# Patient Record
Sex: MALE | Race: WHITE | NOT HISPANIC OR LATINO
[De-identification: names, ages, dates, MRNs, and addresses within clinical notes are randomized per-mention and may not be internally consistent; named-entity substitution may affect disease eponyms.]

---

## 2017-01-06 ENCOUNTER — APPOINTMENT (OUTPATIENT)
Dept: GASTROENTEROLOGY | Facility: CLINIC | Age: 52
End: 2017-01-06

## 2017-01-20 ENCOUNTER — APPOINTMENT (OUTPATIENT)
Dept: GASTROENTEROLOGY | Facility: CLINIC | Age: 52
End: 2017-01-20

## 2017-01-30 ENCOUNTER — APPOINTMENT (OUTPATIENT)
Dept: INTERNAL MEDICINE | Facility: CLINIC | Age: 52
End: 2017-01-30

## 2017-02-01 ENCOUNTER — OTHER (OUTPATIENT)
Age: 52
End: 2017-02-01

## 2017-02-01 ENCOUNTER — APPOINTMENT (OUTPATIENT)
Dept: INTERNAL MEDICINE | Facility: CLINIC | Age: 52
End: 2017-02-01

## 2017-02-01 VITALS
WEIGHT: 213 LBS | TEMPERATURE: 97.9 F | DIASTOLIC BLOOD PRESSURE: 87 MMHG | HEART RATE: 83 BPM | BODY MASS INDEX: 31.55 KG/M2 | SYSTOLIC BLOOD PRESSURE: 132 MMHG | HEIGHT: 69 IN | RESPIRATION RATE: 17 BRPM

## 2017-02-01 DIAGNOSIS — F17.200 NICOTINE DEPENDENCE, UNSPECIFIED, UNCOMPLICATED: ICD-10-CM

## 2017-02-16 ENCOUNTER — APPOINTMENT (OUTPATIENT)
Dept: PODIATRY | Facility: CLINIC | Age: 52
End: 2017-02-16

## 2017-02-22 ENCOUNTER — APPOINTMENT (OUTPATIENT)
Dept: HEMATOLOGY ONCOLOGY | Facility: CLINIC | Age: 52
End: 2017-02-22

## 2017-02-22 ENCOUNTER — OUTPATIENT (OUTPATIENT)
Dept: OUTPATIENT SERVICES | Facility: HOSPITAL | Age: 52
LOS: 1 days | Discharge: HOME | End: 2017-02-22

## 2017-02-22 VITALS
TEMPERATURE: 97.6 F | WEIGHT: 216 LBS | DIASTOLIC BLOOD PRESSURE: 85 MMHG | SYSTOLIC BLOOD PRESSURE: 137 MMHG | HEIGHT: 69 IN | HEART RATE: 90 BPM | BODY MASS INDEX: 31.99 KG/M2

## 2017-02-24 LAB
AFP-TM SERPL-MCNC: 2.9 NG/ML
ALBUMIN SERPL-MCNC: 3.6 G/DL
ALBUMIN/GLOB SERPL: 0.97
ALP SERPL-CCNC: 105 IU/L
ALT SERPL-CCNC: 35 IU/L
ANION GAP SERPL CALC-SCNC: 12 MEQ/L
AST SERPL-CCNC: 44 IU/L
BASOPHILS # BLD: 0.03 TH/MM3
BASOPHILS NFR BLD: 0.6 %
BILIRUB SERPL-MCNC: 1.6 MG/DL
BUN SERPL-MCNC: 9 MG/DL
BUN/CREAT SERPL: 19.6 %
CALCIUM SERPL-MCNC: 9.2 MG/DL
CHLORIDE SERPL-SCNC: 105 MEQ/L
CO2 SERPL-SCNC: 22 MEQ/L
CREAT SERPL-MCNC: 0.46 MG/DL
EOSINOPHIL # BLD: 0.19 TH/MM3
EOSINOPHIL NFR BLD: 3.6 %
ERYTHROCYTE [DISTWIDTH] IN BLOOD BY AUTOMATED COUNT: 14.4 %
GFR SERPL CREATININE-BSD FRML MDRD: 194
GLUCOSE SERPL-MCNC: 99 MG/DL
GRANULOCYTES # BLD: 3.18 TH/MM3
GRANULOCYTES NFR BLD: 59.3 %
HCT VFR BLD AUTO: 40.4 %
HGB BLD-MCNC: 14.4 G/DL
IMM GRANULOCYTES # BLD: 0 TH/MM3
IMM GRANULOCYTES NFR BLD: 0 %
LYMPHOCYTES # BLD: 1.38 TH/MM3
LYMPHOCYTES NFR BLD: 25.8 %
MCH RBC QN AUTO: 32 PG
MCHC RBC AUTO-ENTMCNC: 35.6 G/DL
MCV RBC AUTO: 89.8 FL
MONOCYTES # BLD: 0.57 TH/MM3
MONOCYTES NFR BLD: 10.7 %
PLATELET # BLD: 80 TH/MM3
PMV BLD AUTO: 10.2 FL
POTASSIUM SERPL-SCNC: 3.8 MMOL/L
PROT SERPL-MCNC: 7.3 G/DL
RBC # BLD AUTO: 4.5 MIL/MM3
SODIUM SERPL-SCNC: 139 MEQ/L
WBC # BLD: 5.35 TH/MM3

## 2017-04-26 ENCOUNTER — APPOINTMENT (OUTPATIENT)
Dept: HEMATOLOGY ONCOLOGY | Facility: CLINIC | Age: 52
End: 2017-04-26

## 2017-05-05 ENCOUNTER — APPOINTMENT (OUTPATIENT)
Dept: PODIATRY | Facility: CLINIC | Age: 52
End: 2017-05-05

## 2017-06-27 DIAGNOSIS — D69.6 THROMBOCYTOPENIA, UNSPECIFIED: ICD-10-CM

## 2017-06-27 DIAGNOSIS — B18.2 CHRONIC VIRAL HEPATITIS C: ICD-10-CM

## 2017-06-27 DIAGNOSIS — C22.0 LIVER CELL CARCINOMA: ICD-10-CM

## 2017-08-02 ENCOUNTER — APPOINTMENT (OUTPATIENT)
Dept: INTERNAL MEDICINE | Facility: CLINIC | Age: 52
End: 2017-08-02

## 2017-08-13 ENCOUNTER — EMERGENCY (EMERGENCY)
Facility: HOSPITAL | Age: 52
LOS: 0 days | Discharge: HOME | End: 2017-08-13
Admitting: INTERNAL MEDICINE

## 2017-08-13 DIAGNOSIS — Z79.82 LONG TERM (CURRENT) USE OF ASPIRIN: ICD-10-CM

## 2017-08-13 DIAGNOSIS — E11.9 TYPE 2 DIABETES MELLITUS WITHOUT COMPLICATIONS: ICD-10-CM

## 2017-08-13 DIAGNOSIS — Z79.899 OTHER LONG TERM (CURRENT) DRUG THERAPY: ICD-10-CM
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
## 2017-08-13 DIAGNOSIS — S39.012A STRAIN OF MUSCLE, FASCIA AND TENDON OF LOWER BACK, INITIAL ENCOUNTER: ICD-10-CM

## 2017-08-13 DIAGNOSIS — Z79.84 LONG TERM (CURRENT) USE OF ORAL HYPOGLYCEMIC DRUGS: ICD-10-CM
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
## 2017-08-13 DIAGNOSIS — Z87.891 PERSONAL HISTORY OF NICOTINE DEPENDENCE: ICD-10-CM

## 2017-08-13 DIAGNOSIS — M54.5 LOW BACK PAIN: ICD-10-CM

## 2017-08-18 ENCOUNTER — APPOINTMENT (OUTPATIENT)
Dept: INTERNAL MEDICINE | Facility: CLINIC | Age: 52
End: 2017-08-18

## 2017-08-18 ENCOUNTER — OUTPATIENT (OUTPATIENT)
Dept: OUTPATIENT SERVICES | Facility: HOSPITAL | Age: 52
LOS: 1 days | Discharge: HOME | End: 2017-08-18

## 2017-08-18 VITALS
WEIGHT: 226 LBS | RESPIRATION RATE: 17 BRPM | BODY MASS INDEX: 33.47 KG/M2 | DIASTOLIC BLOOD PRESSURE: 83 MMHG | HEIGHT: 69 IN | HEART RATE: 84 BPM | SYSTOLIC BLOOD PRESSURE: 132 MMHG

## 2017-08-18 RX ORDER — TEA TREE OIL 100 %
OIL (ML) TOPICAL
Qty: 1 | Refills: 10 | Status: DISCONTINUED | COMMUNITY
Start: 2017-02-16 | End: 2017-08-18

## 2017-08-18 RX ORDER — DOCUSATE SODIUM 100 MG/1
100 CAPSULE ORAL
Qty: 60 | Refills: 5 | Status: DISCONTINUED | COMMUNITY
Start: 2017-02-01 | End: 2017-08-18

## 2017-08-18 RX ORDER — NYSTATIN 100000 [USP'U]/G
100000 CREAM TOPICAL
Qty: 3 | Refills: 10 | Status: DISCONTINUED | COMMUNITY
Start: 2017-02-16 | End: 2017-08-18

## 2017-08-18 RX ORDER — ALBUTEROL SULFATE 90 UG/1
108 (90 BASE) AEROSOL, METERED RESPIRATORY (INHALATION) 3 TIMES DAILY
Qty: 1 | Refills: 3 | Status: DISCONTINUED | COMMUNITY
Start: 2017-02-01 | End: 2017-08-18

## 2017-08-25 DIAGNOSIS — M54.9 DORSALGIA, UNSPECIFIED: ICD-10-CM

## 2017-08-25 DIAGNOSIS — E11.9 TYPE 2 DIABETES MELLITUS WITHOUT COMPLICATIONS: ICD-10-CM

## 2017-08-25 LAB
ESTIMATED AVERGAGE GLUCOSE (NORTH): 126 MG/DL
HBA1C MFR BLD: 6 %

## 2017-09-15 ENCOUNTER — APPOINTMENT (OUTPATIENT)
Dept: INTERNAL MEDICINE | Facility: CLINIC | Age: 52
End: 2017-09-15

## 2017-09-15 ENCOUNTER — OUTPATIENT (OUTPATIENT)
Dept: OUTPATIENT SERVICES | Facility: HOSPITAL | Age: 52
LOS: 1 days | Discharge: HOME | End: 2017-09-15

## 2017-09-15 VITALS
WEIGHT: 228 LBS | HEART RATE: 83 BPM | HEIGHT: 69 IN | DIASTOLIC BLOOD PRESSURE: 82 MMHG | SYSTOLIC BLOOD PRESSURE: 126 MMHG | BODY MASS INDEX: 33.77 KG/M2

## 2017-09-15 DIAGNOSIS — E11.9 TYPE 2 DIABETES MELLITUS WITHOUT COMPLICATIONS: ICD-10-CM

## 2017-09-15 DIAGNOSIS — Z23 ENCOUNTER FOR IMMUNIZATION: ICD-10-CM

## 2017-09-15 DIAGNOSIS — C22.0 LIVER CELL CARCINOMA: ICD-10-CM

## 2017-12-20 ENCOUNTER — OTHER (OUTPATIENT)
Age: 52
End: 2017-12-20

## 2018-02-22 ENCOUNTER — OUTPATIENT (OUTPATIENT)
Dept: OUTPATIENT SERVICES | Facility: HOSPITAL | Age: 53
LOS: 1 days | Discharge: HOME | End: 2018-02-22

## 2018-02-22 DIAGNOSIS — H52.4 PRESBYOPIA: ICD-10-CM

## 2018-02-22 DIAGNOSIS — H52.13 MYOPIA, BILATERAL: ICD-10-CM

## 2018-03-16 ENCOUNTER — APPOINTMENT (OUTPATIENT)
Dept: INTERNAL MEDICINE | Facility: CLINIC | Age: 53
End: 2018-03-16

## 2018-06-11 ENCOUNTER — EMERGENCY (EMERGENCY)
Facility: HOSPITAL | Age: 53
LOS: 0 days | Discharge: HOME | End: 2018-06-11
Admitting: PHYSICIAN ASSISTANT

## 2018-06-11 VITALS
SYSTOLIC BLOOD PRESSURE: 135 MMHG | OXYGEN SATURATION: 96 % | DIASTOLIC BLOOD PRESSURE: 93 MMHG | HEART RATE: 100 BPM | TEMPERATURE: 98 F | RESPIRATION RATE: 18 BRPM

## 2018-06-11 VITALS — HEART RATE: 83 BPM | SYSTOLIC BLOOD PRESSURE: 156 MMHG | DIASTOLIC BLOOD PRESSURE: 77 MMHG

## 2018-06-11 DIAGNOSIS — M25.511 PAIN IN RIGHT SHOULDER: ICD-10-CM

## 2018-06-11 NOTE — ED PROVIDER NOTE - OBJECTIVE STATEMENT
52 y.o male with hx of being deaf presents to the ED for evaluation of right shoulder pain x 3 months.  Pt was reaching back for a cup when he felt a sharp pain in his right shoulder.  Since then he has been experiencing shoulder pain with ROM and alleviated with NSAIDs and rest.  No trauma or fall to shoulder.  No prior injury to shoulder.  Right hand dominant.  Adds no other complaints at this time.  Denies edema of extremity, cold extremity, chest pain, dyspnea, edema of lower extremities, calf pain.

## 2018-06-11 NOTE — ED PROVIDER NOTE - NS ED ROS FT
CONST: No fever, chills or bodyaches  EYES: No pain, redness, drainage or visual changes.  ENT: No ear pain or discharge, nasal discharge or congestion. No sore throat  CARD: No chest pain, palpitations  RESP: No SOB, cough,  GI: No abdominal pain, N/V/D  MS: + right shoulder pain. No back pain   SKIN: No rashes  NEURO: No headache, dizziness, paresthesias

## 2018-06-11 NOTE — ED ADULT NURSE NOTE - CHIEF COMPLAINT QUOTE
pt c/o bilat shoulder pain x 3 months s/p doing heavy lifting at home. sign language audio  used, access # [id number]

## 2018-06-11 NOTE — ED PROVIDER NOTE - PHYSICAL EXAMINATION
CONST: Well appearing in NAD  EYES: Sclera and conjunctiva clear.  NECK: Non-tender, no midline C/T/L midline tenderness, no C/T/L paraspinal tenderness, no stepoff, crepitus or deformity  CARD: Normal S1 S2; Normal rate and rhythm  RESP: Equal BS B/L, No wheezes, rhonchi or rales. No distress  MS: pain with ROM of right shoulder, no point tenderness, no erythema overlying area, no evidence of trauma, no clavicular tenderness, no TTP of arm, radial pulses 2+ bilaterally  SKIN: Warm, dry, no acute rashes. Good turgor  NEURO: Strength 5/5 with no sensory deficits, NVT intact

## 2018-06-11 NOTE — ED ADULT NURSE NOTE - OBJECTIVE STATEMENT
Patient complains of shoulder pain bilateral, states he was doing heavy lifting in March, has been taking ibuprofen at home with little relief.

## 2018-06-11 NOTE — ED ADULT TRIAGE NOTE - CHIEF COMPLAINT QUOTE
pt c/o bilat shoulder pain x 3 months s/p doing heavy lifting at home. sign language audio  used, access # [id number]

## 2018-06-11 NOTE — ED ADULT TRIAGE NOTE - CADM TRG TX PRIOR TO ARRIVAL
Ok for surgery with normal CMP & CBC. FYI fibrotic changes in lungs on CXR (patient asymptomatic however).   -Yi Wilkins MD  ice/medications

## 2018-06-11 NOTE — ED PROVIDER NOTE - PROGRESS NOTE DETAILS
paged at [phone] I supervised PA fellow care  [id number] used.  Discussed with pt that sxs are consistent with rotator cuff injury.  Will take NSAIDs and discussed R.I.C.E therapy.  Will follow up with orthopedics.  All questions were answered and return precautions discussed.  pt understands and agrees with tx plan.

## 2019-11-25 ENCOUNTER — INPATIENT (INPATIENT)
Facility: HOSPITAL | Age: 54
LOS: 2 days | Discharge: HOME | End: 2019-11-28
Attending: STUDENT IN AN ORGANIZED HEALTH CARE EDUCATION/TRAINING PROGRAM | Admitting: STUDENT IN AN ORGANIZED HEALTH CARE EDUCATION/TRAINING PROGRAM
Payer: MEDICAID

## 2019-11-25 VITALS
OXYGEN SATURATION: 99 % | DIASTOLIC BLOOD PRESSURE: 75 MMHG | RESPIRATION RATE: 18 BRPM | WEIGHT: 212.97 LBS | SYSTOLIC BLOOD PRESSURE: 139 MMHG | TEMPERATURE: 101 F | HEART RATE: 105 BPM

## 2019-11-25 LAB
ALBUMIN SERPL ELPH-MCNC: 3.2 G/DL — LOW (ref 3.5–5.2)
ALP SERPL-CCNC: 137 U/L — HIGH (ref 30–115)
ALT FLD-CCNC: 45 U/L — HIGH (ref 0–41)
ANION GAP SERPL CALC-SCNC: 14 MMOL/L — SIGNIFICANT CHANGE UP (ref 7–14)
APPEARANCE UR: CLEAR — SIGNIFICANT CHANGE UP
APTT BLD: 38 SEC — SIGNIFICANT CHANGE UP (ref 27–39.2)
AST SERPL-CCNC: 51 U/L — HIGH (ref 0–41)
BACTERIA # UR AUTO: NEGATIVE — SIGNIFICANT CHANGE UP
BASOPHILS # BLD AUTO: 0.03 K/UL — SIGNIFICANT CHANGE UP (ref 0–0.2)
BASOPHILS NFR BLD AUTO: 0.5 % — SIGNIFICANT CHANGE UP (ref 0–1)
BILIRUB SERPL-MCNC: 1.7 MG/DL — HIGH (ref 0.2–1.2)
BILIRUB UR-MCNC: ABNORMAL
BUN SERPL-MCNC: 26 MG/DL — HIGH (ref 10–20)
CALCIUM SERPL-MCNC: 8.5 MG/DL — SIGNIFICANT CHANGE UP (ref 8.5–10.1)
CHLORIDE SERPL-SCNC: 94 MMOL/L — LOW (ref 98–110)
CO2 SERPL-SCNC: 21 MMOL/L — SIGNIFICANT CHANGE UP (ref 17–32)
COLOR SPEC: ABNORMAL
CREAT SERPL-MCNC: 0.8 MG/DL — SIGNIFICANT CHANGE UP (ref 0.7–1.5)
DIFF PNL FLD: ABNORMAL
EOSINOPHIL # BLD AUTO: 0 K/UL — SIGNIFICANT CHANGE UP (ref 0–0.7)
EOSINOPHIL NFR BLD AUTO: 0 % — SIGNIFICANT CHANGE UP (ref 0–8)
EPI CELLS # UR: 1 /HPF — SIGNIFICANT CHANGE UP (ref 0–5)
FLU A RESULT: NEGATIVE — SIGNIFICANT CHANGE UP
FLU A RESULT: NEGATIVE — SIGNIFICANT CHANGE UP
FLUAV AG NPH QL: NEGATIVE — SIGNIFICANT CHANGE UP
FLUBV AG NPH QL: NEGATIVE — SIGNIFICANT CHANGE UP
GLUCOSE BLDC GLUCOMTR-MCNC: 347 MG/DL — HIGH (ref 70–99)
GLUCOSE SERPL-MCNC: 235 MG/DL — HIGH (ref 70–99)
GLUCOSE UR QL: SIGNIFICANT CHANGE UP
HCT VFR BLD CALC: 42.9 % — SIGNIFICANT CHANGE UP (ref 42–52)
HGB BLD-MCNC: 15.6 G/DL — SIGNIFICANT CHANGE UP (ref 14–18)
HYALINE CASTS # UR AUTO: 1 /LPF — SIGNIFICANT CHANGE UP (ref 0–7)
IMM GRANULOCYTES NFR BLD AUTO: 0.3 % — SIGNIFICANT CHANGE UP (ref 0.1–0.3)
INR BLD: 1.4 RATIO — HIGH (ref 0.65–1.3)
KETONES UR-MCNC: ABNORMAL
LACTATE SERPL-SCNC: 1.8 MMOL/L — SIGNIFICANT CHANGE UP (ref 0.5–2.2)
LEUKOCYTE ESTERASE UR-ACNC: NEGATIVE — SIGNIFICANT CHANGE UP
LYMPHOCYTES # BLD AUTO: 1.83 K/UL — SIGNIFICANT CHANGE UP (ref 1.2–3.4)
LYMPHOCYTES # BLD AUTO: 31.6 % — SIGNIFICANT CHANGE UP (ref 20.5–51.1)
MCHC RBC-ENTMCNC: 32.8 PG — HIGH (ref 27–31)
MCHC RBC-ENTMCNC: 36.4 G/DL — SIGNIFICANT CHANGE UP (ref 32–37)
MCV RBC AUTO: 90.3 FL — SIGNIFICANT CHANGE UP (ref 80–94)
MONOCYTES # BLD AUTO: 0.59 K/UL — SIGNIFICANT CHANGE UP (ref 0.1–0.6)
MONOCYTES NFR BLD AUTO: 10.2 % — HIGH (ref 1.7–9.3)
NEUTROPHILS # BLD AUTO: 3.33 K/UL — SIGNIFICANT CHANGE UP (ref 1.4–6.5)
NEUTROPHILS NFR BLD AUTO: 57.4 % — SIGNIFICANT CHANGE UP (ref 42.2–75.2)
NITRITE UR-MCNC: NEGATIVE — SIGNIFICANT CHANGE UP
NRBC # BLD: 0 /100 WBCS — SIGNIFICANT CHANGE UP (ref 0–0)
PH UR: 6.5 — SIGNIFICANT CHANGE UP (ref 5–8)
PLATELET # BLD AUTO: 60 K/UL — LOW (ref 130–400)
POTASSIUM SERPL-MCNC: 3.8 MMOL/L — SIGNIFICANT CHANGE UP (ref 3.5–5)
POTASSIUM SERPL-SCNC: 3.8 MMOL/L — SIGNIFICANT CHANGE UP (ref 3.5–5)
PROT SERPL-MCNC: 7.3 G/DL — SIGNIFICANT CHANGE UP (ref 6–8)
PROT UR-MCNC: ABNORMAL
PROTHROM AB SERPL-ACNC: 16.1 SEC — HIGH (ref 9.95–12.87)
RBC # BLD: 4.75 M/UL — SIGNIFICANT CHANGE UP (ref 4.7–6.1)
RBC # FLD: 13.2 % — SIGNIFICANT CHANGE UP (ref 11.5–14.5)
RBC CASTS # UR COMP ASSIST: 14 /HPF — HIGH (ref 0–4)
RSV RESULT: NEGATIVE — SIGNIFICANT CHANGE UP
RSV RNA RESP QL NAA+PROBE: NEGATIVE — SIGNIFICANT CHANGE UP
SODIUM SERPL-SCNC: 129 MMOL/L — LOW (ref 135–146)
SP GR SPEC: 1.04 — HIGH (ref 1.01–1.02)
UROBILINOGEN FLD QL: ABNORMAL
WBC # BLD: 5.8 K/UL — SIGNIFICANT CHANGE UP (ref 4.8–10.8)
WBC # FLD AUTO: 5.8 K/UL — SIGNIFICANT CHANGE UP (ref 4.8–10.8)
WBC UR QL: 3 /HPF — SIGNIFICANT CHANGE UP (ref 0–5)

## 2019-11-25 PROCEDURE — 99285 EMERGENCY DEPT VISIT HI MDM: CPT

## 2019-11-25 PROCEDURE — 71046 X-RAY EXAM CHEST 2 VIEWS: CPT | Mod: 26

## 2019-11-25 PROCEDURE — 93010 ELECTROCARDIOGRAM REPORT: CPT

## 2019-11-25 RX ORDER — GLUCAGON INJECTION, SOLUTION 0.5 MG/.1ML
1 INJECTION, SOLUTION SUBCUTANEOUS ONCE
Refills: 0 | Status: DISCONTINUED | OUTPATIENT
Start: 2019-11-25 | End: 2019-11-28

## 2019-11-25 RX ORDER — INSULIN LISPRO 100/ML
VIAL (ML) SUBCUTANEOUS
Refills: 0 | Status: DISCONTINUED | OUTPATIENT
Start: 2019-11-25 | End: 2019-11-28

## 2019-11-25 RX ORDER — VANCOMYCIN HCL 1 G
2000 VIAL (EA) INTRAVENOUS ONCE
Refills: 0 | Status: COMPLETED | OUTPATIENT
Start: 2019-11-25 | End: 2019-11-25

## 2019-11-25 RX ORDER — ACETAMINOPHEN 500 MG
975 TABLET ORAL ONCE
Refills: 0 | Status: COMPLETED | OUTPATIENT
Start: 2019-11-25 | End: 2019-11-25

## 2019-11-25 RX ORDER — DEXTROSE 50 % IN WATER 50 %
25 SYRINGE (ML) INTRAVENOUS ONCE
Refills: 0 | Status: DISCONTINUED | OUTPATIENT
Start: 2019-11-25 | End: 2019-11-28

## 2019-11-25 RX ORDER — CEFTRIAXONE 500 MG/1
1000 INJECTION, POWDER, FOR SOLUTION INTRAMUSCULAR; INTRAVENOUS EVERY 24 HOURS
Refills: 0 | Status: DISCONTINUED | OUTPATIENT
Start: 2019-11-25 | End: 2019-11-27

## 2019-11-25 RX ORDER — CHLORHEXIDINE GLUCONATE 213 G/1000ML
1 SOLUTION TOPICAL
Refills: 0 | Status: DISCONTINUED | OUTPATIENT
Start: 2019-11-25 | End: 2019-11-28

## 2019-11-25 RX ORDER — KETOROLAC TROMETHAMINE 30 MG/ML
30 SYRINGE (ML) INJECTION EVERY 8 HOURS
Refills: 0 | Status: DISCONTINUED | OUTPATIENT
Start: 2019-11-25 | End: 2019-11-28

## 2019-11-25 RX ORDER — DEXTROSE 50 % IN WATER 50 %
12.5 SYRINGE (ML) INTRAVENOUS ONCE
Refills: 0 | Status: DISCONTINUED | OUTPATIENT
Start: 2019-11-25 | End: 2019-11-28

## 2019-11-25 RX ORDER — AZITHROMYCIN 500 MG/1
500 TABLET, FILM COATED ORAL DAILY
Refills: 0 | Status: DISCONTINUED | OUTPATIENT
Start: 2019-11-25 | End: 2019-11-27

## 2019-11-25 RX ORDER — SODIUM CHLORIDE 9 MG/ML
2900 INJECTION, SOLUTION INTRAVENOUS ONCE
Refills: 0 | Status: COMPLETED | OUTPATIENT
Start: 2019-11-25 | End: 2019-11-25

## 2019-11-25 RX ORDER — DEXTROSE 50 % IN WATER 50 %
15 SYRINGE (ML) INTRAVENOUS ONCE
Refills: 0 | Status: DISCONTINUED | OUTPATIENT
Start: 2019-11-25 | End: 2019-11-28

## 2019-11-25 RX ORDER — PANTOPRAZOLE SODIUM 20 MG/1
40 TABLET, DELAYED RELEASE ORAL
Refills: 0 | Status: DISCONTINUED | OUTPATIENT
Start: 2019-11-25 | End: 2019-11-28

## 2019-11-25 RX ORDER — INSULIN LISPRO 100/ML
4 VIAL (ML) SUBCUTANEOUS
Refills: 0 | Status: DISCONTINUED | OUTPATIENT
Start: 2019-11-25 | End: 2019-11-26

## 2019-11-25 RX ORDER — ACETAMINOPHEN 500 MG
650 TABLET ORAL ONCE
Refills: 0 | Status: COMPLETED | OUTPATIENT
Start: 2019-11-25 | End: 2019-11-25

## 2019-11-25 RX ORDER — INSULIN GLARGINE 100 [IU]/ML
12 INJECTION, SOLUTION SUBCUTANEOUS AT BEDTIME
Refills: 0 | Status: DISCONTINUED | OUTPATIENT
Start: 2019-11-25 | End: 2019-11-26

## 2019-11-25 RX ORDER — ACETAMINOPHEN 500 MG
650 TABLET ORAL EVERY 6 HOURS
Refills: 0 | Status: DISCONTINUED | OUTPATIENT
Start: 2019-11-25 | End: 2019-11-28

## 2019-11-25 RX ORDER — SODIUM CHLORIDE 9 MG/ML
1000 INJECTION, SOLUTION INTRAVENOUS
Refills: 0 | Status: DISCONTINUED | OUTPATIENT
Start: 2019-11-25 | End: 2019-11-28

## 2019-11-25 RX ORDER — CEFEPIME 1 G/1
2000 INJECTION, POWDER, FOR SOLUTION INTRAMUSCULAR; INTRAVENOUS ONCE
Refills: 0 | Status: COMPLETED | OUTPATIENT
Start: 2019-11-25 | End: 2019-11-25

## 2019-11-25 RX ADMIN — Medication 250 MILLIGRAM(S): at 19:44

## 2019-11-25 RX ADMIN — INSULIN GLARGINE 12 UNIT(S): 100 INJECTION, SOLUTION SUBCUTANEOUS at 21:04

## 2019-11-25 RX ADMIN — AZITHROMYCIN 500 MILLIGRAM(S): 500 TABLET, FILM COATED ORAL at 22:34

## 2019-11-25 RX ADMIN — CEFEPIME 100 MILLIGRAM(S): 1 INJECTION, POWDER, FOR SOLUTION INTRAMUSCULAR; INTRAVENOUS at 18:11

## 2019-11-25 RX ADMIN — Medication 650 MILLIGRAM(S): at 22:36

## 2019-11-25 RX ADMIN — SODIUM CHLORIDE 1450 MILLILITER(S): 9 INJECTION, SOLUTION INTRAVENOUS at 18:00

## 2019-11-25 RX ADMIN — Medication 975 MILLIGRAM(S): at 18:11

## 2019-11-25 NOTE — H&P ADULT - NSHPLABSRESULTS_GEN_ALL_CORE
Labs:                            15.6   5.80  )-----------( 60       ( 2019 16:35 )             42.9           129<L>  |  94<L>  |  26<H>  ----------------------------<  235<H>  3.8   |  21  |  0.8    Ca    8.5      2019 16:35    TPro  7.3  /  Alb  3.2<L>  /  TBili  1.7<H>  /  DBili  x   /  AST  51<H>  /  ALT  45<H>  /  AlkPhos  137<H>                Urinalysis Basic - ( 2019 15:37 )    Color: Alisson / Appearance: Clear / S.036 / pH: x  Gluc: x / Ketone: Small  / Bili: Small / Urobili: >12 mg/dL   Blood: x / Protein: >600 mg/dL / Nitrite: Negative   Leuk Esterase: Negative / RBC: 14 /HPF / WBC 3 /HPF   Sq Epi: x / Non Sq Epi: 1 /HPF / Bacteria: Negative        PT/INR - ( 2019 16:35 )   PT: 16.10 sec;   INR: 1.40 ratio         PTT - ( 2019 16:35 )  PTT:38.0 sec    Lactate Trend   @ 16:35 Lactate:1.8         < from: Xray Chest 2 Views PA/Lat (19 @ 16:06) >      Impression:      No radiographic evidence of acute cardiopulmonary disease.    < end of copied text >

## 2019-11-25 NOTE — H&P ADULT - HISTORY OF PRESENT ILLNESS
Mr. Delacruz is a 54 y.o male patient with hx of being deaf, Chronic HCV virus, complicated by HCC (underwent TARE with Yttrium-90 on 10/06/2016. 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 He was supposed to evaluated by [facility] for liver transplant),  DM (off medications), and HTN, presents to the ED for evaluation of fever and malaise.    PAtient noted that for the last 4 hours, he started to feel warm with sweats, in addition to  pounding headache, and general malaise. He also noted that he has a strange taste in his mouth, and he feels thirsty and drinking a lot of water. He denies any nasal discharge, or facial pain. He noted mild sore throat. He also noted a new non-productive cough. No SOB, No chest pain. No abdominal pain, no diarrhea, no nausea, no vomiting, no urinary complaints.     In ED, patient was febrile up to 100.5 in addition to tachycardia. He was given 2.9 L of LR. Workup was not revelaing with negative chest xray and UA. Bedside US did not show ascitis. Blood cultures obtained. He was given vancomycin + cefepime, and admitted to medical service.

## 2019-11-25 NOTE — ED PROVIDER NOTE - OBJECTIVE STATEMENT
Son's phone (speaking): [phone] 54M with pmh of liver CA, deafness, HTN and DM presents with fever for the past 2 days and malaise x3d. PT denies cough, congestion, dysuria, abd pain or distension, LE pain or swelling, CP, SOB. PT is noncompliant with medications, reportedly has not taken them for the past year.  Onc: Dewey  Son's phone (speaking): [phone]

## 2019-11-25 NOTE — ED PROVIDER NOTE - ATTENDING CONTRIBUTION TO CARE
Sign  Priscilla Ansari at bedside translating, states there is a language barrier because pt is Prydein[place] sign primarily, is learning american sign     54M PMH liver ca not on treatment (pt denies this? vs sign  having difficulty translating), ?dm per yessi reyes (pt denies), p/w fever x 2 days. states started after being on a cold bus right. chills, sweats assoc. mild dry cough and gradual onset frontal throbbing intermittent ha and facial/sinus pressure assoc. no runny nose. no sore throat, ear ache. no neck stiffness, AMS. no numbness, weakness, tingling. no blurry vision, slurred speech, trouble walking. no rash. no abd pain, nvdc. no dysuria, freq, hematuria. no recent travel, sick contacts.     on exam, AFVSS, well mila nad, ncat, eomi, perrla, mmm, nontender sinuses, neck supple no stiffness, lctab, rrr nl s1s2 no mrg, abd soft ntnd, aaox3, CN 2-12 intact, No nystagmus.  5/5 motor x 4 ext, SILT x 4 extremities, No facial droop or slurred speech. No pronator drift.  Normal rapid alternating movement and finger nose finger bilaterally. No midline C/T/L tenderness to palpation or step off. Normal gait, No ataxia.  , no le edema or calf ttp,     a/p; fever, ?viral, hx cancer/dm? will do labs, ekg, CXR, UA, cultures, flu swab, start empiric abx, re-eval. POCUS- if ascites will tap r/o sbp but pt does not appear distended.

## 2019-11-25 NOTE — H&P ADULT - ATTENDING COMMENTS
Patient seen and examined independently. Agree with resident note/ history / physical exam and plan of care with following exceptions/additions/updates. Case discussed with house-staff, nursing and patient  abd pain has improved. no diarrhea.   Vital Signs Last 24 Hrs  T(C): 37.9 (26 Nov 2019 13:33), Max: 38.1 (25 Nov 2019 14:25)  T(F): 100.3 (26 Nov 2019 13:33), Max: 100.5 (25 Nov 2019 14:25)  HR: 88 (26 Nov 2019 13:33) (87 - 105)  BP: 117/55 (26 Nov 2019 13:33) (117/55 - 139/75)  RR: 18 (26 Nov 2019 13:33) (18 - 19)  SpO2: 98% (26 Nov 2019 13:33) (98% - 99%)  Physical exam:   constitutional NAD, has hearing difficulty,  AAOX3, Respiratory  lungs CTA, CVS heart RRR, GI: abdomen Soft NT, ND, BS+, skin: intact  neuro exam non focal.                         13.0   4.62  )-----------( 53       ( 26 Nov 2019 07:18 )             36.7   11-26    129<L>  |  97<L>  |  21<H>  ----------------------------<  333<H>  3.6   |  20  |  0.6<L>    Ca    8.0<L>      26 Nov 2019 07:18    TPro  6.0  /  Alb  2.5<L>  /  TBili  1.2  /  DBili  0.6<H>  /  AST  43<H>  /  ALT  38  /  AlkPhos  138<H>  11-26    a/p  #Poss sepsis, check throat culture, bc, uc, ct abd/pelvis non contrast.   #HCC, outpt fu   #DM uncontrolled,  insulin per protocol   #hyponatremia/pseudohyponatremia, cont meds, adjust glucose.     #Progress Note Handoff  Pending (specify):  Cultures, ct   Family discussion: colleen pt , full code.   Disposition: Home_

## 2019-11-25 NOTE — ED ADULT NURSE NOTE - CHIEF COMPLAINT QUOTE
c/o tactile fever and malaise x 3 days. hx of liver cancer, poor f/u. not on medications. did not take tylenol or motrin. Ibeth Noble sign language interceptor.

## 2019-11-25 NOTE — H&P ADULT - NSICDXPASTMEDICALHX_GEN_ALL_CORE_FT
PAST MEDICAL HISTORY:  DM (diabetes mellitus)     Hepatocellular carcinoma     History of hepatitis C     Liver cancer

## 2019-11-25 NOTE — ED PROVIDER NOTE - PROGRESS NOTE DETAILS
SC: Bedside sono negative for ascites. Unlikely SBP. Unclear source of sepsis given negative ua, cxr, and unrevealing labs. Will admit for blood culture follow up and further evaluation.  Son's phone (speaking): [phone]

## 2019-11-25 NOTE — H&P ADULT - NSHPSOCIALHISTORY_GEN_ALL_CORE
Smoker, stopped smokin 3 months ago  Occasianoal alcohol use  Lives with wife who is also deaf and speaks Panamanian

## 2019-11-25 NOTE — H&P ADULT - NSHPPHYSICALEXAM_GEN_ALL_CORE
Vital Signs Last 24 Hrs  T(C): 37.4 (25 Nov 2019 20:02), Max: 38.1 (25 Nov 2019 14:25)  T(F): 99.3 (25 Nov 2019 20:02), Max: 100.5 (25 Nov 2019 14:25)  HR: 89 (25 Nov 2019 20:02) (89 - 105)  BP: 124/71 (25 Nov 2019 20:02) (124/71 - 139/75)  RR: 19 (25 Nov 2019 20:02) (18 - 19)  SpO2: 98% (25 Nov 2019 20:02) (98% - 99%)      PHYSICAL EXAM:  GENERAL: NAD, no signs of respiratory distress, on room air  HEAD:  Atraumatic, Normocephalic  EYES: EOMI, conjunctiva and sclera clear  NECK: Supple  ORAL: whitish exudates on palate  CHEST/LUNG: Clear to auscultation bilaterally; No wheeze; No crackles; No accessory muscles used  HEART: Regular rate and rhythm; No murmurs;   ABDOMEN: Soft, Nontender, Nondistended; No guarding  EXTREMITIES:  2+ Peripheral Pulses, No cyanosis or edema  PSYCH: AAOx3  NEUROLOGY: non-focal  SKIN: No rashes or lesions

## 2019-11-25 NOTE — ED PROVIDER NOTE - NS ED ROS FT
Constitutional: (+) fever (-) vomiting  Eyes/ENT: (-) eye discharge, (-) runny nose  Cardiovascular: (-) chest pain, (-) syncope  Respiratory: (-) cough, (-) shortness of breath  Gastrointestinal: (-) vomiting, (-) diarrhea, (-) abdominal pain  : (-) dysuria   Musculoskeletal: (-) neck pain, (-) back pain, (-) joint pain  Integumentary: (-) rash, (-) edema  Neurological: (-) headache, (-)loc  Allergic/Immunologic: (-) pruritus  Endocrine: No history of thyroid disease or diabetes.

## 2019-11-25 NOTE — H&P ADULT - ASSESSMENT
Mr. Delacruz is a 54 y.o male patient with hx of being deaf, Chronic HCV virus complicated by HCC (underwent TARE with Yttrium-90 on 10/06/2016. 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 He was supposed to evaluated by [facility] for liver transplant),  DM (off medications), and HTN, presents to the ED for evaluation of fever and malaise.    # Non-neutropenic fever + sepsis  - Fever + tachycardia. No leukocytosis. Normal lactate  - Unclear etiology, no pneumonia on chest xray. No urinary symptoms, negative UA. No diarrhea or abdominal pain.    - Blood cultures obtained  - FLu A/B, and RSV are negative.  - Will check urine legionella Ag.   - Given headaches and sore throat + whitish exudates on exam, possible strep pharyngitis. Will start Ceftriaxone. And given hyponatremia, and cough, will start atypical coverage with azithromycin.    # Euvolemic hyponatremia  - patient noted drinking a lot of water given strange taste in mouth.  - Will check serum/urine osmolality and urine sodium.  - Asymptomatic. Water restriction    # DM  - Last seen in [facility] in 2017, was on Metformin and pioglitazone. Currently off medications.   - will check HbA1c, and start Lantus + lispro    # HTN  - Last noted in 2017, was on ramipril ,currently off medication  - Holding off any anti-HTN given sepsis    # Chronic HCV infection and HCC   - underwent TARE with Yttrium-90 on 10/06/2016. 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  - Last note from DR. WHITLEY in February 2017, that patient will be referred for liver transplant.   - Elevated alk phos and bilirubin. Will check RUQ US.     # Thombocytopenia  - Chronic, likely due to liver cirrhosis.   - holding off DVT prophylaxis. Mr. Delacruz is a 54 y.o male patient with hx of being deaf, Chronic HCV virus complicated by HCC (underwent TARE with Yttrium-90 on 10/06/2016. 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 He was supposed to evaluated by [facility] for liver transplant),  DM (off medications), and HTN, presents to the ED for evaluation of fever and malaise.    # Non-neutropenic fever + sepsis  - Fever + tachycardia. No leukocytosis. Normal lactate  - Unclear etiology, no pneumonia on chest xray. No urinary symptoms, negative UA. No diarrhea or abdominal pain.    - Blood cultures obtained  - FLu A/B, and RSV are negative.  - Will check urine legionella Ag.   - Given headaches and sore throat + whitish exudates on exam, possible strep pharyngitis. Will start Ceftriaxone. And given hyponatremia, and cough, will start atypical coverage with azithromycin.    # Euvolemic hyponatremia  - patient noted drinking a lot of water given strange taste in mouth.  - Will check serum/urine osmolality and urine sodium.  - Asymptomatic. Water restriction    # DM  - Last seen in [facility] in 2017, was on Metformin and pioglitazone. Currently off medications.   - will check HbA1c, and start Lantus + lispro per protocol     # HTN  - Last noted in 2017, was on ramipril ,currently off medication  - Holding off any anti-HTN given sepsis    # Chronic HCV infection and HCC   - underwent TARE with Yttrium-90 on 10/06/2016. 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  - Last note from DR. WHITLEY in February 2017, that patient will be referred for liver transplant.   - Elevated alk phos and bilirubin. Will check RUQ US.     # Thombocytopenia  - Chronic, likely due to liver cirrhosis.   - holding off DVT prophylaxis.

## 2019-11-26 LAB
ALBUMIN SERPL ELPH-MCNC: 2.5 G/DL — LOW (ref 3.5–5.2)
ALP SERPL-CCNC: 138 U/L — HIGH (ref 30–115)
ALT FLD-CCNC: 38 U/L — SIGNIFICANT CHANGE UP (ref 0–41)
ANION GAP SERPL CALC-SCNC: 12 MMOL/L — SIGNIFICANT CHANGE UP (ref 7–14)
APTT BLD: 36 SEC — SIGNIFICANT CHANGE UP (ref 27–39.2)
AST SERPL-CCNC: 43 U/L — HIGH (ref 0–41)
BASOPHILS # BLD AUTO: 0.03 K/UL — SIGNIFICANT CHANGE UP (ref 0–0.2)
BASOPHILS NFR BLD AUTO: 0.6 % — SIGNIFICANT CHANGE UP (ref 0–1)
BILIRUB DIRECT SERPL-MCNC: 0.6 MG/DL — HIGH (ref 0–0.2)
BILIRUB INDIRECT FLD-MCNC: 0.6 MG/DL — SIGNIFICANT CHANGE UP (ref 0.2–1.2)
BILIRUB SERPL-MCNC: 1.2 MG/DL — SIGNIFICANT CHANGE UP (ref 0.2–1.2)
BLD GP AB SCN SERPL QL: SIGNIFICANT CHANGE UP
BUN SERPL-MCNC: 21 MG/DL — HIGH (ref 10–20)
CALCIUM SERPL-MCNC: 8 MG/DL — LOW (ref 8.5–10.1)
CHLORIDE SERPL-SCNC: 97 MMOL/L — LOW (ref 98–110)
CO2 SERPL-SCNC: 20 MMOL/L — SIGNIFICANT CHANGE UP (ref 17–32)
CREAT SERPL-MCNC: 0.6 MG/DL — LOW (ref 0.7–1.5)
EOSINOPHIL # BLD AUTO: 0 K/UL — SIGNIFICANT CHANGE UP (ref 0–0.7)
EOSINOPHIL NFR BLD AUTO: 0 % — SIGNIFICANT CHANGE UP (ref 0–8)
ERYTHROCYTE [SEDIMENTATION RATE] IN BLOOD: 43 MM/HR — HIGH (ref 0–10)
ESTIMATED AVERAGE GLUCOSE: 249 MG/DL — HIGH (ref 68–114)
GLUCOSE BLDC GLUCOMTR-MCNC: 272 MG/DL — HIGH (ref 70–99)
GLUCOSE BLDC GLUCOMTR-MCNC: 295 MG/DL — HIGH (ref 70–99)
GLUCOSE BLDC GLUCOMTR-MCNC: 296 MG/DL — HIGH (ref 70–99)
GLUCOSE BLDC GLUCOMTR-MCNC: 366 MG/DL — HIGH (ref 70–99)
GLUCOSE SERPL-MCNC: 333 MG/DL — HIGH (ref 70–99)
HBA1C BLD-MCNC: 10.3 % — HIGH (ref 4–5.6)
HCT VFR BLD CALC: 34.8 % — LOW (ref 42–52)
HCT VFR BLD CALC: 36.7 % — LOW (ref 42–52)
HGB BLD-MCNC: 12.4 G/DL — LOW (ref 14–18)
HGB BLD-MCNC: 13 G/DL — LOW (ref 14–18)
IMM GRANULOCYTES NFR BLD AUTO: 0.2 % — SIGNIFICANT CHANGE UP (ref 0.1–0.3)
INR BLD: 1.48 RATIO — HIGH (ref 0.65–1.3)
LYMPHOCYTES # BLD AUTO: 1.38 K/UL — SIGNIFICANT CHANGE UP (ref 1.2–3.4)
LYMPHOCYTES # BLD AUTO: 29.9 % — SIGNIFICANT CHANGE UP (ref 20.5–51.1)
MCHC RBC-ENTMCNC: 31.9 PG — HIGH (ref 27–31)
MCHC RBC-ENTMCNC: 32.3 PG — HIGH (ref 27–31)
MCHC RBC-ENTMCNC: 35.4 G/DL — SIGNIFICANT CHANGE UP (ref 32–37)
MCHC RBC-ENTMCNC: 35.6 G/DL — SIGNIFICANT CHANGE UP (ref 32–37)
MCV RBC AUTO: 90 FL — SIGNIFICANT CHANGE UP (ref 80–94)
MCV RBC AUTO: 90.6 FL — SIGNIFICANT CHANGE UP (ref 80–94)
MONOCYTES # BLD AUTO: 0.48 K/UL — SIGNIFICANT CHANGE UP (ref 0.1–0.6)
MONOCYTES NFR BLD AUTO: 10.4 % — HIGH (ref 1.7–9.3)
NEUTROPHILS # BLD AUTO: 2.72 K/UL — SIGNIFICANT CHANGE UP (ref 1.4–6.5)
NEUTROPHILS NFR BLD AUTO: 58.9 % — SIGNIFICANT CHANGE UP (ref 42.2–75.2)
NRBC # BLD: 0 /100 WBCS — SIGNIFICANT CHANGE UP (ref 0–0)
NRBC # BLD: 0 /100 WBCS — SIGNIFICANT CHANGE UP (ref 0–0)
OSMOLALITY SERPL: 285 MOSMOL/KG — SIGNIFICANT CHANGE UP (ref 275–300)
OSMOLALITY UR: 899 MOS/KG — SIGNIFICANT CHANGE UP (ref 50–1400)
PLATELET # BLD AUTO: 47 K/UL — LOW (ref 130–400)
PLATELET # BLD AUTO: 53 K/UL — LOW (ref 130–400)
POTASSIUM SERPL-MCNC: 3.6 MMOL/L — SIGNIFICANT CHANGE UP (ref 3.5–5)
POTASSIUM SERPL-SCNC: 3.6 MMOL/L — SIGNIFICANT CHANGE UP (ref 3.5–5)
PROT SERPL-MCNC: 6 G/DL — SIGNIFICANT CHANGE UP (ref 6–8)
PROTHROM AB SERPL-ACNC: 16.9 SEC — HIGH (ref 9.95–12.87)
RBC # BLD: 3.84 M/UL — LOW (ref 4.7–6.1)
RBC # BLD: 4.08 M/UL — LOW (ref 4.7–6.1)
RBC # FLD: 13 % — SIGNIFICANT CHANGE UP (ref 11.5–14.5)
RBC # FLD: 13.2 % — SIGNIFICANT CHANGE UP (ref 11.5–14.5)
SODIUM SERPL-SCNC: 129 MMOL/L — LOW (ref 135–146)
SODIUM UR-SCNC: 39 MMOL/L — SIGNIFICANT CHANGE UP
TSH SERPL-MCNC: 1.52 UIU/ML — SIGNIFICANT CHANGE UP (ref 0.27–4.2)
WBC # BLD: 3.53 K/UL — LOW (ref 4.8–10.8)
WBC # BLD: 4.62 K/UL — LOW (ref 4.8–10.8)
WBC # FLD AUTO: 3.53 K/UL — LOW (ref 4.8–10.8)
WBC # FLD AUTO: 4.62 K/UL — LOW (ref 4.8–10.8)

## 2019-11-26 PROCEDURE — 74176 CT ABD & PELVIS W/O CONTRAST: CPT | Mod: 26

## 2019-11-26 PROCEDURE — 76705 ECHO EXAM OF ABDOMEN: CPT | Mod: 26

## 2019-11-26 RX ORDER — INSULIN GLARGINE 100 [IU]/ML
24 INJECTION, SOLUTION SUBCUTANEOUS AT BEDTIME
Refills: 0 | Status: DISCONTINUED | OUTPATIENT
Start: 2019-11-26 | End: 2019-11-28

## 2019-11-26 RX ORDER — INSULIN LISPRO 100/ML
8 VIAL (ML) SUBCUTANEOUS
Refills: 0 | Status: DISCONTINUED | OUTPATIENT
Start: 2019-11-26 | End: 2019-11-28

## 2019-11-26 RX ORDER — SODIUM CHLORIDE 9 MG/ML
1000 INJECTION INTRAMUSCULAR; INTRAVENOUS; SUBCUTANEOUS
Refills: 0 | Status: DISCONTINUED | OUTPATIENT
Start: 2019-11-26 | End: 2019-11-27

## 2019-11-26 RX ADMIN — Medication 5: at 12:51

## 2019-11-26 RX ADMIN — SODIUM CHLORIDE 80 MILLILITER(S): 9 INJECTION INTRAMUSCULAR; INTRAVENOUS; SUBCUTANEOUS at 14:20

## 2019-11-26 RX ADMIN — Medication 4 UNIT(S): at 09:13

## 2019-11-26 RX ADMIN — Medication 8 UNIT(S): at 18:12

## 2019-11-26 RX ADMIN — Medication 4 UNIT(S): at 12:51

## 2019-11-26 RX ADMIN — CEFTRIAXONE 100 MILLIGRAM(S): 500 INJECTION, POWDER, FOR SOLUTION INTRAMUSCULAR; INTRAVENOUS at 00:29

## 2019-11-26 RX ADMIN — INSULIN GLARGINE 24 UNIT(S): 100 INJECTION, SOLUTION SUBCUTANEOUS at 22:10

## 2019-11-26 RX ADMIN — AZITHROMYCIN 500 MILLIGRAM(S): 500 TABLET, FILM COATED ORAL at 12:16

## 2019-11-26 RX ADMIN — PANTOPRAZOLE SODIUM 40 MILLIGRAM(S): 20 TABLET, DELAYED RELEASE ORAL at 09:19

## 2019-11-26 RX ADMIN — Medication 650 MILLIGRAM(S): at 13:36

## 2019-11-26 RX ADMIN — Medication 3: at 09:14

## 2019-11-26 RX ADMIN — Medication 3: at 18:12

## 2019-11-26 RX ADMIN — SODIUM CHLORIDE 80 MILLILITER(S): 9 INJECTION INTRAMUSCULAR; INTRAVENOUS; SUBCUTANEOUS at 22:13

## 2019-11-26 NOTE — PROGRESS NOTE ADULT - ASSESSMENT
Mr. Delacruz is a 54 y.o male patient with hx of being deaf, Chronic HCV virus complicated by HCC (underwent TARE with Yttrium-90 on 10/06/2016. 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 He was supposed to evaluated by [facility] for liver transplant),  DM (off medications), and HTN, presents to the ED for evaluation of fever and malaise.    # Non-neutropenic fever + sepsis  - Fever + tachycardia. No leukocytosis. Normal lactate  - Unclear etiology, no pneumonia on chest xray. No urinary symptoms, negative UA. No diarrhea or abdominal pain.    - Blood cultures obtained  - FLu A/B, and RSV are negative.  - Will check urine legionella Ag.   - Given headaches and sore throat + whitish exudates on exam, possible strep pharyngitis. Will start Ceftriaxone. And given hyponatremia, and cough, will start atypical coverage with azithromycin.    # Euvolemic hyponatremia  - patient noted drinking a lot of water given strange taste in mouth.  - Will check serum/urine osmolality and urine sodium.  - Asymptomatic. Water restriction    # DM  - Last seen in [facility] in 2017, was on Metformin and pioglitazone. Currently off medications.   - will check HbA1c, and start Lantus + lispro    # HTN  - Last noted in 2017, was on ramipril ,currently off medication  - Holding off any anti-HTN given sepsis    # Chronic HCV infection and HCC   - underwent TARE with Yttrium-90 on 10/06/2016. 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  - Last note from DR. WHITLEY in February 2017, that patient will be referred for liver transplant.   - Elevated alk phos and bilirubin. Will check RUQ US.     # Thombocytopenia  - Chronic, likely due to liver cirrhosis.   - holding off DVT prophylaxis. Mr. Delacruz is a 54 y.o male patient with hx of being deaf, Chronic HCV virus complicated by HCC (underwent TARE with Yttrium-90 on 10/06/2016. 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 He was supposed to evaluated by [facility] for liver transplant),  DM (off medications), and HTN, presents to the ED for evaluation of fever and malaise.    # Sepsis - Unknown etiology  -Tachycardia on admission with fever of 101F  - Unclear etiology, no pneumonia on chest xray.   - No urinary symptoms, Urinalysis negative for infection  but hematuria present  -CT scan of the abdomen non con ordered  -Rule out kidney stones  - Blood cultures obtained  - RSV negative.  - Patient also having hyponatremia, on and off coughing,  follow up urine legionella antigen  - On ceftriaxone and azithromycin  -ID consult    #Hyponatremia  -129 Stable  -Can consider IV fluids if sodiu    # DM  - Last seen in [facility] in 2017, was on Metformin and pioglitazone. Currently off medications.   -HbA1C >10  -Uncontrolled, Insulin lispro and lantus dose increased  -Fingerstick ACHS    # HTN  -Stable  -Off meds    # Chronic HCV infection and HCC   - underwent TARE with Yttrium-90 on 10/06/2016. 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  -  DR. WHITLEY refereed him for liver transplant to [place]  - Elevated alk phos and bilirubin.   -RUQ sono unremarkable  -Hb dropped from 15.6 to 13.0  -Will do CT scan of the abdomen noncon to rule out any bleed  AND Kidney stones    # Thrombocytopenia  - Chronic due to liver cirrhosis.   - Stable at >50    Diet -DASH  DVT - Sequential  FULL CODE Mr. Delacruz is a 54 y.o male patient with hx of being deaf, Chronic HCV virus complicated by HCC (underwent TARE with Yttrium-90 on 10/06/2016. 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 He was supposed to evaluated by [facility] for liver transplant),  DM (off medications), and HTN, presents to the ED for evaluation of fever and malaise.    # Sepsis - Unknown etiology  -Tachycardia on admission with fever of 101F  - Unclear etiology, no pneumonia on chest xray.   - No urinary symptoms, Urinalysis negative for infection  but hematuria present  -CT scan of the abdomen non con ordered  -Rule out kidney stones  - Blood cultures obtained  - RSV negative.  - Patient also having hyponatremia, on and off coughing,  follow up urine legionella antigen  - On ceftriaxone and azithromycin  -ID consult    #Hyponatremia  -129 Stable  -Started IV NS as patient is septic  -Monitor sodium levels    # DM  - Last seen in [facility] in 2017, was on Metformin and pioglitazone. Currently off medications.   -HbA1C >10  -Uncontrolled, Insulin lispro and lantus dose increased  -Fingerstick ACHS    # HTN  -Stable  -Off meds    # Chronic HCV infection and HCC   - underwent TARE with Yttrium-90 on 10/06/2016. 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  -  DR. WHITLEY refereed him for liver transplant to [place]  - Elevated alk phos and bilirubin.   -RUQ sono unremarkable  -Hb dropped from 15.6 to 13.0  -Will do CT scan of the abdomen noncon to rule out any bleed  AND Kidney stones    # Thrombocytopenia  - Chronic due to liver cirrhosis.   - Stable at >50    Diet -DASH  DVT - Sequential  FULL CODE

## 2019-11-26 NOTE — PROGRESS NOTE ADULT - SUBJECTIVE AND OBJECTIVE BOX
JUDI DA SILVA 54y Male  MRN#: [id number]   CODE STATUS:________      SUBJECTIVE  Patient is a 54y old Male who presents with a chief complaint of Fever (2019 20:52)  Currently admitted to medicine with the primary diagnosis of Sepsis  Hospital course has been complicated by _______.   Today is hospital day 1d, and this morning he is _________ and reports ________ overnight events.     Present Today:           Schmidt Catheter ()No/ ()Yes? Indication:          Central Line ()No/ ()Yes? Indication:          IV Fluids ()No/ ()Yes? Type:  Rate:  Indication:      OBJECTIVE  PAST MEDICAL & SURGICAL HISTORY  Hepatocellular carcinoma  History of hepatitis C  Liver cancer  DM (diabetes mellitus)  No significant past surgical history    ALLERGIES:  No Known Allergies    MEDICATIONS:  STANDING MEDICATIONS  azithromycin   Tablet 500 milliGRAM(s) Oral daily  cefTRIAXone   IVPB 1000 milliGRAM(s) IV Intermittent every 24 hours  chlorhexidine 4% Liquid 1 Application(s) Topical <User Schedule>  dextrose 5%. 1000 milliLiter(s) IV Continuous <Continuous>  dextrose 50% Injectable 12.5 Gram(s) IV Push once  dextrose 50% Injectable 25 Gram(s) IV Push once  dextrose 50% Injectable 25 Gram(s) IV Push once  insulin glargine Injectable (LANTUS) 12 Unit(s) SubCutaneous at bedtime  insulin lispro (HumaLOG) corrective regimen sliding scale   SubCutaneous three times a day before meals  insulin lispro Injectable (HumaLOG) 4 Unit(s) SubCutaneous three times a day before meals  pantoprazole    Tablet 40 milliGRAM(s) Oral before breakfast    PRN MEDICATIONS  acetaminophen   Tablet .. 650 milliGRAM(s) Oral every 6 hours PRN  dextrose 40% Gel 15 Gram(s) Oral once PRN  glucagon  Injectable 1 milliGRAM(s) IntraMuscular once PRN  guaiFENesin   Syrup  (Sugar-Free) 200 milliGRAM(s) Oral every 6 hours PRN  ketorolac   Injectable 30 milliGRAM(s) IV Push every 8 hours PRN      VITAL SIGNS: Last 24 Hours  T(C): 36.3 (2019 00:28), Max: 38.1 (2019 14:25)  T(F): 97.4 (2019 00:28), Max: 100.5 (2019 14:25)  HR: 87 (2019 00:28) (87 - 105)  BP: 130/64 (2019 00:28) (124/71 - 139/75)  BP(mean): --  RR: 19 (2019 00:28) (18 - 19)  SpO2: 98% (2019 00:28) (98% - 99%)    LABS:                        13.0   4.62  )-----------( 53       ( 2019 07:18 )             36.7         129<L>  |  97<L>  |  21<H>  ----------------------------<  333<H>  3.6   |  20  |  0.6<L>    Ca    8.0<L>      2019 07:18    TPro  6.0  /  Alb  2.5<L>  /  TBili  1.2  /  DBili  0.6<H>  /  AST  43<H>  /  ALT  38  /  AlkPhos  138<H>      PT/INR - ( 2019 16:35 )   PT: 16.10 sec;   INR: 1.40 ratio         PTT - ( 2019 16:35 )  PTT:38.0 sec  Urinalysis Basic - ( 2019 15:37 )    Color: Alisson / Appearance: Clear / S.036 / pH: x  Gluc: x / Ketone: Small  / Bili: Small / Urobili: >12 mg/dL   Blood: x / Protein: >600 mg/dL / Nitrite: Negative   Leuk Esterase: Negative / RBC: 14 /HPF / WBC 3 /HPF   Sq Epi: x / Non Sq Epi: 1 /HPF / Bacteria: Negative        Sedimentation Rate, Erythrocyte: 43 mm/Hr <H> (19 @ 07:18)  Lactate, Blood: 1.8 mmol/L (19 @ 16:35)      PHYSICAL EXAM:    GENERAL: NAD, well-developed, AAOx3, Severe hearing problem  HEENT:  Atraumatic, Normocephalic. EOMI, PERRLA, conjunctiva and sclera clear, No JVD  PULMONARY: Clear to auscultation bilaterally; No wheeze  CARDIOVASCULAR: Regular rate and rhythm; No murmurs, rubs, or gallops  GASTROINTESTINAL: Soft, Nontender, Nondistended; Bowel sounds present  MUSCULOSKELETAL:  2+ Peripheral Pulses, No clubbing, cyanosis, or edema  NEUROLOGY: non-focal  SKIN: No rashes or lesions JUDI DA SILVA 54y Male  MRN#: [id number]     SUBJECTIVE  Patient is a 54y old Male who presents with a chief complaint of Fever (2019 20:52)  Currently admitted to medicine with the primary diagnosis of Sepsis    OBJECTIVE  PAST MEDICAL & SURGICAL HISTORY  Hepatocellular carcinoma  History of hepatitis C  Liver cancer  DM (diabetes mellitus)  No significant past surgical history    ALLERGIES:  No Known Allergies    MEDICATIONS:  STANDING MEDICATIONS  azithromycin   Tablet 500 milliGRAM(s) Oral daily  cefTRIAXone   IVPB 1000 milliGRAM(s) IV Intermittent every 24 hours  chlorhexidine 4% Liquid 1 Application(s) Topical <User Schedule>  dextrose 5%. 1000 milliLiter(s) IV Continuous <Continuous>  dextrose 50% Injectable 12.5 Gram(s) IV Push once  dextrose 50% Injectable 25 Gram(s) IV Push once  dextrose 50% Injectable 25 Gram(s) IV Push once  insulin glargine Injectable (LANTUS) 12 Unit(s) SubCutaneous at bedtime  insulin lispro (HumaLOG) corrective regimen sliding scale   SubCutaneous three times a day before meals  insulin lispro Injectable (HumaLOG) 4 Unit(s) SubCutaneous three times a day before meals  pantoprazole    Tablet 40 milliGRAM(s) Oral before breakfast    PRN MEDICATIONS  acetaminophen   Tablet .. 650 milliGRAM(s) Oral every 6 hours PRN  dextrose 40% Gel 15 Gram(s) Oral once PRN  glucagon  Injectable 1 milliGRAM(s) IntraMuscular once PRN  guaiFENesin   Syrup  (Sugar-Free) 200 milliGRAM(s) Oral every 6 hours PRN  ketorolac   Injectable 30 milliGRAM(s) IV Push every 8 hours PRN      VITAL SIGNS: Last 24 Hours  T(C): 36.3 (2019 00:28), Max: 38.1 (2019 14:25)  T(F): 97.4 (2019 00:28), Max: 100.5 (2019 14:25)  HR: 87 (2019 00:28) (87 - 105)  BP: 130/64 (2019 00:28) (124/71 - 139/75)  BP(mean): --  RR: 19 (2019 00:28) (18 - 19)  SpO2: 98% (2019 00:28) (98% - 99%)    LABS:                        13.0   4.62  )-----------( 53       ( 2019 07:18 )             36.7         129<L>  |  97<L>  |  21<H>  ----------------------------<  333<H>  3.6   |  20  |  0.6<L>    Ca    8.0<L>      2019 07:18    TPro  6.0  /  Alb  2.5<L>  /  TBili  1.2  /  DBili  0.6<H>  /  AST  43<H>  /  ALT  38  /  AlkPhos  138<H>      PT/INR - ( 2019 16:35 )   PT: 16.10 sec;   INR: 1.40 ratio         PTT - ( 2019 16:35 )  PTT:38.0 sec  Urinalysis Basic - ( 2019 15:37 )    Color: Alisson / Appearance: Clear / S.036 / pH: x  Gluc: x / Ketone: Small  / Bili: Small / Urobili: >12 mg/dL   Blood: x / Protein: >600 mg/dL / Nitrite: Negative   Leuk Esterase: Negative / RBC: 14 /HPF / WBC 3 /HPF   Sq Epi: x / Non Sq Epi: 1 /HPF / Bacteria: Negative        Sedimentation Rate, Erythrocyte: 43 mm/Hr <H> (19 @ 07:18)  Lactate, Blood: 1.8 mmol/L (19 @ 16:35)      PHYSICAL EXAM:    GENERAL: NAD, well-developed, AAOx3, Severe hearing problem  HEENT:  Atraumatic, Normocephalic. EOMI, PERRLA, conjunctiva and sclera clear, No JVD  PULMONARY: Clear to auscultation bilaterally; No wheeze  CARDIOVASCULAR: Regular rate and rhythm; No murmurs, rubs, or gallops  GASTROINTESTINAL: Soft, tender in epigastric region, Nondistended; Bowel sounds present  MUSCULOSKELETAL:  2+ Peripheral Pulses, No clubbing, cyanosis, or edema  NEUROLOGY: non-focal  SKIN: No rashes or lesions JUDI DA SILVA 54y Male  MRN#: [id number]     SUBJECTIVE  Patient is a 54y old Male who presents with a chief complaint of Fever (2019 20:52)  Currently admitted to medicine with the primary diagnosis of Sepsis.    OBJECTIVE  PAST MEDICAL & SURGICAL HISTORY  Hepatocellular carcinoma  History of hepatitis C  Liver cancer  DM (diabetes mellitus)  No significant past surgical history    ALLERGIES:  No Known Allergies    MEDICATIONS:  STANDING MEDICATIONS  azithromycin   Tablet 500 milliGRAM(s) Oral daily  cefTRIAXone   IVPB 1000 milliGRAM(s) IV Intermittent every 24 hours  chlorhexidine 4% Liquid 1 Application(s) Topical <User Schedule>  dextrose 5%. 1000 milliLiter(s) IV Continuous <Continuous>  dextrose 50% Injectable 12.5 Gram(s) IV Push once  dextrose 50% Injectable 25 Gram(s) IV Push once  dextrose 50% Injectable 25 Gram(s) IV Push once  insulin glargine Injectable (LANTUS) 12 Unit(s) SubCutaneous at bedtime  insulin lispro (HumaLOG) corrective regimen sliding scale   SubCutaneous three times a day before meals  insulin lispro Injectable (HumaLOG) 4 Unit(s) SubCutaneous three times a day before meals  pantoprazole    Tablet 40 milliGRAM(s) Oral before breakfast    PRN MEDICATIONS  acetaminophen   Tablet .. 650 milliGRAM(s) Oral every 6 hours PRN  dextrose 40% Gel 15 Gram(s) Oral once PRN  glucagon  Injectable 1 milliGRAM(s) IntraMuscular once PRN  guaiFENesin   Syrup  (Sugar-Free) 200 milliGRAM(s) Oral every 6 hours PRN  ketorolac   Injectable 30 milliGRAM(s) IV Push every 8 hours PRN      VITAL SIGNS: Last 24 Hours  T(C): 36.3 (2019 00:28), Max: 38.1 (2019 14:25)  T(F): 97.4 (2019 00:28), Max: 100.5 (2019 14:25)  HR: 87 (2019 00:28) (87 - 105)  BP: 130/64 (2019 00:28) (124/71 - 139/75)  BP(mean): --  RR: 19 (2019 00:28) (18 - 19)  SpO2: 98% (2019 00:28) (98% - 99%)    LABS:                        13.0   4.62  )-----------( 53       ( 2019 07:18 )             36.7         129<L>  |  97<L>  |  21<H>  ----------------------------<  333<H>  3.6   |  20  |  0.6<L>    Ca    8.0<L>      2019 07:18    TPro  6.0  /  Alb  2.5<L>  /  TBili  1.2  /  DBili  0.6<H>  /  AST  43<H>  /  ALT  38  /  AlkPhos  138<H>      PT/INR - ( 2019 16:35 )   PT: 16.10 sec;   INR: 1.40 ratio         PTT - ( 2019 16:35 )  PTT:38.0 sec  Urinalysis Basic - ( 2019 15:37 )    Color: Alisson / Appearance: Clear / S.036 / pH: x  Gluc: x / Ketone: Small  / Bili: Small / Urobili: >12 mg/dL   Blood: x / Protein: >600 mg/dL / Nitrite: Negative   Leuk Esterase: Negative / RBC: 14 /HPF / WBC 3 /HPF   Sq Epi: x / Non Sq Epi: 1 /HPF / Bacteria: Negative        Sedimentation Rate, Erythrocyte: 43 mm/Hr <H> (19 @ 07:18)  Lactate, Blood: 1.8 mmol/L (19 @ 16:35)      PHYSICAL EXAM:    GENERAL: NAD, well-developed, AAOx3, Severe hearing problem  HEENT:  Atraumatic, Normocephalic. EOMI, PERRLA, conjunctiva and sclera clear, No JVD  PULMONARY: Clear to auscultation bilaterally; No wheeze  CARDIOVASCULAR: Regular rate and rhythm; No murmurs, rubs, or gallops  GASTROINTESTINAL: Soft, tender in epigastric region, Nondistended; Bowel sounds present  MUSCULOSKELETAL:  2+ Peripheral Pulses, No clubbing, cyanosis, or edema  NEUROLOGY: non-focal  SKIN: No rashes or lesions

## 2019-11-26 NOTE — PATIENT PROFILE ADULT - LANGUAGE ASSISTANCE NEEDED
Yes-Patient/Caregiver accepts free interpretation services...
Dior Richards)  Internal Medicine  [address] [place] [address]  Phone: [phone]  Fax: [phone]  Follow Up Time:

## 2019-11-26 NOTE — PATIENT PROFILE ADULT - CENTRAL VENOUS CATHETER/PICC LINE
Mom is calling back and will be on break for another 20 minutes. Please try to call before then. [[phone]] If not, please leave a detailed message in next VM per mom. Thanks.  sn no

## 2019-11-27 LAB
ALBUMIN SERPL ELPH-MCNC: 2.4 G/DL — LOW (ref 3.5–5.2)
ALP SERPL-CCNC: 113 U/L — SIGNIFICANT CHANGE UP (ref 30–115)
ALT FLD-CCNC: 35 U/L — SIGNIFICANT CHANGE UP (ref 0–41)
ANION GAP SERPL CALC-SCNC: 11 MMOL/L — SIGNIFICANT CHANGE UP (ref 7–14)
AST SERPL-CCNC: 46 U/L — HIGH (ref 0–41)
BILIRUB SERPL-MCNC: 0.8 MG/DL — SIGNIFICANT CHANGE UP (ref 0.2–1.2)
BUN SERPL-MCNC: 15 MG/DL — SIGNIFICANT CHANGE UP (ref 10–20)
CALCIUM SERPL-MCNC: 7.8 MG/DL — LOW (ref 8.5–10.1)
CHLORIDE SERPL-SCNC: 102 MMOL/L — SIGNIFICANT CHANGE UP (ref 98–110)
CO2 SERPL-SCNC: 19 MMOL/L — SIGNIFICANT CHANGE UP (ref 17–32)
CREAT SERPL-MCNC: 0.5 MG/DL — LOW (ref 0.7–1.5)
CULTURE RESULTS: NO GROWTH — SIGNIFICANT CHANGE UP
GLUCOSE BLDC GLUCOMTR-MCNC: 174 MG/DL — HIGH (ref 70–99)
GLUCOSE BLDC GLUCOMTR-MCNC: 192 MG/DL — HIGH (ref 70–99)
GLUCOSE BLDC GLUCOMTR-MCNC: 256 MG/DL — HIGH (ref 70–99)
GLUCOSE BLDC GLUCOMTR-MCNC: 269 MG/DL — HIGH (ref 70–99)
GLUCOSE SERPL-MCNC: 216 MG/DL — HIGH (ref 70–99)
HCT VFR BLD CALC: 35.5 % — LOW (ref 42–52)
HGB BLD-MCNC: 12.4 G/DL — LOW (ref 14–18)
LEGIONELLA AG UR QL: NEGATIVE — SIGNIFICANT CHANGE UP
MCHC RBC-ENTMCNC: 31.9 PG — HIGH (ref 27–31)
MCHC RBC-ENTMCNC: 34.9 G/DL — SIGNIFICANT CHANGE UP (ref 32–37)
MCV RBC AUTO: 91.3 FL — SIGNIFICANT CHANGE UP (ref 80–94)
NRBC # BLD: 0 /100 WBCS — SIGNIFICANT CHANGE UP (ref 0–0)
PLATELET # BLD AUTO: 57 K/UL — LOW (ref 130–400)
POTASSIUM SERPL-MCNC: 3.5 MMOL/L — SIGNIFICANT CHANGE UP (ref 3.5–5)
POTASSIUM SERPL-SCNC: 3.5 MMOL/L — SIGNIFICANT CHANGE UP (ref 3.5–5)
PROT SERPL-MCNC: 5.4 G/DL — LOW (ref 6–8)
RBC # BLD: 3.89 M/UL — LOW (ref 4.7–6.1)
RBC # FLD: 13.2 % — SIGNIFICANT CHANGE UP (ref 11.5–14.5)
SODIUM SERPL-SCNC: 132 MMOL/L — LOW (ref 135–146)
SPECIMEN SOURCE: SIGNIFICANT CHANGE UP
WBC # BLD: 3.69 K/UL — LOW (ref 4.8–10.8)
WBC # FLD AUTO: 3.69 K/UL — LOW (ref 4.8–10.8)

## 2019-11-27 PROCEDURE — 99232 SBSQ HOSP IP/OBS MODERATE 35: CPT

## 2019-11-27 RX ADMIN — SODIUM CHLORIDE 80 MILLILITER(S): 9 INJECTION INTRAMUSCULAR; INTRAVENOUS; SUBCUTANEOUS at 04:12

## 2019-11-27 RX ADMIN — PANTOPRAZOLE SODIUM 40 MILLIGRAM(S): 20 TABLET, DELAYED RELEASE ORAL at 05:59

## 2019-11-27 RX ADMIN — Medication 8 UNIT(S): at 11:57

## 2019-11-27 RX ADMIN — Medication 1: at 08:36

## 2019-11-27 RX ADMIN — Medication 8 UNIT(S): at 08:37

## 2019-11-27 RX ADMIN — INSULIN GLARGINE 24 UNIT(S): 100 INJECTION, SOLUTION SUBCUTANEOUS at 21:49

## 2019-11-27 RX ADMIN — Medication 1: at 17:31

## 2019-11-27 RX ADMIN — CEFTRIAXONE 100 MILLIGRAM(S): 500 INJECTION, POWDER, FOR SOLUTION INTRAMUSCULAR; INTRAVENOUS at 01:25

## 2019-11-27 RX ADMIN — Medication 8 UNIT(S): at 17:30

## 2019-11-27 RX ADMIN — AZITHROMYCIN 500 MILLIGRAM(S): 500 TABLET, FILM COATED ORAL at 11:17

## 2019-11-27 NOTE — PROGRESS NOTE ADULT - SUBJECTIVE AND OBJECTIVE BOX
JUDI DELACRUZ 54y Male  MRN#: [id number]   CODE STATUS:________      SUBJECTIVE  Patient is a 54y old Male who presents with a chief complaint of Fever (2019 08:35)  Currently admitted to medicine with the primary diagnosis of Sepsis    Patient is deaf. tried to communicate using gestures    OBJECTIVE  PAST MEDICAL & SURGICAL HISTORY  Hepatocellular carcinoma  History of hepatitis C  Liver cancer  DM (diabetes mellitus)  No significant past surgical history    ALLERGIES:  No Known Allergies    MEDICATIONS:  STANDING MEDICATIONS  azithromycin   Tablet 500 milliGRAM(s) Oral daily  cefTRIAXone   IVPB 1000 milliGRAM(s) IV Intermittent every 24 hours  chlorhexidine 4% Liquid 1 Application(s) Topical <User Schedule>  dextrose 5%. 1000 milliLiter(s) IV Continuous <Continuous>  dextrose 50% Injectable 12.5 Gram(s) IV Push once  dextrose 50% Injectable 25 Gram(s) IV Push once  dextrose 50% Injectable 25 Gram(s) IV Push once  insulin glargine Injectable (LANTUS) 24 Unit(s) SubCutaneous at bedtime  insulin lispro (HumaLOG) corrective regimen sliding scale   SubCutaneous three times a day before meals  insulin lispro Injectable (HumaLOG) 8 Unit(s) SubCutaneous three times a day before meals  pantoprazole    Tablet 40 milliGRAM(s) Oral before breakfast    PRN MEDICATIONS  acetaminophen   Tablet .. 650 milliGRAM(s) Oral every 6 hours PRN  dextrose 40% Gel 15 Gram(s) Oral once PRN  glucagon  Injectable 1 milliGRAM(s) IntraMuscular once PRN  guaiFENesin   Syrup  (Sugar-Free) 200 milliGRAM(s) Oral every 6 hours PRN  ketorolac   Injectable 30 milliGRAM(s) IV Push every 8 hours PRN      VITAL SIGNS: Last 24 Hours  T(C): 36.7 (2019 07:30), Max: 37.9 (2019 13:33)  T(F): 98.1 (2019 07:30), Max: 100.3 (2019 13:33)  HR: 79 (2019 07:30) (78 - 88)  BP: 120/60 (2019 07:30) (103/53 - 120/60)  BP(mean): --  RR: 18 (2019 07:30) (18 - 19)  SpO2: 99% (2019 16:47) (98% - 99%)    LABS:                        12.4   3.69  )-----------( 57       ( 2019 05:00 )             35.5     11    132<L>  |  102  |  15  ----------------------------<  216<H>  3.5   |  19  |  0.5<L>    Ca    7.8<L>      2019 05:00    TPro  5.4<L>  /  Alb  2.4<L>  /  TBili  0.8  /  DBili  x   /  AST  46<H>  /  ALT  35  /  AlkPhos  113      PT/INR - ( 2019 16:50 )   PT: 16.90 sec;   INR: 1.48 ratio         PTT - ( 2019 16:50 )  PTT:36.0 sec  Urinalysis Basic - ( 2019 15:37 )    Color: Alisson / Appearance: Clear / S.036 / pH: x  Gluc: x / Ketone: Small  / Bili: Small / Urobili: >12 mg/dL   Blood: x / Protein: >600 mg/dL / Nitrite: Negative   Leuk Esterase: Negative / RBC: 14 /HPF / WBC 3 /HPF   Sq Epi: x / Non Sq Epi: 1 /HPF / Bacteria: Negative            Culture - Blood (collected 2019 16:35)  Source: .Blood Blood-Peripheral  Preliminary Report (2019 23:01):    No growth to date.    Culture - Blood (collected 2019 16:35)  Source: .Blood Blood-Peripheral  Preliminary Report (2019 23:01):    No growth to date.    Culture - Urine (collected 2019 15:37)  Source: .Urine Clean Catch (Midstream)  Final Report (2019 01:50):    No growth          RADIOLOGY:        PHYSICAL EXAM:    GENERAL: NAD, well-developed, AAOx3, Severe hearing problem  HEENT:  Atraumatic, Normocephalic. EOMI, PERRLA, conjunctiva and sclera clear, No JVD  PULMONARY: Clear to auscultation bilaterally; No wheeze  CARDIOVASCULAR: Regular rate and rhythm; No murmurs, rubs, or gallops  GASTROINTESTINAL: Soft, tender in epigastric region, Nondistended; Bowel sounds present  MUSCULOSKELETAL:  2+ Peripheral Pulses, No clubbing, cyanosis, or edema  NEUROLOGY: non-focal  SKIN: No rashes or lesions              Assessment and Plan:   · Assessment		  Mr. Delacruz is a 54 y.o male patient with hx of being deaf, Chronic HCV virus complicated by HCC (underwent TARE with Yttrium-90 on 10/06/2016. 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 He was supposed to evaluated by [facility] for liver transplant),  DM (off medications), and HTN, presents to the ED for evaluation of fever and malaise.    # Sepsis - Unknown etiology  -Tachycardia on admission with fever of 101F  - Unclear etiology, no pneumonia on chest xray.   - No urinary symptoms, Urinalysis negative for infection  but hematuria present  -CT scan of the abdomen non con ordered  -Rule out kidney stones  - Blood cultures obtained  - RSV negative.  - Patient also having hyponatremia, on and off coughing,  follow up urine legionella antigen  - On ceftriaxone and azithromycin  -ID consult    #Hyponatremia  -129 Stable  -Started IV NS as patient is septic  -Monitor sodium levels    # DM  - Last seen in [facility] in , was on Metformin and pioglitazone. Currently off medications.   -HbA1C >10  -Uncontrolled, Insulin lispro and lantus dose increased  -Fingerstick ACHS    # HTN  -Stable  -Off meds    # Chronic HCV infection and HCC   - underwent TARE with Yttrium-90 on 10/06/2016. 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  -  DR. WHITLEY refereed him for liver transplant to [facility]  - Elevated alk phos and bilirubin.   -RUQ sono unremarkable  -Hb dropped from 15.6 to 13.0  -Will do CT scan of the abdomen noncon to rule out any bleed  AND Kidney stones    # Thrombocytopenia  - Chronic due to liver cirrhosis.   - Stable at >50    Diet -DASH  DVT - Sequential  FULL CODE JUDI DELACRUZ 54y Male  MRN#: [id number]         SUBJECTIVE  Patient is a 54y old Male who presents with a chief complaint of Fever (2019 08:35)  Currently admitted to medicine with the primary diagnosis of Sepsis    Patient is deaf. History was taken in the presence of Doris the . He reports improvement in his symptoms. no cough. had some loose stools but no diarrhea. reports some occipital headache    OBJECTIVE  PAST MEDICAL & SURGICAL HISTORY  Hepatocellular carcinoma  History of hepatitis C  Liver cancer  DM (diabetes mellitus)  No significant past surgical history    ALLERGIES:  No Known Allergies    MEDICATIONS:  STANDING MEDICATIONS  azithromycin   Tablet 500 milliGRAM(s) Oral daily  cefTRIAXone   IVPB 1000 milliGRAM(s) IV Intermittent every 24 hours  chlorhexidine 4% Liquid 1 Application(s) Topical <User Schedule>  dextrose 5%. 1000 milliLiter(s) IV Continuous <Continuous>  dextrose 50% Injectable 12.5 Gram(s) IV Push once  dextrose 50% Injectable 25 Gram(s) IV Push once  dextrose 50% Injectable 25 Gram(s) IV Push once  insulin glargine Injectable (LANTUS) 24 Unit(s) SubCutaneous at bedtime  insulin lispro (HumaLOG) corrective regimen sliding scale   SubCutaneous three times a day before meals  insulin lispro Injectable (HumaLOG) 8 Unit(s) SubCutaneous three times a day before meals  pantoprazole    Tablet 40 milliGRAM(s) Oral before breakfast    PRN MEDICATIONS  acetaminophen   Tablet .. 650 milliGRAM(s) Oral every 6 hours PRN  dextrose 40% Gel 15 Gram(s) Oral once PRN  glucagon  Injectable 1 milliGRAM(s) IntraMuscular once PRN  guaiFENesin   Syrup  (Sugar-Free) 200 milliGRAM(s) Oral every 6 hours PRN  ketorolac   Injectable 30 milliGRAM(s) IV Push every 8 hours PRN      VITAL SIGNS: Last 24 Hours  T(C): 36.7 (2019 07:30), Max: 37.9 (2019 13:33)  T(F): 98.1 (2019 07:30), Max: 100.3 (2019 13:33)  HR: 79 (2019 07:30) (78 - 88)  BP: 120/60 (2019 07:30) (103/53 - 120/60)  BP(mean): --  RR: 18 (2019 07:30) (18 - 19)  SpO2: 99% (2019 16:47) (98% - 99%)    LABS:                        12.4   3.69  )-----------( 57       ( 2019 05:00 )             35.5     11    132<L>  |  102  |  15  ----------------------------<  216<H>  3.5   |  19  |  0.5<L>    Ca    7.8<L>      2019 05:00    TPro  5.4<L>  /  Alb  2.4<L>  /  TBili  0.8  /  DBili  x   /  AST  46<H>  /  ALT  35  /  AlkPhos  113      PT/INR - ( 2019 16:50 )   PT: 16.90 sec;   INR: 1.48 ratio         PTT - ( 2019 16:50 )  PTT:36.0 sec  Urinalysis Basic - ( 2019 15:37 )    Color: Alisson / Appearance: Clear / S.036 / pH: x  Gluc: x / Ketone: Small  / Bili: Small / Urobili: >12 mg/dL   Blood: x / Protein: >600 mg/dL / Nitrite: Negative   Leuk Esterase: Negative / RBC: 14 /HPF / WBC 3 /HPF   Sq Epi: x / Non Sq Epi: 1 /HPF / Bacteria: Negative            Culture - Blood (collected 2019 16:35)  Source: .Blood Blood-Peripheral  Preliminary Report (2019 23:01):    No growth to date.    Culture - Blood (collected 2019 16:35)  Source: .Blood Blood-Peripheral  Preliminary Report (2019 23:01):    No growth to date.    Culture - Urine (collected 2019 15:37)  Source: .Urine Clean Catch (Midstream)  Final Report (2019 01:50):    No growth          RADIOLOGY:        PHYSICAL EXAM:    GENERAL: NAD, well-developed, AAOx3, Severe hearing problem  HEENT:  Atraumatic, Normocephalic. EOMI, PERRLA, conjunctiva and sclera clear, No JVD  PULMONARY: Clear to auscultation bilaterally; No wheeze  CARDIOVASCULAR: Regular rate and rhythm; No murmurs, rubs, or gallops  GASTROINTESTINAL: Soft, tender in epigastric region, Nondistended; Bowel sounds present  MUSCULOSKELETAL:  2+ Peripheral Pulses, No clubbing, cyanosis, or edema  NEUROLOGY: non-focal  SKIN: No rashes or lesions              Assessment and Plan:   Mr. Delacruz is a 54 y.o male patient with hx of being deaf, Chronic HCV virus complicated by HCC (underwent TARE with Yttrium-90 on 10/06/2016. 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 He was supposed to evaluated by [facility] for liver transplant),  DM (off medications), and HTN, presents to the ED for evaluation of fever and malaise.    # Sepsis - Unknown etiology  -Tachycardia on admission with fever of 101F. afebrile now  - Unclear etiology, no pneumonia on chest xray.   - No urinary symptoms, Urinalysis negative for infection  -CT scan of the abdomen --> negative  - Blood cultures --> negative so far  - RSV negative.  - follow up urine legionella  - On ceftriaxone and azithromycin D3 abx  -ID consult  - leukopenia    #Hyponatremia  -129 --> 132  - no need for fluids  -Monitor sodium levels    # DM  - Last seen in [facility] in , was on Metformin and pioglitazone. Currently off medications.   -HbA1C >10  -Uncontrolled, Insulin lispro and lantus dose increased  -Fingerstick ACHS    # HTN  -Stable  -Off meds    # Chronic HCV infection and HCC   - underwent TARE with Yttrium-90 on 10/06/2016. 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  -  DR. WHITLEY refereed him for liver transplant to [place]  - Elevated alk phos and bilirubin.   -RUQ sono unremarkable  -Hb dropped from 15.6 to 13.0  -Will do CT scan of the abdomen noncon to rule out any bleed --> negative      # Thrombocytopenia  - Chronic due to liver cirrhosis.   - Stable at >50    Diet -DASH  DVT - Sequential  FULL CODE JUDI DELACRUZ 54y Male  MRN#: [id number]     SUBJECTIVE  Patient is a 54y old Male who presents with a chief complaint of Fever (2019 08:35)  Currently admitted to medicine with the primary diagnosis of Sepsis    Patient is deaf. History was taken in the presence of Doris the . He reports improvement in his symptoms. no cough. had some loose stools but no diarrhea. reports some occipital headache. ROS otherwise negative    OBJECTIVE  PAST MEDICAL & SURGICAL HISTORY  Hepatocellular carcinoma  History of hepatitis C  Liver cancer  DM (diabetes mellitus)  No significant past surgical history    ALLERGIES:  No Known Allergies    MEDICATIONS:  STANDING MEDICATIONS  azithromycin   Tablet 500 milliGRAM(s) Oral daily  cefTRIAXone   IVPB 1000 milliGRAM(s) IV Intermittent every 24 hours  chlorhexidine 4% Liquid 1 Application(s) Topical <User Schedule>  dextrose 5%. 1000 milliLiter(s) IV Continuous <Continuous>  dextrose 50% Injectable 12.5 Gram(s) IV Push once  dextrose 50% Injectable 25 Gram(s) IV Push once  dextrose 50% Injectable 25 Gram(s) IV Push once  insulin glargine Injectable (LANTUS) 24 Unit(s) SubCutaneous at bedtime  insulin lispro (HumaLOG) corrective regimen sliding scale   SubCutaneous three times a day before meals  insulin lispro Injectable (HumaLOG) 8 Unit(s) SubCutaneous three times a day before meals  pantoprazole    Tablet 40 milliGRAM(s) Oral before breakfast    PRN MEDICATIONS  acetaminophen   Tablet .. 650 milliGRAM(s) Oral every 6 hours PRN  dextrose 40% Gel 15 Gram(s) Oral once PRN  glucagon  Injectable 1 milliGRAM(s) IntraMuscular once PRN  guaiFENesin   Syrup  (Sugar-Free) 200 milliGRAM(s) Oral every 6 hours PRN  ketorolac   Injectable 30 milliGRAM(s) IV Push every 8 hours PRN      VITAL SIGNS: Last 24 Hours  T(C): 36.7 (2019 07:30), Max: 37.9 (2019 13:33)  T(F): 98.1 (2019 07:30), Max: 100.3 (2019 13:33)  HR: 79 (2019 07:30) (78 - 88)  BP: 120/60 (2019 07:30) (103/53 - 120/60)  BP(mean): --  RR: 18 (2019 07:30) (18 - 19)  SpO2: 99% (2019 16:47) (98% - 99%)    LABS:                        12.4   3.69  )-----------( 57       ( 2019 05:00 )             35.5     11    132<L>  |  102  |  15  ----------------------------<  216<H>  3.5   |  19  |  0.5<L>    Ca    7.8<L>      2019 05:00    TPro  5.4<L>  /  Alb  2.4<L>  /  TBili  0.8  /  DBili  x   /  AST  46<H>  /  ALT  35  /  AlkPhos  113      PT/INR - ( 2019 16:50 )   PT: 16.90 sec;   INR: 1.48 ratio         PTT - ( 2019 16:50 )  PTT:36.0 sec  Urinalysis Basic - ( 2019 15:37 )    Color: Alisson / Appearance: Clear / S.036 / pH: x  Gluc: x / Ketone: Small  / Bili: Small / Urobili: >12 mg/dL   Blood: x / Protein: >600 mg/dL / Nitrite: Negative   Leuk Esterase: Negative / RBC: 14 /HPF / WBC 3 /HPF   Sq Epi: x / Non Sq Epi: 1 /HPF / Bacteria: Negative            Culture - Blood (collected 2019 16:35)  Source: .Blood Blood-Peripheral  Preliminary Report (2019 23:01):    No growth to date.    Culture - Blood (collected 2019 16:35)  Source: .Blood Blood-Peripheral  Preliminary Report (2019 23:01):    No growth to date.    Culture - Urine (collected 2019 15:37)  Source: .Urine Clean Catch (Midstream)  Final Report (2019 01:50):    No growth          RADIOLOGY:        PHYSICAL EXAM:    GENERAL: NAD, well-developed, AAOx3, Severe hearing problem  HEENT:  Atraumatic, Normocephalic. EOMI, PERRLA, conjunctiva and sclera clear, No JVD  PULMONARY: Clear to auscultation bilaterally; No wheeze  CARDIOVASCULAR: Regular rate and rhythm; No murmurs, rubs, or gallops  GASTROINTESTINAL: Soft, tender in epigastric region, Nondistended; Bowel sounds present  MUSCULOSKELETAL:  2+ Peripheral Pulses, No clubbing, cyanosis, or edema  NEUROLOGY: non-focal  SKIN: No rashes or lesions        Assessment and Plan:   Mr. Delacruz is a 54 y.o male patient with hx of being deaf, Chronic HCV virus complicated by HCC (underwent TARE with Yttrium-90 on 10/06/2016. 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 He was supposed to evaluated by [facility] for liver transplant),  DM (off medications), and HTN, presents to the ED for evaluation of fever and malaise.    # Sepsis - Unknown etiology  -Tachycardia on admission with fever of 101F. afebrile now  - Unclear etiology, no pneumonia on chest xray.   - No urinary symptoms, Urinalysis negative for infection  -CT scan of the abdomen --> negative  - Blood cultures --> negative so far  - RSV negative.  - follow up urine legionella  - On ceftriaxone and azithromycin D3 abx  -ID consult  - leukopenia    #Hyponatremia  -129 --> 132  - no need for fluids  -Monitor sodium levels    # DM  - Last seen in [facility] in , was on Metformin and pioglitazone. Currently off medications.   -HbA1C >10  -Uncontrolled, Insulin lispro and lantus dose increased  -Fingerstick ACHS    # HTN  -Stable  -Off meds    # Chronic HCV infection and HCC   - underwent TARE with Yttrium-90 on 10/06/2016. 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  -  DR. WHITLEY refereed him for liver transplant to [place]  - Elevated alk phos and bilirubin.   -RUQ sono unremarkable  -Hb dropped from 15.6 to 13.0  -Will do CT scan of the abdomen noncon to rule out any bleed --> negative      # Thrombocytopenia  - Chronic due to liver cirrhosis.   - Stable at >50    Diet -DASH  DVT - Sequential  FULL CODE

## 2019-11-27 NOTE — CONSULT NOTE ADULT - SUBJECTIVE AND OBJECTIVE BOX
JUDI DELACRUZ  54y, Male  Allergy: No Known Allergies      All historical available data reviewed.    HPI:  Mr. Delacruz is a 54 y.o male patient with hx of being deaf, Chronic HCV virus, complicated by HCC (underwent TARE with Yttrium-90 on 10/06/2016. 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 He was supposed to evaluated by [facility] for liver transplant),  DM (off medications), and HTN, presents to the ED for evaluation of fever and malaise.    PAtient noted that for the last 4 hours, he started to feel warm with sweats, in addition to  pounding headache, and general malaise. He also noted that he has a strange taste in his mouth, and he feels thirsty and drinking a lot of water. He denies any nasal discharge, or facial pain. He noted mild sore throat. He also noted a new non-productive cough. No SOB, No chest pain. No abdominal pain, no diarrhea, no nausea, no vomiting, no urinary complaints.     In ED, patient was febrile up to 100.5 in addition to tachycardia. He was given 2.9 L of LR. Workup was not revelaing with negative chest xray and UA. Bedside US did not show ascitis. Blood cultures obtained. He was given vancomycin + cefepime, and admitted to medical service. (2019 20:52)    FAMILY HISTORY:    PAST MEDICAL & SURGICAL HISTORY:  Hepatocellular carcinoma  History of hepatitis C  Liver cancer  DM (diabetes mellitus)  No significant past surgical history        VITALS:  T(F): 98.1, Max: 100.3 (19 @ 13:33)  HR: 79  BP: 120/60  RR: 18Vital Signs Last 24 Hrs  T(C): 36.7 (2019 07:30), Max: 37.9 (2019 13:33)  T(F): 98.1 (2019 07:30), Max: 100.3 (2019 13:33)  HR: 79 (2019 07:30) (78 - 88)  BP: 120/60 (2019 07:30) (103/53 - 120/60)  BP(mean): --  RR: 18 (2019 07:30) (18 - 19)  SpO2: 99% (2019 16:47) (98% - 99%)    TESTS & MEASUREMENTS:                        12.4   3.69  )-----------( 57       ( 2019 05:00 )             35.5         132<L>  |  102  |  15  ----------------------------<  216<H>  3.5   |  19  |  0.5<L>    Ca    7.8<L>      2019 05:00    TPro  5.4<L>  /  Alb  2.4<L>  /  TBili  0.8  /  DBili  x   /  AST  46<H>  /  ALT  35  /  AlkPhos  113      LIVER FUNCTIONS - ( 2019 05:00 )  Alb: 2.4 g/dL / Pro: 5.4 g/dL / ALK PHOS: 113 U/L / ALT: 35 U/L / AST: 46 U/L / GGT: x             Culture - Blood (collected 19 @ 16:35)  Source: .Blood Blood-Peripheral  Preliminary Report (19 @ 23:01):    No growth to date.    Culture - Blood (collected 19 @ 16:35)  Source: .Blood Blood-Peripheral  Preliminary Report (19 @ 23:01):    No growth to date.    Culture - Urine (collected 19 @ 15:37)  Source: .Urine Clean Catch (Midstream)  Final Report (19 @ 01:50):    No growth      Urinalysis Basic - ( 2019 15:37 )    Color: Alisson / Appearance: Clear / S.036 / pH: x  Gluc: x / Ketone: Small  / Bili: Small / Urobili: >12 mg/dL   Blood: x / Protein: >600 mg/dL / Nitrite: Negative   Leuk Esterase: Negative / RBC: 14 /HPF / WBC 3 /HPF   Sq Epi: x / Non Sq Epi: 1 /HPF / Bacteria: Negative          RADIOLOGY & ADDITIONAL TESTS:  Personal review of radiological diagnostics performed  Echo and EKG results noted when applicable.     ANTIBIOTICS:  azithromycin   Tablet 500 milliGRAM(s) Oral daily  cefTRIAXone   IVPB 1000 milliGRAM(s) IV Intermittent every 24 hours

## 2019-11-27 NOTE — PROGRESS NOTE ADULT - ATTENDING COMMENTS
Pt seen and examined independently at bedside. Communication done via writing. Pt states he feels well.  Sodium 134 when corrected with glucose. ID consult appreciated. Will d/c antibiotics and monitor overnight off antibiotics. Pt likely had viral syndrome. Sepsis resolved. If pt remains stable in am will d/c home.    #Progress Note Handoff:  Pending (specify):  overnight observation  Family discussion: discussed monitoring with pt at bedside  Disposition: Home__x_/SNF___/Other________/Unknown at this time________

## 2019-11-28 ENCOUNTER — TRANSCRIPTION ENCOUNTER (OUTPATIENT)
Age: 54
End: 2019-11-28

## 2019-11-28 VITALS
DIASTOLIC BLOOD PRESSURE: 63 MMHG | HEART RATE: 75 BPM | SYSTOLIC BLOOD PRESSURE: 124 MMHG | TEMPERATURE: 97 F | RESPIRATION RATE: 19 BRPM

## 2019-11-28 LAB
ALBUMIN SERPL ELPH-MCNC: 2.5 G/DL — LOW (ref 3.5–5.2)
ALP SERPL-CCNC: 113 U/L — SIGNIFICANT CHANGE UP (ref 30–115)
ALT FLD-CCNC: 51 U/L — HIGH (ref 0–41)
ANION GAP SERPL CALC-SCNC: 8 MMOL/L — SIGNIFICANT CHANGE UP (ref 7–14)
AST SERPL-CCNC: 68 U/L — HIGH (ref 0–41)
BASOPHILS # BLD AUTO: 0.03 K/UL — SIGNIFICANT CHANGE UP (ref 0–0.2)
BASOPHILS NFR BLD AUTO: 0.9 % — SIGNIFICANT CHANGE UP (ref 0–1)
BILIRUB SERPL-MCNC: 1 MG/DL — SIGNIFICANT CHANGE UP (ref 0.2–1.2)
BUN SERPL-MCNC: 12 MG/DL — SIGNIFICANT CHANGE UP (ref 10–20)
CALCIUM SERPL-MCNC: 8.2 MG/DL — LOW (ref 8.5–10.1)
CHLORIDE SERPL-SCNC: 109 MMOL/L — SIGNIFICANT CHANGE UP (ref 98–110)
CO2 SERPL-SCNC: 21 MMOL/L — SIGNIFICANT CHANGE UP (ref 17–32)
CREAT SERPL-MCNC: 0.5 MG/DL — LOW (ref 0.7–1.5)
EOSINOPHIL # BLD AUTO: 0 K/UL — SIGNIFICANT CHANGE UP (ref 0–0.7)
EOSINOPHIL NFR BLD AUTO: 0 % — SIGNIFICANT CHANGE UP (ref 0–8)
GLUCOSE BLDC GLUCOMTR-MCNC: 160 MG/DL — HIGH (ref 70–99)
GLUCOSE SERPL-MCNC: 188 MG/DL — HIGH (ref 70–99)
HCT VFR BLD CALC: 36.4 % — LOW (ref 42–52)
HGB BLD-MCNC: 12.9 G/DL — LOW (ref 14–18)
IMM GRANULOCYTES NFR BLD AUTO: 0.3 % — SIGNIFICANT CHANGE UP (ref 0.1–0.3)
LYMPHOCYTES # BLD AUTO: 1.46 K/UL — SIGNIFICANT CHANGE UP (ref 1.2–3.4)
LYMPHOCYTES # BLD AUTO: 41.7 % — SIGNIFICANT CHANGE UP (ref 20.5–51.1)
MAGNESIUM SERPL-MCNC: 1.4 MG/DL — LOW (ref 1.8–2.4)
MCHC RBC-ENTMCNC: 32.7 PG — HIGH (ref 27–31)
MCHC RBC-ENTMCNC: 35.4 G/DL — SIGNIFICANT CHANGE UP (ref 32–37)
MCV RBC AUTO: 92.2 FL — SIGNIFICANT CHANGE UP (ref 80–94)
MONOCYTES # BLD AUTO: 0.39 K/UL — SIGNIFICANT CHANGE UP (ref 0.1–0.6)
MONOCYTES NFR BLD AUTO: 11.1 % — HIGH (ref 1.7–9.3)
NEUTROPHILS # BLD AUTO: 1.61 K/UL — SIGNIFICANT CHANGE UP (ref 1.4–6.5)
NEUTROPHILS NFR BLD AUTO: 46 % — SIGNIFICANT CHANGE UP (ref 42.2–75.2)
NRBC # BLD: 0 /100 WBCS — SIGNIFICANT CHANGE UP (ref 0–0)
PLATELET # BLD AUTO: 63 K/UL — LOW (ref 130–400)
POTASSIUM SERPL-MCNC: 3.6 MMOL/L — SIGNIFICANT CHANGE UP (ref 3.5–5)
POTASSIUM SERPL-SCNC: 3.6 MMOL/L — SIGNIFICANT CHANGE UP (ref 3.5–5)
PROT SERPL-MCNC: 5.7 G/DL — LOW (ref 6–8)
RBC # BLD: 3.95 M/UL — LOW (ref 4.7–6.1)
RBC # FLD: 13.2 % — SIGNIFICANT CHANGE UP (ref 11.5–14.5)
SODIUM SERPL-SCNC: 138 MMOL/L — SIGNIFICANT CHANGE UP (ref 135–146)
WBC # BLD: 3.5 K/UL — LOW (ref 4.8–10.8)
WBC # FLD AUTO: 3.5 K/UL — LOW (ref 4.8–10.8)

## 2019-11-28 PROCEDURE — 99239 HOSP IP/OBS DSCHRG MGMT >30: CPT

## 2019-11-28 RX ORDER — PIOGLITAZONE HYDROCHLORIDE 15 MG/1
1 TABLET ORAL
Qty: 30 | Refills: 0
Start: 2019-11-28 | End: 2019-12-27

## 2019-11-28 RX ORDER — ISOPROPYL ALCOHOL, BENZOCAINE .7; .06 ML/ML; ML/ML
1 SWAB TOPICAL
Qty: 100 | Refills: 1
Start: 2019-11-28 | End: 2020-01-16

## 2019-11-28 RX ORDER — SITAGLIPTIN 50 MG/1
1 TABLET, FILM COATED ORAL
Qty: 30 | Refills: 0
Start: 2019-11-28 | End: 2019-12-27

## 2019-11-28 RX ORDER — MAGNESIUM SULFATE 500 MG/ML
2 VIAL (ML) INJECTION ONCE
Refills: 0 | Status: COMPLETED | OUTPATIENT
Start: 2019-11-28 | End: 2019-11-28

## 2019-11-28 RX ORDER — PIOGLITAZONE HYDROCHLORIDE 15 MG/1
30 TABLET ORAL ONCE
Refills: 0 | Status: COMPLETED | OUTPATIENT
Start: 2019-11-28 | End: 2019-11-28

## 2019-11-28 RX ADMIN — PANTOPRAZOLE SODIUM 40 MILLIGRAM(S): 20 TABLET, DELAYED RELEASE ORAL at 09:35

## 2019-11-28 RX ADMIN — PIOGLITAZONE HYDROCHLORIDE 30 MILLIGRAM(S): 15 TABLET ORAL at 11:40

## 2019-11-28 RX ADMIN — Medication 1: at 08:34

## 2019-11-28 RX ADMIN — Medication 8 UNIT(S): at 09:35

## 2019-11-28 RX ADMIN — Medication 50 GRAM(S): at 11:11

## 2019-11-28 RX ADMIN — CHLORHEXIDINE GLUCONATE 1 APPLICATION(S): 213 SOLUTION TOPICAL at 05:10

## 2019-11-28 NOTE — DISCHARGE NOTE PROVIDER - NSDCMRMEDTOKEN_GEN_ALL_CORE_FT
Naprosyn 500 mg oral tablet: 1 tab(s) orally 2 times a day alcohol swabs : Apply topically to affected area 4 times a day   glucometer (per patient&#x27;s insurance): Test blood sugars four times a day. Dispense #1 glucometer.  Januvia 100 mg oral tablet: 1 tab(s) orally once a day   lancets: 1 application subcutaneously 4 times a day   pioglitazone 30 mg oral tablet: 1 tab(s) orally once  test strips (per patient&#x27;s insurance): 1 application subcutaneously 4 times a day. ** Compatible with patient&#x27;s glucometer **

## 2019-11-28 NOTE — DISCHARGE NOTE PROVIDER - CARE PROVIDER_API CALL
Louann Huber)  Internal Medicine  [address] [place] [address]  Phone: [phone]  Fax: [phone]  Follow Up Time: 1 week

## 2019-11-28 NOTE — PROGRESS NOTE ADULT - ASSESSMENT
54 y.o male patient with hx of being deaf, Chronic HCV virus complicated by HCC (underwent TARE with Yttrium-90 on 10/06/2016. 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 He was supposed to evaluated by [facility] for liver transplant),  DM (off medications), and HTN, presents to the ED for evaluation of fever and malaise.    #sepsis likely viral syndrome  WBC Count: 3.50 K/uL (11.28.19 @ 05:14)  afebrile past 24hrs  workup negative to date  symptomatically improved   -prepare for discharge    #hyponatremia   Sodium, Serum: 138 mmol/L (11.28.19 @ 05:14)  -resolved    #T2DM  Hemoglobin A1C, Whole Blood: 10.3  not on medications at home  given liver cirrhosis not a candidate for metformin  ideally insulin however, given hearing impairment there lies a potential for medication non compliance  -endocrine eval for optimal medication regimen given the liver cirrhosis    # chronic HCV with HCC  -outpt follow up    #thrombocytopenia 2/2 cirrhosis   stable      Progress Note Handoff  Pending: endocrine for diabetes medication recs  Patient/Family discussion: questions and concerns addressed with pt  Disposition: home once seen by endo and after diabetic teaching to check FS

## 2019-11-28 NOTE — CONSULT NOTE ADULT - ATTENDING COMMENTS
try pioglitazone 30 mg. daily, and sitagliptin 100 mg. daily, please schedule follow up in diabetes clinic [id number] for next wednesday or thursday as patient may require injections as well, needs contour next EZ meter, test strips, and lancets. avoid all refined carbohydrates, in particular regular soda, fruit juices etc.

## 2019-11-28 NOTE — DISCHARGE NOTE NURSING/CASE MANAGEMENT/SOCIAL WORK - PATIENT PORTAL LINK FT
You can access the FollowMyHealth Patient Portal offered by [facility] by registering at the following website: [email or web address]. By joining [facility]’s FollowMyHealth portal, you will also be able to view your health information using other applications (apps) compatible with our system.

## 2019-11-28 NOTE — DISCHARGE NOTE PROVIDER - HOSPITAL COURSE
Mr. Delacruz is a 54 y.o male patient with hx of being deaf, Chronic HCV virus complicated by HCC (underwent TARE with Yttrium-90 on 10/06/2016. 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 He was supposed to evaluated by [facility] for liver transplant),  DM (off medications), and HTN, presents to the ED for evaluation of fever and malaise.        # Sepsis - Unknown etiology    -Tachycardia on admission with fever of 101F. afebrile now    - Unclear etiology, no pneumonia on chest xray.     - No urinary symptoms, Urinalysis negative for infection    -CT scan of the abdomen --> negative    - Blood cultures --> negative so far    - RSV negative.    - follow up urine legionella --> negative    - took 3 days of ceftriaxone and azithromycin     -ID consult --> most likely viral illness --> d/c abx    - leukopenia probably due to viral illness        #Hyponatremia    -129 --> 132 --> 138    - no need for fluids    -Monitor sodium levels        # DM    - off meds at home    - HBA1C --> 10.3        # HTN    off meds at home        # Chronic HCV infection and HCC     - underwent TARE with Yttrium-90 on 10/06/2016. 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    -  DR. WHITLEY refereed him for liver transplant to [place]    - Elevated alk phos and bilirubin.     -RUQ sono unremarkable    -Hb dropped from 15.6 to 13.0    -Will do CT scan of the abdomen noncon to rule out any bleed --> negative            # Thrombocytopenia    - Chronic due to liver cirrhosis.     - Stable at >50

## 2019-11-28 NOTE — DISCHARGE NOTE PROVIDER - NSDCCPCAREPLAN_GEN_ALL_CORE_FT
PRINCIPAL DISCHARGE DIAGNOSIS  Diagnosis: Viral illness  Assessment and Plan of Treatment: you had a viral illness. come back to ED if you had a high grade fever of any other worrisome symptoms.      SECONDARY DISCHARGE DIAGNOSES  Diagnosis: Liver cirrhosis  Assessment and Plan of Treatment: you have liver cirrhosis due to hep C. follow up with your primary doctor for possible liver transplant    Diagnosis: Diabetes  Assessment and Plan of Treatment: please take your new medications for diabetes and follow up with endocrinology Dr. Huber in clinic

## 2019-11-28 NOTE — DISCHARGE NOTE PROVIDER - NSDCFUADDAPPT_GEN_ALL_CORE_FT
please call [phone] and take an appointment next wednesday or thursday with Dr. Huber Diabetes doctor    follow up with your outside primary doctor

## 2019-11-28 NOTE — CONSULT NOTE ADULT - ASSESSMENT
obesity, HEP C and uncontrolled type 2 diabetes.
54 y.o male patient with hx of being deaf, Chronic HCV virus complicated by HCC (underwent TARE with Yttrium-90 on 10/06/2016. 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 He was supposed to evaluated by [facility] for liver transplant),  DM (off medications), and HTN, presents to the ED for evaluation of fever and malaise.    IMPRESSION:  # Cryptogenic sepsis on admission which has resolved since then  -CT chest/abdomen unrevealing  -US abdomen unremarkable  -CXR no consolidation  -clinically has no organ specific complaints  -PE unremarkable  -WBC 3.6  -BCX /UCx/RVP NG    RECOMMENDATIONS:  -no need for ABx

## 2019-11-28 NOTE — DISCHARGE NOTE NURSING/CASE MANAGEMENT/SOCIAL WORK - NSDCFUADDAPPT_GEN_ALL_CORE_FT
please call [phone] and take an appointment next wednesday or thursday with Dr. Huber Diabetes doctor    follow up with your outside primary doctor

## 2019-11-28 NOTE — PROGRESS NOTE ADULT - SUBJECTIVE AND OBJECTIVE BOX
AVINASH JUDI  54y, Male  Allergy: No Known Allergies  Hospital Day: 3d      Patient was seen and examined at bedside communication via writing. no active complaints at this time.       VITALS:  T(F): 97.4 (11-28-19 @ 07:34), Max: 98.4 (11-27-19 @ 23:50)  HR: 75 (11-28-19 @ 07:34)  BP: 124/63 (11-28-19 @ 07:34) (120/62 - 124/63)  RR: 19 (11-28-19 @ 07:34)  SpO2: --    PHYSICAL EXAM:  GENERAL: well developed well nourished in no acute distress  NECK: No evidence of swelling no palpable lymphadenopathy  CHEST/LUNG: clear to ausculation bilaterally no wheezes, rales, or rhonchi   HEART/VASC: RRR, No murmurs, rubs, or gallops appreciated, 2+ equal bilateral radial pulse  ABDOMEN: Soft, non tender non distended +bowel sounds in all quadrants, no palpable organomegaly   EXTREMITIES:  No clubbing and range of motion intact     TESTS & MEASUREMENTS:  Weight (Kg):                             12.9   3.50  )-----------( 63       ( 28 Nov 2019 05:14 )             36.4     PT/INR - ( 26 Nov 2019 16:50 )   PT: 16.90 sec;   INR: 1.48 ratio         PTT - ( 26 Nov 2019 16:50 )  PTT:36.0 sec  11-28    138  |  109  |  12  ----------------------------<  188<H>  3.6   |  21  |  0.5<L>    Ca    8.2<L>      28 Nov 2019 05:14  Mg     1.4     11-28    TPro  5.7<L>  /  Alb  2.5<L>  /  TBili  1.0  /  DBili  x   /  AST  68<H>  /  ALT  51<H>  /  AlkPhos  113  11-28    LIVER FUNCTIONS - ( 28 Nov 2019 05:14 )  Alb: 2.5 g/dL / Pro: 5.7 g/dL / ALK PHOS: 113 U/L / ALT: 51 U/L / AST: 68 U/L / GGT: x                 Culture - Blood (collected 11-25-19 @ 16:35)  Source: .Blood Blood-Peripheral  Preliminary Report (11-26-19 @ 23:01):    No growth to date.    Culture - Blood (collected 11-25-19 @ 16:35)  Source: .Blood Blood-Peripheral  Preliminary Report (11-26-19 @ 23:01):    No growth to date.    Culture - Urine (collected 11-25-19 @ 15:37)  Source: .Urine Clean Catch (Midstream)  Final Report (11-27-19 @ 01:50):    No growth      MEDICATIONS:  MEDICATIONS  (STANDING):  chlorhexidine 4% Liquid 1 Application(s) Topical <User Schedule>  dextrose 5%. 1000 milliLiter(s) (50 mL/Hr) IV Continuous <Continuous>  dextrose 50% Injectable 12.5 Gram(s) IV Push once  dextrose 50% Injectable 25 Gram(s) IV Push once  dextrose 50% Injectable 25 Gram(s) IV Push once  insulin glargine Injectable (LANTUS) 24 Unit(s) SubCutaneous at bedtime  insulin lispro (HumaLOG) corrective regimen sliding scale   SubCutaneous three times a day before meals  insulin lispro Injectable (HumaLOG) 8 Unit(s) SubCutaneous three times a day before meals  pantoprazole    Tablet 40 milliGRAM(s) Oral before breakfast    MEDICATIONS  (PRN):  acetaminophen   Tablet .. 650 milliGRAM(s) Oral every 6 hours PRN Temp greater or equal to 38C (100.4F), Mild Pain (1 - 3)  dextrose 40% Gel 15 Gram(s) Oral once PRN Blood Glucose LESS THAN 70 milliGRAM(s)/deciliter  glucagon  Injectable 1 milliGRAM(s) IntraMuscular once PRN Glucose LESS THAN 70 milligrams/deciliter  guaiFENesin   Syrup  (Sugar-Free) 200 milliGRAM(s) Oral every 6 hours PRN Cough  ketorolac   Injectable 30 milliGRAM(s) IV Push every 8 hours PRN Moderate Pain (4 - 6)

## 2019-11-28 NOTE — CONSULT NOTE ADULT - SUBJECTIVE AND OBJECTIVE BOX
Reason for Endocrinology Consult: Diabetes    HPI: 54y Male    Diabetes Type:  Duration of Diabetes:  Diabetes Complications:  History of DKA?  Eye doctor within past year?  Current Therapy:      Home FSG:  Fasting:  Lunch:  Dinner:  Bedtime:    Hypoglycemia?    Neuroglycopenia within past year?    Outpatient Endocrinologist?    PAST MEDICAL & SURGICAL HISTORY:  Hepatocellular carcinoma  History of hepatitis C  Liver cancer  DM (diabetes mellitus)  No significant past surgical history    FAMILY HISTORY:      SH:  Smoking  Etoh:  Recreational Drugs:    Home Medications:      Current (Non-Endocrine) Meds:  acetaminophen   Tablet .. 650 milliGRAM(s) Oral every 6 hours PRN  chlorhexidine 4% Liquid 1 Application(s) Topical <User Schedule>  dextrose 5%. 1000 milliLiter(s) IV Continuous <Continuous>  guaiFENesin   Syrup  (Sugar-Free) 200 milliGRAM(s) Oral every 6 hours PRN  ketorolac   Injectable 30 milliGRAM(s) IV Push every 8 hours PRN  magnesium sulfate  IVPB 2 Gram(s) IV Intermittent once  pantoprazole    Tablet 40 milliGRAM(s) Oral before breakfast      Current Endocrine Meds:   dextrose 40% Gel 15 Gram(s) Oral once PRN  dextrose 50% Injectable 12.5 Gram(s) IV Push once  dextrose 50% Injectable 25 Gram(s) IV Push once  dextrose 50% Injectable 25 Gram(s) IV Push once  glucagon  Injectable 1 milliGRAM(s) IntraMuscular once PRN  insulin glargine Injectable (LANTUS) 24 Unit(s) SubCutaneous at bedtime  insulin lispro (HumaLOG) corrective regimen sliding scale   SubCutaneous three times a day before meals  insulin lispro Injectable (HumaLOG) 8 Unit(s) SubCutaneous three times a day before meals      Allergies:  No Known Allergies      ROS:  Denies the following except as indicated.    General: weight loss/weight gain, decreased appetite, fatigue, fever  Eyes: blurry vision, double vision  ENT: neck swelling, dysphagia, voice changes   CV: palpitations, SOB, chest pain, cough  GI: nausea, vomiting, diarrhea, constipation, abdominal pain  : nocturia,  polyuria, dysuria  Endo: decreased libido, heat/cold intolerance, jitteriness  MSK: arthralgias, myalgias  Skin: rash, dryness, diaphoresis  Neuro: pedal numbness,pedal paresthesias, pedal pain      Weight (kg): 96.6 (11-25 @ 14:25)    Vital Signs Last 24 Hrs  T(C): 36.3 (28 Nov 2019 07:34), Max: 36.9 (27 Nov 2019 23:50)  T(F): 97.4 (28 Nov 2019 07:34), Max: 98.4 (27 Nov 2019 23:50)  HR: 75 (28 Nov 2019 07:34) (75 - 79)  BP: 124/63 (28 Nov 2019 07:34) (120/62 - 124/63)  BP(mean): --  RR: 19 (28 Nov 2019 07:34) (18 - 19)  SpO2: --  Constitutional: WN/WD in NAD.   Neck: no thyromegaly or palpable thyroid nodules   Respiratory: lungs CTAB.  Cardiovascular: regular rate and rhythm, normal S1 and S2, no audible murmurs  GI: soft, NT/ND, no masses/HSM appreciated.  Ext: no edema, no ulcers, pedal pulses palpable bilaterally  Neurology: no tremor, monofilament sensation intact in feet  Psychiatric: A&O x 3, normal affect/mood.        LABS:                        12.9   3.50  )-----------( 63       ( 28 Nov 2019 05:14 )             36.4     11-28    138  |  109  |  12  ----------------------------<  188<H>  3.6   |  21  |  0.5<L>    Ca    8.2<L>      28 Nov 2019 05:14  Mg     1.4     11-28    TPro  5.7<L>  /  Alb  2.5<L>  /  TBili  1.0  /  DBili  x   /  AST  68<H>  /  ALT  51<H>  /  AlkPhos  113  11-28    PT/INR - ( 26 Nov 2019 16:50 )   PT: 16.90 sec;   INR: 1.48 ratio         PTT - ( 26 Nov 2019 16:50 )  PTT:36.0 sec    Hemoglobin A1C, Whole Blood: 10.3 (11-26 @ 07:18)                         Thyroid Stimulating Hormone, Serum: 1.52 (11-26 @ 07:18)          RADIOLOGY & ADDITIONAL STUDIES:    A/P:54yMale    1.  DM  For now:  Glargine-    units at bedtime  Lispro-    units three times a day before meals   Will continue to monitor     At discharge, recommend:      Pt can follow up at discharge with:    Above discussed with resident.

## 2019-11-30 LAB
CULTURE RESULTS: SIGNIFICANT CHANGE UP
CULTURE RESULTS: SIGNIFICANT CHANGE UP
SPECIMEN SOURCE: SIGNIFICANT CHANGE UP
SPECIMEN SOURCE: SIGNIFICANT CHANGE UP

## 2019-11-30 RX ORDER — METFORMIN HYDROCHLORIDE 850 MG/1
1 TABLET ORAL
Qty: 10 | Refills: 0
Start: 2019-11-30

## 2019-11-30 NOTE — CHART NOTE - NSCHARTNOTEFT_GEN_A_CORE
I was contacted by patient's son [phone]  Maria Elena needs prior auth and patient's has no PMD  hospitalist on call dr Richardson contacted  metformin sent to the pharmacy (last cr=0.5)  the patient's son was instructed to call dr Burger's office for an appointment on monday

## 2019-12-01 ENCOUNTER — OUTPATIENT (OUTPATIENT)
Dept: OUTPATIENT SERVICES | Facility: HOSPITAL | Age: 54
LOS: 1 days | End: 2019-12-01
Payer: MEDICAID

## 2019-12-01 PROCEDURE — G9001: CPT

## 2019-12-04 DIAGNOSIS — E11.9 TYPE 2 DIABETES MELLITUS WITHOUT COMPLICATIONS: ICD-10-CM

## 2019-12-04 DIAGNOSIS — B34.9 VIRAL INFECTION, UNSPECIFIED: ICD-10-CM

## 2019-12-04 DIAGNOSIS — H91.90 UNSPECIFIED HEARING LOSS, UNSPECIFIED EAR: ICD-10-CM

## 2019-12-04 DIAGNOSIS — E87.1 HYPO-OSMOLALITY AND HYPONATREMIA: ICD-10-CM

## 2019-12-04 DIAGNOSIS — A41.9 SEPSIS, UNSPECIFIED ORGANISM: ICD-10-CM

## 2019-12-04 DIAGNOSIS — D69.6 THROMBOCYTOPENIA, UNSPECIFIED: ICD-10-CM

## 2019-12-04 DIAGNOSIS — K74.69 OTHER CIRRHOSIS OF LIVER: ICD-10-CM

## 2019-12-04 DIAGNOSIS — I10 ESSENTIAL (PRIMARY) HYPERTENSION: ICD-10-CM

## 2019-12-04 DIAGNOSIS — B18.2 CHRONIC VIRAL HEPATITIS C: ICD-10-CM

## 2019-12-04 DIAGNOSIS — C22.0 LIVER CELL CARCINOMA: ICD-10-CM

## 2019-12-04 PROBLEM — Z86.19 PERSONAL HISTORY OF OTHER INFECTIOUS AND PARASITIC DISEASES: Chronic | Status: ACTIVE | Noted: 2019-11-25

## 2019-12-04 PROBLEM — C22.9 MALIGNANT NEOPLASM OF LIVER, NOT SPECIFIED AS PRIMARY OR SECONDARY: Chronic | Status: ACTIVE | Noted: 2019-11-25

## 2019-12-13 DIAGNOSIS — Z71.89 OTHER SPECIFIED COUNSELING: ICD-10-CM

## 2020-02-11 ENCOUNTER — APPOINTMENT (OUTPATIENT)
Dept: INTERNAL MEDICINE | Facility: CLINIC | Age: 55
End: 2020-02-11
Payer: MEDICAID

## 2020-02-11 ENCOUNTER — OUTPATIENT (OUTPATIENT)
Dept: OUTPATIENT SERVICES | Facility: HOSPITAL | Age: 55
LOS: 1 days | Discharge: HOME | End: 2020-02-11

## 2020-02-11 VITALS
SYSTOLIC BLOOD PRESSURE: 131 MMHG | DIASTOLIC BLOOD PRESSURE: 79 MMHG | BODY MASS INDEX: 32.14 KG/M2 | WEIGHT: 217 LBS | HEART RATE: 91 BPM | HEIGHT: 69 IN

## 2020-02-11 PROCEDURE — 99213 OFFICE O/P EST LOW 20 MIN: CPT | Mod: GC

## 2020-02-12 ENCOUNTER — APPOINTMENT (OUTPATIENT)
Dept: ENDOCRINOLOGY | Facility: CLINIC | Age: 55
End: 2020-02-12

## 2020-02-12 NOTE — ASSESSMENT
[FreeTextEntry1] : 53 y/o M with HCC s/p embolization here for follow-up.\par \par # DMII\par - last HBA1C 6.0 - 3 years ago \par - will go for repeat CBC, CMP, Hba1C tomorrow \par - waiting for repeat blood work to renew medication \par \par # Hepatocellular carcinoma: Embolization done\par -  liver transplant list? \par - will refer to heme onc \par - Needs to follow up with [place]\par \par 3 - HCM\par  - Active smoker: Recommended and advised to stop smoking.\par  - not interested in quiting \par -active drinker, not interested in quiting

## 2020-02-12 NOTE — ADDENDUM
[FreeTextEntry1] : Very poor prognosis. Daughter reports that pt has been diagnosed for 3 years with HCC, still actively smoking and drinking. Has NOT followed up with any doctors including [facility]. \par Encouraged to see oncologist \par

## 2020-02-25 DIAGNOSIS — Z23 ENCOUNTER FOR IMMUNIZATION: ICD-10-CM

## 2020-02-25 DIAGNOSIS — F17.200 NICOTINE DEPENDENCE, UNSPECIFIED, UNCOMPLICATED: ICD-10-CM

## 2020-02-25 DIAGNOSIS — C22.0 LIVER CELL CARCINOMA: ICD-10-CM

## 2020-02-25 DIAGNOSIS — B18.2 CHRONIC VIRAL HEPATITIS C: ICD-10-CM

## 2020-02-25 DIAGNOSIS — Z00.00 ENCOUNTER FOR GENERAL ADULT MEDICAL EXAMINATION WITHOUT ABNORMAL FINDINGS: ICD-10-CM

## 2020-02-25 DIAGNOSIS — E11.9 TYPE 2 DIABETES MELLITUS WITHOUT COMPLICATIONS: ICD-10-CM

## 2020-06-19 ENCOUNTER — OUTPATIENT (OUTPATIENT)
Dept: OUTPATIENT SERVICES | Facility: HOSPITAL | Age: 55
LOS: 1 days | Discharge: HOME | End: 2020-06-19

## 2020-06-19 ENCOUNTER — OUTPATIENT (OUTPATIENT)
Dept: OUTPATIENT SERVICES | Facility: HOSPITAL | Age: 55
LOS: 1 days | Discharge: HOME | End: 2020-06-19
Payer: MEDICAID

## 2020-06-19 ENCOUNTER — APPOINTMENT (OUTPATIENT)
Dept: INTERNAL MEDICINE | Facility: CLINIC | Age: 55
End: 2020-06-19
Payer: MEDICAID

## 2020-06-19 ENCOUNTER — RESULT REVIEW (OUTPATIENT)
Age: 55
End: 2020-06-19

## 2020-06-19 VITALS
BODY MASS INDEX: 34.36 KG/M2 | DIASTOLIC BLOOD PRESSURE: 88 MMHG | TEMPERATURE: 97.9 F | WEIGHT: 232 LBS | HEIGHT: 69 IN | SYSTOLIC BLOOD PRESSURE: 134 MMHG | HEART RATE: 103 BPM

## 2020-06-19 DIAGNOSIS — M25.512 PAIN IN LEFT SHOULDER: ICD-10-CM

## 2020-06-19 PROCEDURE — 99213 OFFICE O/P EST LOW 20 MIN: CPT | Mod: GC

## 2020-06-19 PROCEDURE — 73030 X-RAY EXAM OF SHOULDER: CPT | Mod: 26,LT

## 2020-06-24 LAB
ALBUMIN SERPL ELPH-MCNC: 4 G/DL
ALP BLD-CCNC: 141 U/L
ALT SERPL-CCNC: 80 U/L
ANION GAP SERPL CALC-SCNC: 15 MMOL/L
AST SERPL-CCNC: 95 U/L
BASOPHILS # BLD AUTO: 0.04 K/UL
BASOPHILS NFR BLD AUTO: 0.9 %
BILIRUB SERPL-MCNC: 1.1 MG/DL
BUN SERPL-MCNC: 14 MG/DL
CALCIUM SERPL-MCNC: 9.6 MG/DL
CHLORIDE SERPL-SCNC: 103 MMOL/L
CHOLEST SERPL-MCNC: 193 MG/DL
CHOLEST/HDLC SERPL: 3 RATIO
CO2 SERPL-SCNC: 22 MMOL/L
CREAT ?TM UR-MCNC: 173 MG/DL
CREAT SERPL-MCNC: 0.7 MG/DL
EOSINOPHIL # BLD AUTO: 0.01 K/UL
EOSINOPHIL NFR BLD AUTO: 0.2 %
ESTIMATED AVERAGE GLUCOSE: 108 MG/DL
GLUCOSE SERPL-MCNC: 155 MG/DL
HBA1C MFR BLD HPLC: 5.4 %
HCT VFR BLD CALC: 45.7 %
HDLC SERPL-MCNC: 64 MG/DL
HGB BLD-MCNC: 15.4 G/DL
IMM GRANULOCYTES NFR BLD AUTO: 0.2 %
LDLC SERPL CALC-MCNC: 122 MG/DL
LYMPHOCYTES # BLD AUTO: 1.34 K/UL
LYMPHOCYTES NFR BLD AUTO: 28.8 %
MAN DIFF?: NORMAL
MCHC RBC-ENTMCNC: 32.1 PG
MCHC RBC-ENTMCNC: 33.7 G/DL
MCV RBC AUTO: 95.2 FL
MICROALBUMIN 24H UR DL<=1MG/L-MCNC: 21.7 MG/DL
MICROALBUMIN 24H UR DL<=1MG/L-MRATE: NORMAL MG/24HR
MICROALBUMIN ?TM UR-MRATE: NORMAL UG/MIN
MONOCYTES # BLD AUTO: 0.39 K/UL
MONOCYTES NFR BLD AUTO: 8.4 %
NEUTROPHILS # BLD AUTO: 2.87 K/UL
NEUTROPHILS NFR BLD AUTO: 61.5 %
PLATELET # BLD AUTO: 71 K/UL
POTASSIUM SERPL-SCNC: 4 MMOL/L
PROT SERPL-MCNC: 7.8 G/DL
RBC # BLD: 4.8 M/UL
RBC # FLD: 14.7 %
SODIUM SERPL-SCNC: 140 MMOL/L
TRIGL SERPL-MCNC: 86 MG/DL
WBC # FLD AUTO: 4.66 K/UL

## 2020-06-29 ENCOUNTER — APPOINTMENT (OUTPATIENT)
Dept: HEMATOLOGY ONCOLOGY | Facility: CLINIC | Age: 55
End: 2020-06-29
Payer: MEDICAID

## 2020-06-29 ENCOUNTER — LABORATORY RESULT (OUTPATIENT)
Age: 55
End: 2020-06-29

## 2020-06-29 VITALS
HEIGHT: 69 IN | BODY MASS INDEX: 35.4 KG/M2 | TEMPERATURE: 98 F | DIASTOLIC BLOOD PRESSURE: 72 MMHG | RESPIRATION RATE: 16 BRPM | SYSTOLIC BLOOD PRESSURE: 133 MMHG | HEART RATE: 69 BPM | WEIGHT: 239 LBS

## 2020-06-29 LAB
HCT VFR BLD CALC: 43.2 %
HGB BLD-MCNC: 15.1 G/DL
MCHC RBC-ENTMCNC: 32.9 PG
MCHC RBC-ENTMCNC: 35 G/DL
MCV RBC AUTO: 94.1 FL
PLATELET # BLD AUTO: 70 K/UL
PMV BLD: 10.3 FL
RBC # BLD: 4.59 M/UL
RBC # FLD: 14.2 %
WBC # FLD AUTO: 3.84 K/UL

## 2020-06-29 PROCEDURE — 99214 OFFICE O/P EST MOD 30 MIN: CPT

## 2020-06-29 NOTE — HISTORY OF PRESENT ILLNESS
[Disease: _____________________] : Disease: [unfilled] [de-identified] : Contact Information ([facility]):\par Rere Myers, Senior  - [phone]\par    Jah Ramirez - [phone]\par Kentrell Hernandez, Transplant Coordinator Assistant - [phone]\par Saad Ma, Transplant Coordinator - [phone]\par Josephine Abel,  - [phone] [de-identified] : 51/F with hepatocellular carcinoma secondary to hepatitis C-induced liver cirrhosis.  He underwent TARE with Yttrium-90 on 10/06/2016.  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  He is currently being evaluated by [facility] for liver transplant.  He is pending an endoscopy and colonoscopy as part of his pretransplant evaluation.\par \par He was seen in the ED last month for RUQ pain.  He was prescribed oxycodone and is requesting a refill.  [de-identified] : 2/22/17\par He underwent a colonoscopy 01/2017 one hyperplastic polyp in rectum and tubular adenoma in sigmoid. fair prep in right colon but views are good.\par will repeat colonoscopy in 3-5 years EGD- small varices. No stigmata. repeat 1-2 years. His mom has a massive stroke and son is now in residential. He missed his appointment in [facility] on 2/7/17. They cant reach him.  He feels well otherwise. I spoke with them and arranged follow up with [facility] on March 2nd at 11:30 am. I gave him address and MAP of the area so he does not get lost. \par \par 6/29/20\par He is here for follow up. He was lost to follow up.  He was not showing up to [facility] apparently since he did not want surgery done and decided to come off the transplant list. His most recent blood work showed.  thrombocytopenia but lumps were seen, and CMP showed mild AST/ALT elevations from 6/2020.\par He is here with Yadira who is our sing  \par

## 2020-06-29 NOTE — ASSESSMENT
[FreeTextEntry1] : 54/M with hepatocellular carcinoma secondary to hepatitis C-associated cirrhosis s/p TARE with Y90  complicated by dissection of vessels. He decided not to proceed with a liver  transplant. Was lost to follow up now for over  3 years\par \par His thrombocytopenia maybe due to clumps vs hep C cirrhosis\par -stable will monitor \par -will check CBC in heparin tube today to make r/o pseudothrombocytopenia \par \par Plan:\par -will check AFP and CT C/A/P  and will proceed from three\par -RTC in 4 weeks \par -I informed him he he needs to be on ASA  81 mg due to complication form TARE.\par -check CBC in heparin tube today to make r/o pseudothrombocytopenia \par \par CEll:   \par Ext [phone]  or [phone] to reach Matias via video phone \par \par \par \par \par

## 2020-06-30 DIAGNOSIS — F17.200 NICOTINE DEPENDENCE, UNSPECIFIED, UNCOMPLICATED: ICD-10-CM

## 2020-06-30 DIAGNOSIS — C22.0 LIVER CELL CARCINOMA: ICD-10-CM

## 2020-06-30 DIAGNOSIS — E11.9 TYPE 2 DIABETES MELLITUS WITHOUT COMPLICATIONS: ICD-10-CM

## 2020-06-30 DIAGNOSIS — M25.512 PAIN IN LEFT SHOULDER: ICD-10-CM

## 2020-06-30 LAB — AFP-TM SERPL-MCNC: 1.8 NG/ML

## 2020-07-06 ENCOUNTER — APPOINTMENT (OUTPATIENT)
Dept: PODIATRY | Facility: CLINIC | Age: 55
End: 2020-07-06
Payer: MEDICAID

## 2020-07-06 ENCOUNTER — OUTPATIENT (OUTPATIENT)
Dept: OUTPATIENT SERVICES | Facility: HOSPITAL | Age: 55
LOS: 1 days | Discharge: HOME | End: 2020-07-06

## 2020-07-06 PROCEDURE — 99203 OFFICE O/P NEW LOW 30 MIN: CPT

## 2020-07-06 NOTE — HISTORY OF PRESENT ILLNESS
[FreeTextEntry1] : follow up\par \par  services used Shanice GOMEZ  [de-identified] : 53 yo M with pmh of HCC s/p embolization, DMII, presents for follow up. He reports left shoulder pain which he uses CBD oil/balm for which helps. He also reports that he did not want to get liver surgery done and took himself off the transplant list. The patient notes L shoulder pain especially when hi brings his arm over his head. Otherwise the patient denies other complaints.

## 2020-07-06 NOTE — ASSESSMENT
[FreeTextEntry1] : 53 yo M with pmh of HCC s/p embolization, DMII, presents for follow up. He reports left shoulder pain which he uses CBD oil/balm for which helps. He also reports that he did not want to get liver surgery done and took himself off the transplant list. The patient notes L shoulder pain especially when hi brings his arm over his head. Otherwise the patient denies other complaints. \par \par   # DM II\par  - last A1C 2017 : 6 \par - fasting CBC, CMP, lipid panel, HbA1C today\par  - urine microalbumin today\par  - c/w alogliptin, metformin, pioglitazone \par - f/u ophthalmology, podiatry \par  - needs to be on ace or arb inhibitor , will check Cr and add on if kidney function wnl \par  - can add on a statin now as the patient is no longer pursuing liver transplant \par \par # L shoulder pain\par - r/o rotator cuff tear\par - uses cbd balm which helps\par - will get L shoulder XRAY \par \par # HCC s/p embolization \par - took himself off transplant list ; does not want surgery \par - needs referral to heme/onc \par \par     # HCM - active smoker, recommended and advised to stop\par  - active drinker 2-3 drinks per day\par  - advised to stop drinking \par  - last colonoscopy 2017 with Dr. Velez \par  - AAA screen not indicated yet\par  - CT Chest for lung cancer screening not indicated yet\par   \par RTC in 6 months

## 2020-07-06 NOTE — PHYSICAL EXAM
[Normal] : normal rate, regular rhythm, normal S1 and S2 and no murmur heard [Non Tender] : non-tender [Normal Bowel Sounds] : normal bowel sounds [de-identified] : distended abdomen with enlarged liver ; no rebound no guarding

## 2020-07-07 NOTE — REVIEW OF SYSTEMS
[As Noted in HPI] : as noted in HPI [Toenail Deformity] : toenail deformity [Toenail Discoloration] : toenail discoloration [Negative] : Constitutional [Toenail Thickening] : toenail thickening

## 2020-07-07 NOTE — PROCEDURE
[FreeTextEntry1] : Diabetic Foot Care and Risk Assessment;;\par Tinea Pedis\par \par Patient was seen and evaluated; \par RX'd Ciclopirox; \par Patient educated about diabetic footcare; Proper Maintenance; \par Referral to Neurosurgery for Shooting Lower Backpain; \par Referral to Vascular:  patient gives questionable h/o of claudicating symptoms, likely neurologic in nature\par Patient advised to return back to clinic in 1 year; \par \par

## 2020-07-07 NOTE — PHYSICAL EXAM
[Impaired Insight] : insight and judgment were intact [Oriented To Time, Place, And Person] : oriented to person, place, and time [Vibration Dec.] : normal vibratory sensation at the level of the toes [Diminished Throughout Right Foot] : normal tactile sensation with monofilament testing throughout right foot [Diminished Throughout Left Foot] : normal tactile sensation with monofilament testing throughout left foot [FreeTextEntry1] : thick, dystrophic, discolored nails with subungual debris x 9

## 2020-07-07 NOTE — END OF VISIT
[FreeTextEntry3] : -Loss of protective sensation:   no\par -Presence of foot deformity:   no \par -presence of pre-ulcerative lesion: no\par   -hemorrhage into callus: no\par -atrophy of heel or metatarsal fat pads:  no\par \par -Diabetic neurovascular foot assessment  performed. \par -Discussed with patient diabetic foot hygiene.Patient instructed to regularly check the bottom of the feet\par -Patient given a diabetic foot education sheet\par -Return 12 month\par

## 2020-07-07 NOTE — REASON FOR VISIT
[ Service] : provided by  Service [Initial Evaluation] : an initial evaluation [FreeTextEntry2] : Yadira Gates [TWNoteComboBox1] : American Sign Language

## 2020-07-08 DIAGNOSIS — E11.9 TYPE 2 DIABETES MELLITUS WITHOUT COMPLICATIONS: ICD-10-CM

## 2020-07-08 DIAGNOSIS — B35.1 TINEA UNGUIUM: ICD-10-CM

## 2020-07-08 DIAGNOSIS — M54.9 DORSALGIA, UNSPECIFIED: ICD-10-CM

## 2020-07-10 ENCOUNTER — OUTPATIENT (OUTPATIENT)
Dept: OUTPATIENT SERVICES | Facility: HOSPITAL | Age: 55
LOS: 1 days | Discharge: HOME | End: 2020-07-10
Payer: MEDICAID

## 2020-07-10 PROCEDURE — T1013: CPT

## 2020-07-10 PROCEDURE — 92014 COMPRE OPH EXAM EST PT 1/>: CPT

## 2020-07-14 ENCOUNTER — APPOINTMENT (OUTPATIENT)
Dept: INTERNAL MEDICINE | Facility: CLINIC | Age: 55
End: 2020-07-14

## 2020-07-16 ENCOUNTER — APPOINTMENT (OUTPATIENT)
Dept: HEMATOLOGY ONCOLOGY | Facility: CLINIC | Age: 55
End: 2020-07-16

## 2020-07-16 LAB
ALBUMIN SERPL ELPH-MCNC: 3.8 G/DL
ALP BLD-CCNC: 120 U/L
ALT SERPL-CCNC: 92 U/L
ANION GAP SERPL CALC-SCNC: 14 MMOL/L
AST SERPL-CCNC: 95 U/L
BILIRUB SERPL-MCNC: 0.8 MG/DL
BUN SERPL-MCNC: 11 MG/DL
CALCIUM SERPL-MCNC: 9.1 MG/DL
CHLORIDE SERPL-SCNC: 108 MMOL/L
CO2 SERPL-SCNC: 20 MMOL/L
CREAT SERPL-MCNC: 0.7 MG/DL
GLUCOSE SERPL-MCNC: 116 MG/DL
POTASSIUM SERPL-SCNC: 4 MMOL/L
PROT SERPL-MCNC: 7.3 G/DL
SODIUM SERPL-SCNC: 142 MMOL/L

## 2020-07-20 ENCOUNTER — APPOINTMENT (OUTPATIENT)
Dept: HEMATOLOGY ONCOLOGY | Facility: CLINIC | Age: 55
End: 2020-07-20

## 2020-07-22 ENCOUNTER — APPOINTMENT (OUTPATIENT)
Dept: HEMATOLOGY ONCOLOGY | Facility: CLINIC | Age: 55
End: 2020-07-22

## 2020-07-27 ENCOUNTER — APPOINTMENT (OUTPATIENT)
Dept: VASCULAR SURGERY | Facility: CLINIC | Age: 55
End: 2020-07-27
Payer: MEDICAID

## 2020-07-27 VITALS — WEIGHT: 215 LBS | BODY MASS INDEX: 31.75 KG/M2

## 2020-07-27 VITALS — SYSTOLIC BLOOD PRESSURE: 130 MMHG | DIASTOLIC BLOOD PRESSURE: 70 MMHG

## 2020-07-27 PROCEDURE — 99203 OFFICE O/P NEW LOW 30 MIN: CPT

## 2020-07-27 NOTE — HISTORY OF PRESENT ILLNESS
[FreeTextEntry1] : The patient presents complaining of radiating pain From his buttock region down to his ankle. The patient has a history of liver cancer.  The patient presents today for evaluation of his arterial hemodynamics.

## 2020-07-27 NOTE — ASSESSMENT
[FreeTextEntry1] : the patient is a 55-year-old male with A. history of liver cancer and diabetes who presents complaining of leg pain which radiates from his buttock down to his ankles.The patient states it's like a "shocks.On physical exam the patient has normal arterial hemodynamics and palpable pulses bilaterally. I recommend an evaluation of his lower back.  No  vascular surgery intervention at this time follow up on a p.r.n. basis

## 2020-07-28 ENCOUNTER — OUTPATIENT (OUTPATIENT)
Dept: OUTPATIENT SERVICES | Facility: HOSPITAL | Age: 55
LOS: 1 days | Discharge: HOME | End: 2020-07-28
Payer: MEDICAID

## 2020-07-28 ENCOUNTER — RESULT REVIEW (OUTPATIENT)
Age: 55
End: 2020-07-28

## 2020-07-28 DIAGNOSIS — C22.0 LIVER CELL CARCINOMA: ICD-10-CM

## 2020-07-28 PROCEDURE — 74178 CT ABD&PLV WO CNTR FLWD CNTR: CPT | Mod: 26

## 2020-07-28 PROCEDURE — 71260 CT THORAX DX C+: CPT | Mod: 26

## 2020-07-29 ENCOUNTER — APPOINTMENT (OUTPATIENT)
Dept: NEUROSURGERY | Facility: CLINIC | Age: 55
End: 2020-07-29
Payer: MEDICAID

## 2020-07-29 VITALS — HEIGHT: 69 IN | BODY MASS INDEX: 31.84 KG/M2 | WEIGHT: 215 LBS

## 2020-07-29 DIAGNOSIS — R06.2 WHEEZING: ICD-10-CM

## 2020-07-29 DIAGNOSIS — B35.1 TINEA UNGUIUM: ICD-10-CM

## 2020-07-29 DIAGNOSIS — Z87.891 PERSONAL HISTORY OF NICOTINE DEPENDENCE: ICD-10-CM

## 2020-07-29 DIAGNOSIS — Z86.39 PERSONAL HISTORY OF OTHER ENDOCRINE, NUTRITIONAL AND METABOLIC DISEASE: ICD-10-CM

## 2020-07-29 DIAGNOSIS — Z87.19 PERSONAL HISTORY OF OTHER DISEASES OF THE DIGESTIVE SYSTEM: ICD-10-CM

## 2020-07-29 PROCEDURE — 99204 OFFICE O/P NEW MOD 45 MIN: CPT

## 2020-08-03 ENCOUNTER — OUTPATIENT (OUTPATIENT)
Dept: OUTPATIENT SERVICES | Facility: HOSPITAL | Age: 55
LOS: 1 days | Discharge: HOME | End: 2020-08-03

## 2020-08-03 ENCOUNTER — APPOINTMENT (OUTPATIENT)
Dept: HEMATOLOGY ONCOLOGY | Facility: CLINIC | Age: 55
End: 2020-08-03
Payer: MEDICAID

## 2020-08-03 VITALS
HEART RATE: 102 BPM | HEIGHT: 69 IN | WEIGHT: 240 LBS | BODY MASS INDEX: 35.55 KG/M2 | TEMPERATURE: 97.3 F | RESPIRATION RATE: 16 BRPM | DIASTOLIC BLOOD PRESSURE: 85 MMHG | SYSTOLIC BLOOD PRESSURE: 142 MMHG

## 2020-08-03 DIAGNOSIS — C22.0 LIVER CELL CARCINOMA: ICD-10-CM

## 2020-08-03 PROCEDURE — 99213 OFFICE O/P EST LOW 20 MIN: CPT

## 2020-08-03 NOTE — PHYSICAL EXAM
[General Appearance - Alert] : alert [Impaired Insight] : insight and judgment were intact [Oriented To Time, Place, And Person] : oriented to person, place, and time [General Appearance - In No Acute Distress] : in no acute distress [No Tenderness to Palpation] : no spine tenderness on palpation [Affect] : the affect was normal [Restricted] : was restricted [Pain] : was painful [Normal] : normal [Intact] : all reflexes within normal limits bilaterally [Straight-Leg Raise Test - Left] : straight leg raise of the left leg was negative [Straight-Leg Raise Test - Right] : straight leg raise  of the right leg was negative

## 2020-08-03 NOTE — ASSESSMENT
[FreeTextEntry1] : Mr. Delacruz presents with lower back pain and neurogenic claudication into the legs. I would like an MRI of the lumbar spine to asses s for underlying stenosis. I will see him back in 2 weeks to review the MRI. He is agreeable with our plan of care.

## 2020-08-03 NOTE — PLAN
[FreeTextEntry1] : given chronic progressive nature of his back and leg pain we will obtain an MRI of the lumbar spine without contrast in order to further develop a plan of care. I will see him back once imaging is obtained.

## 2020-08-03 NOTE — HISTORY OF PRESENT ILLNESS
[de-identified] : Mr. Delacruz has a 1 year history of lower back pain. Since March the pain has increased. With ambulation he has an increase in pain in the legs, along with a shooting pain in his heels. No bowel / bladder dysfunction. He has not had PT or injections. No imaging to review today.

## 2020-08-06 NOTE — CONSULT LETTER
[Please see my note below.] : Please see my note below. [Dear  ___] : Dear  [unfilled], [Courtesy Letter:] : I had the pleasure of seeing your patient, [unfilled], in my office today. [Consult Closing:] : Thank you very much for allowing me to participate in the care of this patient.  If you have any questions, please do not hesitate to contact me. [FreeTextEntry3] : Mitchell [Sincerely,] : Sincerely,

## 2020-08-06 NOTE — END OF VISIT
[] : Fellow [FreeTextEntry3] : He is doing well. He had an excellent response to y90. AFP is normal and  CT did not show progression. Needs to follow up with GI. Plateltes are stable \par RTC in 4 months will scan him maybe 2-3 times a year if AFP is stable

## 2020-08-06 NOTE — PHYSICAL EXAM
[Fully active, able to carry on all pre-disease performance without restriction] : Status 0 - Fully active, able to carry on all pre-disease performance without restriction [Normal] : normal appearance, no rash, nodules, vesicles, ulcers, erythema [de-identified] : Hearing impaired. Uses . [de-identified] : obese [de-identified] : Limited ROM of L shoulder secondary to pain.

## 2020-08-06 NOTE — ASSESSMENT
[FreeTextEntry1] : 55 yom with hepatocellular carcinoma secondary to hepatitis C-associated cirrhosis s/p TARE with Y90, which was complicated by dissection of vessels. He decided not to proceed with a liver transplant. Was lost to follow up for over 3 years.\par \par - AFP normal. CT chest/abdomen/pelvis demonstrated stable splenomegaly and cirrhotic liver with stable mass without internal enhancement, c/w prior Y90 treatment.\par - Continue aspirin 81 mg due to complications from TARE.\par - Patient advised to make appointment with primary care physician for left shoulder pain, which is likely musculoskeletal in origin.\par - Follow up in 4 months. Will order repeat bloodwork and CT scans then.\par \par #Thrombocytopenia is due to cirrhosis from hep C\par -he should follow up with GI for versicle surveillance  and hep C treatment at this point since he is doing well with his HCC if hep C was not eradicated in the past\par \par \par Cell: [phone]  \par Ext [phone]  or [phone] to reach Matias via video phone\par \par \par

## 2020-08-06 NOTE — HISTORY OF PRESENT ILLNESS
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  He underwent TARE with Yttrium-90 on 10/06/2016.  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  He is currently being evaluated by [facility] for liver transplant.  He is pending an endoscopy and colonoscopy as part of his pretransplant evaluation.\par \par He was seen in the ED last month for RUQ pain.  He was prescribed oxycodone and is requesting a refill.  [de-identified] : Contact Information ([facility]):\par Rere Myers, Senior  - [phone]\par    Jah Ramirez - [phone]\par Kentrell Hernandez, Transplant Coordinator Assistant - [phone]\par Saad Ma, Transplant Coordinator - [phone]\par Josephine Abel,  - [phone] [de-identified] : 2/22/17\par He underwent a colonoscopy 01/2017 one hyperplastic polyp in rectum and tubular adenoma in sigmoid. fair prep in right colon but views are good.\par will repeat colonoscopy in 3-5 years EGD- small varices. No stigmata. repeat 1-2 years. His mom has a massive stroke and son is now in [facility]. He missed his appointment in [facility] on 2/7/17. They cant reach him.  He feels well otherwise. I spoke with them and arranged follow up with Estefanía Corbett on March 2nd at 11:30 am. I gave him address and MAP of the area so he does not get lost. \par \par 6/29/20\par He is here for follow up. He was lost to follow up.  He was not showing up to [facility] apparently since he did not want surgery done and decided to come off the transplant list. His most recent blood work showed.  thrombocytopenia but lumps were seen, and CMP showed mild AST/ALT elevations from 6/2020.\par He is here with Yadira who is our sing  \par \par 8/3/2020\par Patient presents for follow up. He is accompanied by  Shanice. He complains of L shoulder pain, limiting his ability to move his left shoulder and making it difficult to sleep at night. He was previously seen by primary care physician regarding this and ordered for x-rays, which demonstrated degenerative changes. Testing from last visit revealed AFP of 1.8, CT chest without evidence of metastatic disease, and CT abdomen/pelvis only demonstrating unchanged splenomegaly and liver mass without internal enhancement (c/w prior Y90 treatment).\par

## 2020-08-10 DIAGNOSIS — Z85.05 PERSONAL HISTORY OF MALIGNANT NEOPLASM OF LIVER: ICD-10-CM

## 2020-08-12 ENCOUNTER — OUTPATIENT (OUTPATIENT)
Dept: OUTPATIENT SERVICES | Facility: HOSPITAL | Age: 55
LOS: 1 days | Discharge: HOME | End: 2020-08-12
Payer: MEDICAID

## 2020-08-12 DIAGNOSIS — M54.16 RADICULOPATHY, LUMBAR REGION: ICD-10-CM

## 2020-08-12 DIAGNOSIS — M54.5 LOW BACK PAIN: ICD-10-CM

## 2020-08-12 PROCEDURE — 72148 MRI LUMBAR SPINE W/O DYE: CPT | Mod: 26

## 2020-08-17 ENCOUNTER — APPOINTMENT (OUTPATIENT)
Dept: NEUROSURGERY | Facility: CLINIC | Age: 55
End: 2020-08-17
Payer: MEDICAID

## 2020-08-17 VITALS — WEIGHT: 240 LBS | HEIGHT: 69 IN | BODY MASS INDEX: 35.55 KG/M2

## 2020-08-17 DIAGNOSIS — M54.5 LOW BACK PAIN: ICD-10-CM

## 2020-08-17 PROCEDURE — 99213 OFFICE O/P EST LOW 20 MIN: CPT

## 2020-08-17 NOTE — HISTORY OF PRESENT ILLNESS
[FreeTextEntry1] : Mr. Delacruz, who is right-handed, presents today accompanied with a , reports of minimal back pain. No leg pain. He is here complaining of isolated left shoulder pain radiating to the deltoid region since April 2020. He reports the pain is like a pulling sensation. Denies neck pain, trauma, or falls. He has not participated in physical therapy nor has he been evaluated by orthopedic. Pain is aggravated when he put his arm over his head or put his arm behind his back. \par \par MRI of the lumbar spine w/o contrast done on 8/12/2020 showed L4-5 mild-moderate bilateral foraminal stenosis due to small bilateral foraminal disc protrusions and facet hypertrophy

## 2020-08-17 NOTE — PHYSICAL EXAM
[General Appearance - Alert] : alert [General Appearance - Well Nourished] : well nourished [General Appearance - In No Acute Distress] : in no acute distress [General Appearance - Well Developed] : well developed [General Appearance - Well-Appearing] : healthy appearing [Time] : oriented to time [Person] : oriented to person [Place] : oriented to place [Cranial Nerves Oculomotor (III)] : extraocular motion intact [Cranial Nerves Trigeminal (V)] : facial sensation intact symmetrically [Cranial Nerves Optic (II)] : visual acuity intact bilaterally,  pupils equal round and reactive to light [Cranial Nerves Glossopharyngeal (IX)] : tongue and palate midline [Cranial Nerves Accessory (XI - Cranial And Spinal)] : head turning and shoulder shrug symmetric [Cranial Nerves Facial (VII)] : face symmetrical [Cranial Nerves Hypoglossal (XII)] : there was no tongue deviation with protrusion [Motor Strength] : muscle strength was normal in all four extremities [Motor Tone] : muscle tone was normal in all four extremities [Abnormal Walk] : normal gait [Balance] : balance was intact [2+] : Patella left 2+

## 2020-08-24 ENCOUNTER — APPOINTMENT (OUTPATIENT)
Dept: INTERNAL MEDICINE | Facility: CLINIC | Age: 55
End: 2020-08-24
Payer: MEDICAID

## 2020-08-24 ENCOUNTER — OUTPATIENT (OUTPATIENT)
Dept: OUTPATIENT SERVICES | Facility: HOSPITAL | Age: 55
LOS: 1 days | Discharge: HOME | End: 2020-08-24

## 2020-08-24 VITALS
DIASTOLIC BLOOD PRESSURE: 79 MMHG | BODY MASS INDEX: 35.99 KG/M2 | SYSTOLIC BLOOD PRESSURE: 126 MMHG | WEIGHT: 243 LBS | HEART RATE: 99 BPM | HEIGHT: 69 IN | TEMPERATURE: 97.5 F

## 2020-08-24 DIAGNOSIS — M25.512 PAIN IN LEFT SHOULDER: ICD-10-CM

## 2020-08-24 PROCEDURE — 99214 OFFICE O/P EST MOD 30 MIN: CPT | Mod: GC

## 2020-08-24 RX ORDER — SITAGLIPTIN 100 MG/1
100 TABLET, FILM COATED ORAL DAILY
Qty: 30 | Refills: 5 | Status: DISCONTINUED | COMMUNITY
Start: 2019-12-17 | End: 2020-08-24

## 2020-08-24 RX ORDER — ALOGLIPTIN BENZOATE AND PIOGLITAZONE HYDROCHLORIDE 25; 30 MG/1; MG/1
25-30 TABLET, FILM COATED ORAL DAILY
Qty: 30 | Refills: 5 | Status: DISCONTINUED | COMMUNITY
Start: 2020-05-04 | End: 2020-08-24

## 2020-08-24 RX ORDER — CICLOPIROX 80 MG/ML
8 SOLUTION TOPICAL
Qty: 1 | Refills: 6 | Status: DISCONTINUED | COMMUNITY
Start: 2020-07-06 | End: 2020-08-24

## 2020-08-24 RX ORDER — MELOXICAM 7.5 MG/1
7.5 TABLET ORAL
Qty: 30 | Refills: 2 | Status: DISCONTINUED | COMMUNITY
Start: 2020-06-19 | End: 2020-08-24

## 2020-08-24 NOTE — PHYSICAL EXAM
[No Acute Distress] : no acute distress [Well Nourished] : well nourished [Normal Sclera/Conjunctiva] : normal sclera/conjunctiva [EOMI] : extraocular movements intact [PERRL] : pupils equal round and reactive to light [No Respiratory Distress] : no respiratory distress  [Normal Oropharynx] : the oropharynx was normal [Clear to Auscultation] : lungs were clear to auscultation bilaterally [Normal Rate] : normal rate  [Regular Rhythm] : with a regular rhythm [Normal S1, S2] : normal S1 and S2 [No Edema] : there was no peripheral edema [Soft] : abdomen soft [Non Tender] : non-tender [Non-distended] : non-distended [Normal Bowel Sounds] : normal bowel sounds [No Joint Swelling] : no joint swelling

## 2020-09-01 ENCOUNTER — RX RENEWAL (OUTPATIENT)
Age: 55
End: 2020-09-01

## 2020-09-02 ENCOUNTER — APPOINTMENT (OUTPATIENT)
Dept: ORTHOPEDIC SURGERY | Facility: CLINIC | Age: 55
End: 2020-09-02

## 2020-09-02 ENCOUNTER — OUTPATIENT (OUTPATIENT)
Dept: OUTPATIENT SERVICES | Facility: HOSPITAL | Age: 55
LOS: 1 days | Discharge: HOME | End: 2020-09-02

## 2020-09-08 PROBLEM — M25.512 SHOULDER PAIN, LEFT: Status: ACTIVE | Noted: 2020-08-17

## 2020-09-08 NOTE — REVIEW OF SYSTEMS
[Joint Pain] : joint pain [Back Pain] : back pain [Negative] : Neurological [Claudication] : no  leg claudication [FreeTextEntry9] : L shoulder

## 2020-09-08 NOTE — PLAN
[FreeTextEntry1] : # L shoulder pain\par - XRay in June revealed mild degenerative changes, no acute fractures or dislocation\par - Experiences relief with Celecoxib and Meloxicam, counseled on taking only Celecoxib and not in combination with Meloxicam\par - Will initiate Tizanidine \par - Has open Orthopedic referral, will order MRI w/w/o contrast of L shoulder\par - PT referral\par \par #Lower back pain\par - Reports improvement, pain well controlled\par - Was seen by [facility], obtained MRI which revealed degenerative changes, L4-5 bilateral foraminal stenosis; no interventions\par - PT referral\par \par #Health Maintenance\par - PPSV23 today\par \par RTC in 6 months.\par \par #DM\par - Well controlled, HbA1c 5.4% in June\par - C/w Metformin, Atorvastatin\par - D/c Alogliptin-Pioglitazone as DM is well controlled at this time\par - Eye and foot clinic referrals\par \par #HCC s/p embolization\par - No longer wishes for transplantation\par - Asymptomatic, denies any complaints\par

## 2020-09-08 NOTE — HISTORY OF PRESENT ILLNESS
[ Service] : provided by  Service [FreeTextEntry1] : L shoulder pain [FreeTextEntry2] : Priscilla [TWNoteComboBox1] : American Sign Language [de-identified] : 56 yo M with PMHx of DM, HCC, chronic lower back pain presents to the office with chief complaint of L shoulder pain. Pt states that L shoulder pain has been worsening in the past several months. It is most severe at night when pt is attempting to sleep, causing him to stay up very late at times due to discomfort. He had an XRay performed in June, revealed mild degenerative changes of L shoulder. Pt was recently seen by [facility] due to chronic lower back pain and L shoulder pain and was initiated on Celecoxib for L shoulder pain, states that it has been helping relieve his pain. He also has an open referral to orthopedic surgeon. Pt was referred to physical therapy as well, will ensure that he has referral information as he is interested in going and will likely benefit from PT.\par Pt denies any other complaints, states that he feels well otherwise and has all of his medications. He continues to smoke, although he states he has significantly cut down in the past several years. He smokes about half a pack per day and states that he is attempting to gradually cut back on his own.

## 2020-09-08 NOTE — ASSESSMENT
[FreeTextEntry1] : 56 yo M with PMHx of DM, HCC, chronic lower back pain presents to the office with chief complaint of L shoulder pain.

## 2020-12-02 ENCOUNTER — APPOINTMENT (OUTPATIENT)
Dept: ORTHOPEDIC SURGERY | Facility: CLINIC | Age: 55
End: 2020-12-02

## 2020-12-07 ENCOUNTER — APPOINTMENT (OUTPATIENT)
Dept: HEMATOLOGY ONCOLOGY | Facility: CLINIC | Age: 55
End: 2020-12-07

## 2020-12-14 ENCOUNTER — APPOINTMENT (OUTPATIENT)
Dept: HEMATOLOGY ONCOLOGY | Facility: CLINIC | Age: 55
End: 2020-12-14
Payer: MEDICAID

## 2020-12-14 ENCOUNTER — LABORATORY RESULT (OUTPATIENT)
Age: 55
End: 2020-12-14

## 2020-12-14 ENCOUNTER — OUTPATIENT (OUTPATIENT)
Dept: OUTPATIENT SERVICES | Facility: HOSPITAL | Age: 55
LOS: 1 days | Discharge: HOME | End: 2020-12-14

## 2020-12-14 VITALS
DIASTOLIC BLOOD PRESSURE: 78 MMHG | HEIGHT: 69 IN | HEART RATE: 85 BPM | BODY MASS INDEX: 34.07 KG/M2 | WEIGHT: 230 LBS | SYSTOLIC BLOOD PRESSURE: 139 MMHG | RESPIRATION RATE: 16 BRPM | TEMPERATURE: 98.1 F

## 2020-12-14 LAB
ALBUMIN SERPL ELPH-MCNC: 3.8 G/DL
ALP BLD-CCNC: 154 U/L
ALT SERPL-CCNC: 82 U/L
ANION GAP SERPL CALC-SCNC: 13 MMOL/L
AST SERPL-CCNC: 69 U/L
BILIRUB SERPL-MCNC: 1.5 MG/DL
BUN SERPL-MCNC: 11 MG/DL
CALCIUM SERPL-MCNC: 8.9 MG/DL
CHLORIDE SERPL-SCNC: 100 MMOL/L
CO2 SERPL-SCNC: 23 MMOL/L
CREAT SERPL-MCNC: 0.6 MG/DL
GLUCOSE SERPL-MCNC: 337 MG/DL
HCT VFR BLD CALC: 43.2 %
HGB BLD-MCNC: 15.5 G/DL
MCHC RBC-ENTMCNC: 33.3 PG
MCHC RBC-ENTMCNC: 35.9 G/DL
MCV RBC AUTO: 92.7 FL
PLATELET # BLD AUTO: 64 K/UL
PMV BLD: 10.6 FL
POTASSIUM SERPL-SCNC: 3.6 MMOL/L
PROT SERPL-MCNC: 7.6 G/DL
RBC # BLD: 4.66 M/UL
RBC # FLD: 13.3 %
SODIUM SERPL-SCNC: 136 MMOL/L
WBC # FLD AUTO: 4.95 K/UL

## 2020-12-14 PROCEDURE — 99213 OFFICE O/P EST LOW 20 MIN: CPT

## 2020-12-15 LAB — AFP-TM SERPL-MCNC: 2.1 NG/ML

## 2020-12-17 NOTE — ASSESSMENT
[FreeTextEntry1] : 55 yom with hepatocellular carcinoma secondary to hepatitis C-associated cirrhosis s/p TARE with Y90, which was complicated by dissection of vessels. He decided not to proceed with a liver transplant. Was lost to follow up for over 3 years.\par \par - AFP normal. CT chest/abdomen/pelvis demonstrated stable splenomegaly and cirrhotic liver with stable mass without internal enhancement, c/w prior Y90 treatment.\par - Continue aspirin 81 mg due to complications from TARE.\par -Will order repeat bloodwork and CT scans then.\par - Follow up in 4 months. \par \par #Thrombocytopenia is due to cirrhosis from hep C and splenic sequestration\par -he should follow up with GI for versicle surveillance  and hep C treatment at this point since he is doing well with his HCC if hep C was not eradicated in the past\par \par \par Cell:   or \par Ext [phone]  or [phone] to reach Matias via video phone\par \par \par

## 2020-12-17 NOTE — PHYSICAL EXAM
[Fully active, able to carry on all pre-disease performance without restriction] : Status 0 - Fully active, able to carry on all pre-disease performance without restriction [Normal] : affect appropriate [de-identified] : Hearing impaired. Uses . [de-identified] : obese

## 2020-12-17 NOTE — HISTORY OF PRESENT ILLNESS
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  He underwent TARE with Yttrium-90 on 10/06/2016.  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  He is currently being evaluated by [facility] for liver transplant.  He is pending an endoscopy and colonoscopy as part of his pretransplant evaluation.\par \par He was seen in the ED last month for RUQ pain.  He was prescribed oxycodone and is requesting a refill.  [de-identified] : Contact Information ([facility]):\par Rere Myers, Senior  - [phone]\par    Jah Ramirez - [phone]\par Kentrell Hernandez, Transplant Coordinator Assistant - [phone]\par Saad Ma, Transplant Coordinator - [phone]\par Josephine Abel,  - [phone] [de-identified] : 2/22/17\par He underwent a colonoscopy 01/2017 one hyperplastic polyp in rectum and tubular adenoma in sigmoid. fair prep in right colon but views are good.\[facility] will repeat colonoscopy in 3-5 years EGD- small varices. No stigmata. repeat 1-2 years. His mom has a massive stroke and son is now in group home. He missed his appointment in [facility] on 2/7/17. They cant reach him.  He feels well otherwise. I spoke with them and arranged follow up with Estefanía Corbett on March 2nd at 11:30 am. I gave him address and MAP of the area so he does not get lost. \par \par 6/29/20\par He is here for follow up. He was lost to follow up.  He was not showing up to [facility] apparently since he did not want surgery done and decided to come off the transplant list. His most recent blood work showed.  thrombocytopenia but lumps were seen, and CMP showed mild AST/ALT elevations from 6/2020.\par He is here with Yadira who is our sing  \par \par 8/3/2020\par Patient presents for follow up. He is accompanied by  Shanice. He complains of L shoulder pain, limiting his ability to move his left shoulder and making it difficult to sleep at night. He was previously seen by primary care physician regarding this and ordered for x-rays, which demonstrated degenerative changes. Testing from last visit revealed AFP of 1.8, CT chest without evidence of metastatic disease, and CT abdomen/pelvis only demonstrating unchanged splenomegaly and liver mass without internal enhancement (c/w prior Y90 treatment).\par \par \par 12/14/20\par He is doing well. Has no new complaints.  He was here with . Exam was limited due to COVID\par

## 2020-12-17 NOTE — CONSULT LETTER
[Dear  ___] : Dear  [unfilled], [Courtesy Letter:] : I had the pleasure of seeing your patient, [unfilled], in my office today. [Please see my note below.] : Please see my note below. [Consult Closing:] : Thank you very much for allowing me to participate in the care of this patient.  If you have any questions, please do not hesitate to contact me. [Sincerely,] : Sincerely, [FreeTextEntry3] : Mitchell

## 2020-12-17 NOTE — REASON FOR VISIT
[Follow-Up Visit] : a follow-up [FreeTextEntry2] : Yadira [TWNoteComboBox1] : American Sign Language

## 2021-01-05 DIAGNOSIS — Z85.05 PERSONAL HISTORY OF MALIGNANT NEOPLASM OF LIVER: ICD-10-CM

## 2021-01-12 ENCOUNTER — RESULT REVIEW (OUTPATIENT)
Age: 56
End: 2021-01-12

## 2021-01-12 ENCOUNTER — OUTPATIENT (OUTPATIENT)
Dept: OUTPATIENT SERVICES | Facility: HOSPITAL | Age: 56
LOS: 1 days | Discharge: HOME | End: 2021-01-12
Payer: MEDICAID

## 2021-01-12 DIAGNOSIS — C22.0 LIVER CELL CARCINOMA: ICD-10-CM

## 2021-01-12 PROCEDURE — 71260 CT THORAX DX C+: CPT | Mod: 26

## 2021-01-12 PROCEDURE — 74177 CT ABD & PELVIS W/CONTRAST: CPT | Mod: 26

## 2021-02-16 ENCOUNTER — OUTPATIENT (OUTPATIENT)
Dept: OUTPATIENT SERVICES | Facility: HOSPITAL | Age: 56
LOS: 1 days | Discharge: HOME | End: 2021-02-16

## 2021-02-16 ENCOUNTER — APPOINTMENT (OUTPATIENT)
Dept: INTERNAL MEDICINE | Facility: CLINIC | Age: 56
End: 2021-02-16
Payer: MEDICAID

## 2021-02-16 VITALS
OXYGEN SATURATION: 81 % | SYSTOLIC BLOOD PRESSURE: 139 MMHG | HEIGHT: 78 IN | BODY MASS INDEX: 25.82 KG/M2 | WEIGHT: 223.13 LBS | HEART RATE: 101 BPM | TEMPERATURE: 98 F | DIASTOLIC BLOOD PRESSURE: 82 MMHG

## 2021-02-16 DIAGNOSIS — Z86.79 PERSONAL HISTORY OF OTHER DISEASES OF THE CIRCULATORY SYSTEM: ICD-10-CM

## 2021-02-16 DIAGNOSIS — M54.16 RADICULOPATHY, LUMBAR REGION: ICD-10-CM

## 2021-02-16 PROCEDURE — 99213 OFFICE O/P EST LOW 20 MIN: CPT | Mod: GC

## 2021-02-16 NOTE — HEALTH RISK ASSESSMENT
[] : Yes [16-20] : 16-20 [Yes] : Yes [2 - 4 times a month (2 pts)] : 2-4 times a month (2 points) [0] : 1) Little interest or pleasure doing things: Not at all (0)

## 2021-02-16 NOTE — PHYSICAL EXAM
[Normal Sclera/Conjunctiva] : normal sclera/conjunctiva [No JVD] : no jugular venous distention [No Respiratory Distress] : no respiratory distress  [No Accessory Muscle Use] : no accessory muscle use [Normal Rate] : normal rate  [Regular Rhythm] : with a regular rhythm [Soft] : abdomen soft [Non Tender] : non-tender [Non-distended] : non-distended [Normal Bowel Sounds] : normal bowel sounds

## 2021-02-17 DIAGNOSIS — D49.0 NEOPLASM OF UNSPECIFIED BEHAVIOR OF DIGESTIVE SYSTEM: ICD-10-CM

## 2021-02-17 DIAGNOSIS — Z86.79 PERSONAL HISTORY OF OTHER DISEASES OF THE CIRCULATORY SYSTEM: ICD-10-CM

## 2021-02-17 DIAGNOSIS — Z85.05 PERSONAL HISTORY OF MALIGNANT NEOPLASM OF LIVER: ICD-10-CM

## 2021-02-17 DIAGNOSIS — M54.16 RADICULOPATHY, LUMBAR REGION: ICD-10-CM

## 2021-02-20 NOTE — ASSESSMENT
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 He underwent TARE with Yttrium-90 on 10/06/2016. 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 \par he was supposed to be get an evaluation for liver transplant at [facility], but he changed his mind and he is more on the list\par \par #DM\par - Well controlled, HbA1c 5.4% in June\par - C/w Metformin 1 tab a day\par - will refer him to podiatry \par - last ophtalmo appointment 6 months ago \par \par # DLD: \par - c/w atorvastatin\par - lipid profile next visit \par \par #HCC s/p embolization\par - No longer wishes for transplantation\par - Asymptomatic, denies any complaints\par - had splenomegaly + oesophageal varices on ct abdomen + ct chest done recently \par - will refer to GI for a repeat upper endoscopy \par - advised not to take NSAIDS for his shoulder pain \par - repeat US abdomen and alpha feotoprotein \par \par # HCM:\par - needs a repeat endoscopy and colonoscopy \par - will refer to GI clinics\par - got PPSV last year\par - flu shot last year\par - f/u in 3 months and PRN \par .

## 2021-02-20 NOTE — HISTORY OF PRESENT ILLNESS
[FreeTextEntry1] : follow up\par .  service provided by  Service. \par 's Name: helio merida \par Language: American Sign Language.  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 He underwent TARE with Yttrium-90 on 10/06/2016. 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 \par he was supposed to be get an evaluation for liver transplant at [facility], but he changed his mind and he is more on the list\par he denies any ascites, no n/v, no hematemesis, no weight loss, no diarrhea or constipation, no abdominal pain \par he has good appetite and no other cirrhotic signs on exam

## 2021-02-20 NOTE — REVIEW OF SYSTEMS
[Fever] : no fever [Chest Pain] : no chest pain [Chills] : no chills [Orthopena] : no orthopnea [Shortness Of Breath] : no shortness of breath [Abdominal Pain] : no abdominal pain [Constipation] : no constipation [Nausea] : no nausea [Diarrhea] : no diarrhea [Vomiting] : no vomiting

## 2021-04-02 ENCOUNTER — OUTPATIENT (OUTPATIENT)
Dept: OUTPATIENT SERVICES | Facility: HOSPITAL | Age: 56
LOS: 1 days | Discharge: HOME | End: 2021-04-02

## 2021-04-02 ENCOUNTER — APPOINTMENT (OUTPATIENT)
Dept: PODIATRY | Facility: CLINIC | Age: 56
End: 2021-04-02
Payer: MEDICAID

## 2021-04-02 DIAGNOSIS — E11.9 TYPE 2 DIABETES MELLITUS WITHOUT COMPLICATIONS: ICD-10-CM

## 2021-04-02 DIAGNOSIS — L85.1 ACQUIRED KERATOSIS [KERATODERMA] PALMARIS ET PLANTARIS: ICD-10-CM

## 2021-04-02 PROCEDURE — 99213 OFFICE O/P EST LOW 20 MIN: CPT

## 2021-04-05 NOTE — HISTORY OF PRESENT ILLNESS
[FreeTextEntry1] : 55 year old M  presents for a diabetic foot evaluation. Mr. DA SILVA relates  tingling burning in his feet. Last A1c was 5.4% 6/2020\par

## 2021-04-05 NOTE — ASSESSMENT
[FreeTextEntry1] : -Loss of protective sensation:   no\par -Presence of foot deformity:  no \par -presence of pre-ulcerative lesion: no\par   -hemorrhage into callus: no\par -atrophy of heel or metatarsal fat pads: no\par \par -Diabetic neurovascular foot assessment  performed. \par -Discussed with patient diabetic foot hygiene.Patient instructed to regularly check the bottom of the feet\par -Patient given a diabetic foot education sheet\par -Return 12month\par

## 2021-04-05 NOTE — REASON FOR VISIT
[Follow-Up Visit] : a follow-up visit for [[employer] ] : provided by [employer]   [FreeTextEntry1] : [id number] [TWNoteComboBox1] : American Sign Language

## 2021-04-05 NOTE — PHYSICAL EXAM
[General Appearance - Alert] : alert [General Appearance - In No Acute Distress] : in no acute distress [2+] : left foot dorsalis pedis 2+ [Oriented To Time, Place, And Person] : oriented to person, place, and time [Impaired Insight] : insight and judgment were intact [Foot Ulcer] : no foot ulcer [Skin Induration] : no skin induration [Diminished Throughout Right Foot] : normal sensation with monofilament testing throughout right foot [Diminished Throughout Left Foot] : normal sensation with monofilament testing throughout left foot

## 2021-04-09 ENCOUNTER — APPOINTMENT (OUTPATIENT)
Dept: GASTROENTEROLOGY | Facility: CLINIC | Age: 56
End: 2021-04-09

## 2021-04-10 LAB
25(OH)D3 SERPL-MCNC: 12 NG/ML
ALBUMIN SERPL ELPH-MCNC: 4 G/DL
ALP BLD-CCNC: 186 U/L
ALT SERPL-CCNC: 111 U/L
ANION GAP SERPL CALC-SCNC: 13 MMOL/L
AST SERPL-CCNC: 127 U/L
BASOPHILS # BLD AUTO: 0.03 K/UL
BASOPHILS NFR BLD AUTO: 0.7 %
BILIRUB SERPL-MCNC: 1.5 MG/DL
BUN SERPL-MCNC: 11 MG/DL
CALCIUM SERPL-MCNC: 9.2 MG/DL
CHLORIDE SERPL-SCNC: 100 MMOL/L
CHOLEST SERPL-MCNC: 162 MG/DL
CO2 SERPL-SCNC: 20 MMOL/L
CREAT SERPL-MCNC: 0.7 MG/DL
EOSINOPHIL # BLD AUTO: 0 K/UL
EOSINOPHIL NFR BLD AUTO: 0 %
ESTIMATED AVERAGE GLUCOSE: 229 MG/DL
GLUCOSE SERPL-MCNC: 514 MG/DL
HBA1C MFR BLD HPLC: 9.6 %
HCT VFR BLD CALC: 45.9 %
HDLC SERPL-MCNC: 46 MG/DL
HGB BLD-MCNC: 16.3 G/DL
IMM GRANULOCYTES NFR BLD AUTO: 0.2 %
LDLC SERPL CALC-MCNC: 105 MG/DL
LYMPHOCYTES # BLD AUTO: 1.06 K/UL
LYMPHOCYTES NFR BLD AUTO: 23.3 %
MAN DIFF?: NORMAL
MCHC RBC-ENTMCNC: 33.1 PG
MCHC RBC-ENTMCNC: 35.5 G/DL
MCV RBC AUTO: 93.1 FL
MONOCYTES # BLD AUTO: 0.37 K/UL
MONOCYTES NFR BLD AUTO: 8.1 %
NEUTROPHILS # BLD AUTO: 3.08 K/UL
NEUTROPHILS NFR BLD AUTO: 67.7 %
NONHDLC SERPL-MCNC: 116 MG/DL
PLATELET # BLD AUTO: 69 K/UL
POTASSIUM SERPL-SCNC: 4.4 MMOL/L
PROT SERPL-MCNC: 7.5 G/DL
RBC # BLD: 4.93 M/UL
RBC # FLD: 13.7 %
SODIUM SERPL-SCNC: 133 MMOL/L
TRIGL SERPL-MCNC: 137 MG/DL
TSH SERPL-ACNC: 2.26 UIU/ML
WBC # FLD AUTO: 4.55 K/UL

## 2021-04-12 ENCOUNTER — APPOINTMENT (OUTPATIENT)
Dept: HEPATOLOGY | Facility: CLINIC | Age: 56
End: 2021-04-12
Payer: MEDICAID

## 2021-04-12 ENCOUNTER — OUTPATIENT (OUTPATIENT)
Dept: OUTPATIENT SERVICES | Facility: HOSPITAL | Age: 56
LOS: 1 days | Discharge: HOME | End: 2021-04-12

## 2021-04-12 VITALS
DIASTOLIC BLOOD PRESSURE: 70 MMHG | TEMPERATURE: 97.6 F | HEART RATE: 89 BPM | SYSTOLIC BLOOD PRESSURE: 130 MMHG | WEIGHT: 215 LBS | BODY MASS INDEX: 24.88 KG/M2 | OXYGEN SATURATION: 99 % | HEIGHT: 78 IN

## 2021-04-12 PROCEDURE — 99204 OFFICE O/P NEW MOD 45 MIN: CPT

## 2021-04-12 NOTE — ASSESSMENT
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 on ASA, Chronic Hep C (no prior Rx received, No Hep vaccination received as per pt), newly diagnosed DM on metformin, GERD, was sent for the workup of HCC, chronic Hepatitis C?, transaminitis by PMD.\par \par \par # Transaminitis\par HCC / Hep C VS DILI (secondary to lipitor)\par continue holding lipitor\par repeat CMP\par \par \par # HCC s/p Y90 complicated by celiac artery dissection on ASA\par # Stable 4.5 cm liver mass since 2019 with no new lesions\par # small esophageal varices on EGD 2017\par get CLD workup\par Hep C PCR\par GET AFP, cmp\par pt will need repeat EGD\par \par \par # Hep C\par pt unaware of diagnosis, no prior Rx before\par will get hepatitis panel and Hep A antibodies\par \par \par # Colon polyps\par Sigmoid colon: tubular adenoma, fu colonoscopy next year\par Rectal polyp: hyperplastic polyp\par repeat  colonoscopy next year\par \par

## 2021-04-12 NOTE — PHYSICAL EXAM
[General Appearance - Alert] : alert [General Appearance - In No Acute Distress] : in no acute distress [General Appearance - Well Nourished] : well nourished [Sclera] : the sclera and conjunctiva were normal [Outer Ear] : the ears and nose were normal in appearance [Hearing Threshold Finger Rub Not [place]] : hearing was normal [Neck Appearance] : the appearance of the neck was normal [Jugular Venous Distention Increased] : there was no jugular-venous distention [Heart Rate And Rhythm] : heart rate was normal and rhythm regular [Heart Sounds] : normal S1 and S2 [Edema] : there was no peripheral edema [Abdomen Soft] : soft [Abdomen Tenderness] : non-tender [Abdomen Mass (___ Cm)] : no abdominal mass palpated [No Spinal Tenderness] : no spinal tenderness [Abnormal Walk] : normal gait [Musculoskeletal - Swelling] : no joint swelling seen [Skin Color & Pigmentation] : normal skin color and pigmentation [] : no rash [Cranial Nerves] : cranial nerves 2-12 were intact [Affect] : the affect was normal [Scleral Icterus] : No Scleral Icterus [Spider Angioma] : No spider angioma(s) were observed [Abdominal  Ascites] : no ascites [Asterixis] : no asterixis observed [Jaundice] : No jaundice [Palmar Erythema] : no Palmar Erythema [Depression] : no depression [Hallucinations] : ~T no ~M hallucinations

## 2021-04-12 NOTE — REVIEW OF SYSTEMS
[Fever] : no fever [Chills] : no chills [Feeling Poorly] : not feeling poorly [Feeling Tired] : not feeling tired [Eye Pain] : no eye pain [Red Eyes] : eyes not red [Earache] : no earache [Loss Of Hearing] : no hearing loss [Nosebleeds] : no nosebleeds [Chest Pain] : no chest pain [Palpitations] : no palpitations [Lower Ext Edema] : no extremity edema [Shortness Of Breath] : no shortness of breath [Wheezing] : no wheezing [Abdominal Pain] : no abdominal pain [Vomiting] : no vomiting [Constipation] : no constipation [Diarrhea] : no diarrhea [Dysuria] : no dysuria [Incontinence] : no incontinence [Arthralgias] : no arthralgias [Joint Pain] : no joint pain [Joint Swelling] : no joint swelling [Skin Wound] : no skin wound [Itching] : no itching [Confused] : no confusion [Dizziness] : no dizziness [Anxiety] : no anxiety [Muscle Weakness] : no muscle weakness [Easy Bleeding] : no tendency for easy bleeding

## 2021-04-12 NOTE — HISTORY OF PRESENT ILLNESS
[de-identified] : : Marladevyndakota 55 years old male known to have HCC s/p Y90 complicated by celiac artery dissection on ASA, Chronic Hep C (no prior Rx received, No Hep vaccination received as per pt), newly diagnosed DM on metformin, GERD, was sent for the workup of HCC, chronic Hepatitis C?, transaminitis by PMD.\par \par Pt was initially on the [facility] transplant list, but he took himself off the list as he had been doing ok after the Y90 procedure. \par Pt currently is doing okay. The pt denies any nausea, vomiting, abdominal pain, abdominal distension, diarrhea, constipation, change in the color of stool or urine, recent change in appetite or weight. Pt was recently taken off lipitor by the PMD for transaminitis.\par \par Past EGD: 2017 by Dr Velez: showed small esophageal varices, hypertensive portal gastropathy in the antrum and body of stomach, mild gastitis.\par \par Colonoscopy: 2017 by Dr Velez, single polyp in the sigmoid colon and rectum and mild diverticulosis. Patho showed tubular adenoma (sigmoid) and hyperplastic polyp (rectum)

## 2021-04-15 ENCOUNTER — OUTPATIENT (OUTPATIENT)
Dept: OUTPATIENT SERVICES | Facility: HOSPITAL | Age: 56
LOS: 1 days | Discharge: HOME | End: 2021-04-15
Payer: MEDICAID

## 2021-04-15 ENCOUNTER — APPOINTMENT (OUTPATIENT)
Dept: OPHTHALMOLOGY | Facility: CLINIC | Age: 56
End: 2021-04-15

## 2021-04-15 PROCEDURE — 92012 INTRM OPH EXAM EST PATIENT: CPT

## 2021-04-19 ENCOUNTER — APPOINTMENT (OUTPATIENT)
Dept: HEMATOLOGY ONCOLOGY | Facility: CLINIC | Age: 56
End: 2021-04-19
Payer: MEDICAID

## 2021-04-19 ENCOUNTER — OUTPATIENT (OUTPATIENT)
Dept: OUTPATIENT SERVICES | Facility: HOSPITAL | Age: 56
LOS: 1 days | Discharge: HOME | End: 2021-04-19

## 2021-04-19 VITALS
HEART RATE: 70 BPM | TEMPERATURE: 98.6 F | SYSTOLIC BLOOD PRESSURE: 146 MMHG | HEIGHT: 69 IN | BODY MASS INDEX: 32.88 KG/M2 | WEIGHT: 222 LBS | RESPIRATION RATE: 16 BRPM | DIASTOLIC BLOOD PRESSURE: 78 MMHG

## 2021-04-19 PROCEDURE — 99212 OFFICE O/P EST SF 10 MIN: CPT

## 2021-04-22 DIAGNOSIS — C22.0 LIVER CELL CARCINOMA: ICD-10-CM

## 2021-04-22 NOTE — END OF VISIT
[] : Fellow [FreeTextEntry3] : HE is doing well. He has AFP pending with GI. Review his most recent blood work with Dr. Zepeda. HE will RTC in July and will recheck CT than

## 2021-04-22 NOTE — PHYSICAL EXAM
[Fully active, able to carry on all pre-disease performance without restriction] : Status 0 - Fully active, able to carry on all pre-disease performance without restriction [Normal] : affect appropriate [de-identified] : Hearing impaired. Uses . [de-identified] : obese

## 2021-04-22 NOTE — ASSESSMENT
[FreeTextEntry1] : 55 yom with hepatocellular carcinoma secondary to hepatitis C-associated cirrhosis s/p TARE with Y90, which was complicated by dissection of vessels. He decided not to proceed with a liver transplant. Was lost to follow up for over 3 years.\par \par - AFP normal on 12/2020. CT chest/abdomen/pelvis (1/2021) demonstrated stable splenomegaly and cirrhotic liver with stable mass without internal enhancement, c/w prior Y90 treatment.\par - He has been off aspirin for a while , he used to take it after he had some complications from TARE. \par - He has some blood work including AFP, ordered by GI , scheduled in May. \par - He will return in July 2021, we will order  CT scans then.\par \par \par # Thrombocytopenia is due to cirrhosis from hep C and splenic sequestration\par - He is following with GI for  surveillance  and hep C treatment at this point since he is doing well with his HCC if hep C was not eradicated in the past.\par - Colonoscopy scheduled on 5/6/2021\par \par \par The patient was seen and examined by Dr Worthington who agreed with the above plan\par \par \par

## 2021-04-22 NOTE — HISTORY OF PRESENT ILLNESS
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  He underwent TARE with Yttrium-90 on 10/06/2016.  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  He is currently being evaluated by [facility] for liver transplant.  He is pending an endoscopy and colonoscopy as part of his pretransplant evaluation.\par \par He was seen in the ED last month for RUQ pain.  He was prescribed oxycodone and is requesting a refill.  [de-identified] : Contact Information ([facility]):\par Rere Myers, Senior  - [phone]\par    Jah Ramirez - [phone]\par Kentrell Hernandez, Transplant Coordinator Assistant - [phone]\par Saad Ma, Transplant Coordinator - [phone]\par Josephine Abel,  - [phone] [de-identified] : 2/22/17\par He underwent a colonoscopy 01/2017 one hyperplastic polyp in rectum and tubular adenoma in sigmoid. fair prep in right colon but views are good.\[facility] will repeat colonoscopy in 3-5 years EGD- small varices. No stigmata. repeat 1-2 years. His mom has a massive stroke and son is now in [facility]. He missed his appointment in [facility] on 2/7/17. They cant reach him.  He feels well otherwise. I spoke with them and arranged follow up with Estefanía Corbett on March 2nd at 11:30 am. I gave him address and MAP of the area so he does not get lost. \par \par 6/29/20\par He is here for follow up. He was lost to follow up.  He was not showing up to [facility] apparently since he did not want surgery done and decided to come off the transplant list. His most recent blood work showed.  thrombocytopenia but lumps were seen, and CMP showed mild AST/ALT elevations from 6/2020.\par He is here with Yadira who is our sing  \par \par 8/3/2020\par Patient presents for follow up. He is accompanied by  Shanice. He complains of L shoulder pain, limiting his ability to move his left shoulder and making it difficult to sleep at night. He was previously seen by primary care physician regarding this and ordered for x-rays, which demonstrated degenerative changes. Testing from last visit revealed AFP of 1.8, CT chest without evidence of metastatic disease, and CT abdomen/pelvis only demonstrating unchanged splenomegaly and liver mass without internal enhancement (c/w prior Y90 treatment).\par \par \par 12/14/20\par He is doing well. Has no new complaints.  He was here with . Exam was limited due to COVID\par \par 4/19/2021: The patient is here for follow up. He has no complaints , he is doing well. He was seen by GI mid April , blood work ordered for mid May . He is scheduled for colonoscopy in few weeks. Last AFP was 2.1 on 12/2020, and CT scans were negative for new findings ( 1/2021)\par

## 2021-04-26 DIAGNOSIS — C22.0 LIVER CELL CARCINOMA: ICD-10-CM

## 2021-04-26 DIAGNOSIS — B19.20 UNSPECIFIED VIRAL HEPATITIS C WITHOUT HEPATIC COMA: ICD-10-CM

## 2021-05-17 LAB
INR PPP: 1.12 RATIO
PT BLD: 12.9 SEC

## 2021-05-18 LAB
AFP-TM SERPL-MCNC: <1.8 NG/ML
ALBUMIN SERPL ELPH-MCNC: 3.8 G/DL
ALP BLD-CCNC: 182 U/L
ALT SERPL-CCNC: 101 U/L
ANION GAP SERPL CALC-SCNC: 13 MMOL/L
AST SERPL-CCNC: 88 U/L
BILIRUB SERPL-MCNC: 1.4 MG/DL
BUN SERPL-MCNC: 10 MG/DL
CALCIUM SERPL-MCNC: 9.1 MG/DL
CERULOPLASMIN SERPL-MCNC: 27 MG/DL
CHLORIDE SERPL-SCNC: 104 MMOL/L
CMV IGG SERPL QL: >10 U/ML
CMV IGG SERPL-IMP: POSITIVE
CMV IGM SERPL QL: <8 AU/ML
CMV IGM SERPL QL: NEGATIVE
CO2 SERPL-SCNC: 21 MMOL/L
CREAT SERPL-MCNC: 0.52 MG/DL
FERRITIN SERPL-MCNC: 145 NG/ML
GLUCOSE SERPL-MCNC: 295 MG/DL
HAV IGM SER QL: NONREACTIVE
HEPATITIS A IGG ANTIBODY: NONREACTIVE
HSV 1+2 IGG SER IA-IMP: POSITIVE
HSV1 IGG SER QL: 54.9 INDEX
MITOCHONDRIA AB SER IF-ACNC: NORMAL
POTASSIUM SERPL-SCNC: 4.1 MMOL/L
PROT SERPL-MCNC: 7.2 G/DL
SMOOTH MUSCLE AB SER QL IF: ABNORMAL
SODIUM SERPL-SCNC: 138 MMOL/L

## 2021-05-20 LAB
COPPER SERPL-MCNC: 117 UG/DL
HCV RNA SERPL NAA DL=5-ACNC: 103 IU/ML
HCV RNA SERPL NAA+PROBE-LOG IU: 2.01 LOG10IU/ML
SOLUBLE LIVER IGG SER IA-ACNC: 1.6

## 2021-05-21 LAB
ANA PAT FLD IF-IMP: ABNORMAL
ANA SER IF-ACNC: ABNORMAL

## 2021-05-24 ENCOUNTER — APPOINTMENT (OUTPATIENT)
Dept: HEPATOLOGY | Facility: CLINIC | Age: 56
End: 2021-05-24

## 2021-05-31 ENCOUNTER — LABORATORY RESULT (OUTPATIENT)
Age: 56
End: 2021-05-31

## 2021-05-31 ENCOUNTER — OUTPATIENT (OUTPATIENT)
Dept: OUTPATIENT SERVICES | Facility: HOSPITAL | Age: 56
LOS: 1 days | Discharge: HOME | End: 2021-05-31

## 2021-05-31 DIAGNOSIS — Z11.59 ENCOUNTER FOR SCREENING FOR OTHER VIRAL DISEASES: ICD-10-CM

## 2021-06-03 ENCOUNTER — OUTPATIENT (OUTPATIENT)
Dept: OUTPATIENT SERVICES | Facility: HOSPITAL | Age: 56
LOS: 1 days | Discharge: HOME | End: 2021-06-03
Payer: MEDICAID

## 2021-06-03 ENCOUNTER — TRANSCRIPTION ENCOUNTER (OUTPATIENT)
Age: 56
End: 2021-06-03

## 2021-06-03 VITALS
HEART RATE: 82 BPM | OXYGEN SATURATION: 100 % | SYSTOLIC BLOOD PRESSURE: 145 MMHG | RESPIRATION RATE: 18 BRPM | DIASTOLIC BLOOD PRESSURE: 68 MMHG

## 2021-06-03 VITALS
TEMPERATURE: 98 F | SYSTOLIC BLOOD PRESSURE: 130 MMHG | WEIGHT: 214.95 LBS | HEART RATE: 86 BPM | HEIGHT: 69 IN | RESPIRATION RATE: 18 BRPM | DIASTOLIC BLOOD PRESSURE: 82 MMHG

## 2021-06-03 PROCEDURE — 43235 EGD DIAGNOSTIC BRUSH WASH: CPT

## 2021-06-03 NOTE — CHART NOTE - NSCHARTNOTEFT_GEN_A_CORE
PACU ANESTHESIA ADMISSION NOTE      Procedure: EGD  Post op diagnosis:  Varisis    ____  Intubated  TV:______       Rate: ______      FiO2: ______    _x___  Patent Airway    _x___  Full return of protective reflexes    _x___  Full recovery from anesthesia / back to baseline status    Vitals:            T: 98               BP :  127/69              R:  16             Sat:  98%             P: 89      Mental Status:  _x___ Awake   ___x__ Alert   _____ Drowsy   _____ Sedated    Nausea/Vomiting:  _x___  NO       ______Yes,   See Post - Op Orders         Pain Scale (0-10):  __0___    Treatment: _x___ None    ____ See Post - Op/PCA Orders    Post - Operative Fluids:   __x__ Oral   ____ See Post - Op Orders    Plan: Discharge:   _x___Home       _____Floor     _____Critical Care    _____  Other:_________________    Comments:  No anesthesia issues or complications noted.  Discharge when criteria met.

## 2021-06-03 NOTE — H&P PST ADULT - ASSESSMENT
55 male with Hep C / liver cirrhosis, HCC  is here for EGD for Varices screening   Plan   Proceed to EGD

## 2021-06-11 DIAGNOSIS — Z85.05 PERSONAL HISTORY OF MALIGNANT NEOPLASM OF LIVER: ICD-10-CM

## 2021-06-11 DIAGNOSIS — I85.00 ESOPHAGEAL VARICES WITHOUT BLEEDING: ICD-10-CM

## 2021-06-11 DIAGNOSIS — E11.9 TYPE 2 DIABETES MELLITUS WITHOUT COMPLICATIONS: ICD-10-CM

## 2021-06-11 DIAGNOSIS — K31.89 OTHER DISEASES OF STOMACH AND DUODENUM: ICD-10-CM

## 2021-06-11 DIAGNOSIS — Z79.84 LONG TERM (CURRENT) USE OF ORAL HYPOGLYCEMIC DRUGS: ICD-10-CM

## 2021-06-11 DIAGNOSIS — Z13.810 ENCOUNTER FOR SCREENING FOR UPPER GASTROINTESTINAL DISORDER: ICD-10-CM

## 2021-06-23 RX ORDER — PROPRANOLOL HCL 160 MG
1 CAPSULE, EXTENDED RELEASE 24HR ORAL
Qty: 60 | Refills: 2
Start: 2021-06-23 | End: 2021-09-20

## 2021-07-21 ENCOUNTER — NON-APPOINTMENT (OUTPATIENT)
Age: 56
End: 2021-07-21

## 2021-07-22 LAB
25(OH)D3 SERPL-MCNC: 11 NG/ML
ALBUMIN SERPL ELPH-MCNC: 3.6 G/DL
ALP BLD-CCNC: 202 U/L
ALT SERPL-CCNC: 106 U/L
ANION GAP SERPL CALC-SCNC: 19 MMOL/L
AST SERPL-CCNC: 100 U/L
BASOPHILS # BLD AUTO: 0.04 K/UL
BASOPHILS NFR BLD AUTO: 0.8 %
BILIRUB SERPL-MCNC: 0.8 MG/DL
BUN SERPL-MCNC: 9 MG/DL
CALCIUM SERPL-MCNC: 9.2 MG/DL
CHLORIDE SERPL-SCNC: 100 MMOL/L
CHOLEST SERPL-MCNC: 171 MG/DL
CO2 SERPL-SCNC: 18 MMOL/L
CREAT SERPL-MCNC: 0.7 MG/DL
EOSINOPHIL # BLD AUTO: 0 K/UL
EOSINOPHIL NFR BLD AUTO: 0 %
ESTIMATED AVERAGE GLUCOSE: 237 MG/DL
GLUCOSE SERPL-MCNC: 549 MG/DL
HBA1C MFR BLD HPLC: 9.9 %
HCT VFR BLD CALC: 45.1 %
HDLC SERPL-MCNC: 40 MG/DL
HGB BLD-MCNC: 16.2 G/DL
IMM GRANULOCYTES NFR BLD AUTO: 0.6 %
LDLC SERPL CALC-MCNC: 110 MG/DL
LYMPHOCYTES # BLD AUTO: 1.39 K/UL
LYMPHOCYTES NFR BLD AUTO: 28.3 %
MAN DIFF?: NORMAL
MCHC RBC-ENTMCNC: 34.2 PG
MCHC RBC-ENTMCNC: 35.9 G/DL
MCV RBC AUTO: 95.1 FL
MONOCYTES # BLD AUTO: 0.53 K/UL
MONOCYTES NFR BLD AUTO: 10.8 %
NEUTROPHILS # BLD AUTO: 2.92 K/UL
NEUTROPHILS NFR BLD AUTO: 59.5 %
NONHDLC SERPL-MCNC: 131 MG/DL
PLATELET # BLD AUTO: 79 K/UL
POTASSIUM SERPL-SCNC: 4 MMOL/L
PROT SERPL-MCNC: 7.2 G/DL
RBC # BLD: 4.74 M/UL
RBC # FLD: 13.7 %
SODIUM SERPL-SCNC: 137 MMOL/L
TRIGL SERPL-MCNC: 172 MG/DL
TSH SERPL-ACNC: 3.56 UIU/ML
WBC # FLD AUTO: 4.91 K/UL

## 2021-07-26 ENCOUNTER — APPOINTMENT (OUTPATIENT)
Dept: HEMATOLOGY ONCOLOGY | Facility: CLINIC | Age: 56
End: 2021-07-26

## 2021-08-19 ENCOUNTER — OUTPATIENT (OUTPATIENT)
Dept: OUTPATIENT SERVICES | Facility: HOSPITAL | Age: 56
LOS: 1 days | Discharge: HOME | End: 2021-08-19

## 2021-08-19 ENCOUNTER — APPOINTMENT (OUTPATIENT)
Dept: INTERNAL MEDICINE | Facility: CLINIC | Age: 56
End: 2021-08-19
Payer: MEDICAID

## 2021-08-19 VITALS
HEIGHT: 69 IN | SYSTOLIC BLOOD PRESSURE: 137 MMHG | HEART RATE: 76 BPM | DIASTOLIC BLOOD PRESSURE: 83 MMHG | WEIGHT: 213 LBS | BODY MASS INDEX: 31.55 KG/M2 | OXYGEN SATURATION: 98 %

## 2021-08-19 DIAGNOSIS — L91.8 OTHER HYPERTROPHIC DISORDERS OF THE SKIN: ICD-10-CM

## 2021-08-19 DIAGNOSIS — Z12.2 ENCOUNTER FOR SCREENING FOR MALIGNANT NEOPLASM OF RESPIRATORY ORGANS: ICD-10-CM

## 2021-08-19 PROCEDURE — 99214 OFFICE O/P EST MOD 30 MIN: CPT | Mod: GC

## 2021-08-19 RX ORDER — ATORVASTATIN CALCIUM 10 MG/1
10 TABLET, FILM COATED ORAL
Qty: 30 | Refills: 2 | Status: DISCONTINUED | COMMUNITY
Start: 2020-06-19 | End: 2021-08-19

## 2021-08-19 RX ORDER — CELECOXIB 200 MG/1
200 CAPSULE ORAL TWICE DAILY
Qty: 60 | Refills: 1 | Status: DISCONTINUED | COMMUNITY
Start: 2020-08-17 | End: 2021-08-19

## 2021-08-24 DIAGNOSIS — C22.0 LIVER CELL CARCINOMA: ICD-10-CM

## 2021-08-24 DIAGNOSIS — Z00.00 ENCOUNTER FOR GENERAL ADULT MEDICAL EXAMINATION WITHOUT ABNORMAL FINDINGS: ICD-10-CM

## 2021-08-24 DIAGNOSIS — B19.20 UNSPECIFIED VIRAL HEPATITIS C WITHOUT HEPATIC COMA: ICD-10-CM

## 2021-08-24 DIAGNOSIS — K74.60 UNSPECIFIED CIRRHOSIS OF LIVER: ICD-10-CM

## 2021-08-24 DIAGNOSIS — E11.9 TYPE 2 DIABETES MELLITUS WITHOUT COMPLICATIONS: ICD-10-CM

## 2021-08-24 DIAGNOSIS — L91.8 OTHER HYPERTROPHIC DISORDERS OF THE SKIN: ICD-10-CM

## 2021-08-24 DIAGNOSIS — I10 ESSENTIAL (PRIMARY) HYPERTENSION: ICD-10-CM

## 2021-08-26 ENCOUNTER — APPOINTMENT (OUTPATIENT)
Dept: INTERNAL MEDICINE | Facility: CLINIC | Age: 56
End: 2021-08-26

## 2021-08-26 VITALS — HEART RATE: 69 BPM | SYSTOLIC BLOOD PRESSURE: 140 MMHG | OXYGEN SATURATION: 96 % | DIASTOLIC BLOOD PRESSURE: 79 MMHG

## 2021-08-26 RX ORDER — TIZANIDINE 4 MG/1
4 TABLET ORAL
Qty: 20 | Refills: 0 | Status: COMPLETED | COMMUNITY
Start: 2020-09-03 | End: 2021-08-26

## 2021-08-26 RX ORDER — LIDOCAINE 5% 700 MG/1
5 PATCH TOPICAL
Qty: 30 | Refills: 0 | Status: COMPLETED | COMMUNITY
Start: 2021-02-16 | End: 2021-08-26

## 2021-09-10 NOTE — PHYSICAL EXAM
[No Acute Distress] : no acute distress [Well Nourished] : well nourished [Well Developed] : well developed [Normal Sclera/Conjunctiva] : normal sclera/conjunctiva [No JVD] : no jugular venous distention [Supple] : supple [No Respiratory Distress] : no respiratory distress  [Clear to Auscultation] : lungs were clear to auscultation bilaterally [Normal Rate] : normal rate  [Regular Rhythm] : with a regular rhythm [Normal S1, S2] : normal S1 and S2 [Soft] : abdomen soft [Non Tender] : non-tender [Non-distended] : non-distended [No HSM] : no HSM [Coordination Grossly Intact] : coordination grossly intact [No Focal Deficits] : no focal deficits [Normal Gait] : normal gait [Normal] : normal gait, coordination grossly intact, no focal deficits and deep tendon reflexes were 2+ and symmetric [de-identified] : No liver mass palpated ( although he has a 4.5cm liver mass) [de-identified] : Skin tags on the neck

## 2021-09-10 NOTE — HISTORY OF PRESENT ILLNESS
[FreeTextEntry1] : Follow up visit. \par The patient was here accompanied by the  Ibeth Noble [de-identified] : 56-year-old, Male patient with PMHx of Type II DM, HCC secondary to hepatitis C-induced liver cirrhosis. He underwent TARE with Yttrium-90 on 10/06/2016. 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 \par he was supposed to be get an evaluation for liver transplant at [place], but he changed his mind and he is more on the list.

## 2021-09-10 NOTE — ASSESSMENT
[FreeTextEntry1] : 56-year-old, Male patient with PMHx of Type II DM, HCC secondary to hepatitis C-induced liver cirrhosis. He underwent TARE with Yttrium-90 on 10/06/2016. 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 \par he was supposed to be get an evaluation for liver transplant at [place], but he changed his mind and he is more on the list. \par \par #Transaminitis\par #Liver Cirrhosis\par #HCC\par - Liver Cirrhosis likely secondary to HCV infection\par - HCV titers: \par - Other work up for CLD is negative except for: ESTELLE (1:640) and Antismooth -muscle antibodies ( 1:20)\par - Hepatitis IgM and IgG titers are negative, the patient refuses to take Hep A or Hep B vaccine  ( No evidence of immunization)\par - Stable Liver Cirrhosis ( No Ascites/SBP, No esophageal variceal bleeding, no hepatic encephalopathy)\par - EGD in 2021 shows: \par a. Varices in the lower third of the esophagus.  \par b. Erythema in the stomach compatible with non-erosive gastritis.  \par c. Normal mucosa in the whole examined duodenum.  \par - Increase Propranolol 10mg po q8hrs ( HR: 76, BP: 137/83)\par - Patient with Cirrhosis, keep MAP > 85mmHg \par - Hepatology follow up to discuss the need to treat HCV\par \par #HCC: \par - S/p TARE with Yttrium-90 on 10/06/2016. 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 \par - Repeat CT scan Abdomen/pelvis done on 2021:\par a. Stable approximate 4.5 hepatic mass (segment 5 and 8) without enhancement. No new lesions. \par b. Stable Cirrhosis and splenomegaly/varices.\par - Patient with Cirrhosis, keep MAP > 85mmHg  \par - Follow up with HemOnc ( Dr. Estrella). \par - The patient does not want to be on the liver transplant list anymore\par \par #Type II DM: \par - HbA1c: 9.9% \par - Patient currently on Metformin only\par - Will likely need to add 2 medications ( eg: Ozempic and Jardiance) vs start on insulin, will discuss with Dr. Helton.\par - Podiatry follow up: Last seen in 2021, ok. Will need to follow up in 12 months ( 2022)\par - BP: 137/82mmHg\par - Renal function: 0.7 and K+:4.0 --> Can start Lisinopril 5mg po once daily. - Patient with Cirrhosis, keep MAP > 85mmHg.\par - Repeat HbA1C in 3 months with urine microalbumin\par - Atorvastatin 10mg po once daily, keep stopped secondary to Transaminitis as per GI recommendations\par - Diabetes education referral given. \par \par #Dyslipidemia: \par - T Total Chol: 171 HDL: 40 LDL: 110\par - ASCVD score: 21.52%\par - Atorvastatin 10mg po once daily, keep stopped secondary to Transaminitis as per GI recommendations\par \par #Smoker: \par - Active smoker. \par - 1/2 to 1/3 pack per day. Smoked since the age of 17 years ( 41 years now)\par - Discussed with the patient the importance of smoking cessation. \par - Start Chantix\par \par #Skin Tags vs Papillomas\par - Dermatology referral\par \par #HCM: \par - Low dose CT scan of the chest for Lung cancer screening to be given upon next visit ( had CT chest with IV contrast for HCC follow up, so no lung masses seen)\par - Colonoscopy: Done in . S/p polypectomy. Repeat in .\par - Repeat EGD in 2 years ( )\par - The patient has Cirrhosis, took his Pneumovax, refusing Hep A, Hep B, Tdap and COVID-19 vaccination explained the importance of receiving these vaccines\par - Follow up in 3 months

## 2021-09-23 ENCOUNTER — APPOINTMENT (OUTPATIENT)
Dept: INTERNAL MEDICINE | Facility: CLINIC | Age: 56
End: 2021-09-23

## 2021-11-18 ENCOUNTER — NON-APPOINTMENT (OUTPATIENT)
Age: 56
End: 2021-11-18

## 2021-11-18 ENCOUNTER — APPOINTMENT (OUTPATIENT)
Dept: INTERNAL MEDICINE | Facility: CLINIC | Age: 56
End: 2021-11-18
Payer: MEDICAID

## 2021-11-18 ENCOUNTER — OUTPATIENT (OUTPATIENT)
Dept: OUTPATIENT SERVICES | Facility: HOSPITAL | Age: 56
LOS: 1 days | Discharge: HOME | End: 2021-11-18

## 2021-11-18 VITALS
OXYGEN SATURATION: 98 % | WEIGHT: 213 LBS | SYSTOLIC BLOOD PRESSURE: 120 MMHG | TEMPERATURE: 97.2 F | HEART RATE: 74 BPM | HEIGHT: 69 IN | BODY MASS INDEX: 31.55 KG/M2 | DIASTOLIC BLOOD PRESSURE: 78 MMHG

## 2021-11-18 DIAGNOSIS — Z23 ENCOUNTER FOR IMMUNIZATION: ICD-10-CM

## 2021-11-18 PROCEDURE — 99214 OFFICE O/P EST MOD 30 MIN: CPT | Mod: GC

## 2021-11-18 NOTE — PHYSICAL EXAM
[Normal] : no acute distress, well nourished, well developed and well-appearing [PERRL] : pupils equal round and reactive to light [EOMI] : extraocular movements intact [No Respiratory Distress] : no respiratory distress  [No Accessory Muscle Use] : no accessory muscle use [Clear to Auscultation] : lungs were clear to auscultation bilaterally [Normal Rate] : normal rate  [Regular Rhythm] : with a regular rhythm [Normal S1, S2] : normal S1 and S2 [No Edema] : there was no peripheral edema [Soft] : abdomen soft [Non Tender] : non-tender

## 2021-11-23 NOTE — HISTORY OF PRESENT ILLNESS
[de-identified] : 56-year-old, Male patient with PMHx of Type II DM, HCC secondary to hepatitis C-induced liver cirrhosis s/p TaRE in 2016. Pt accompanied by sign language intrepreter. \par Pt here for 3 month f/u, last seen in August. Started on Jardiance and ozempic in august, did not get labs for this visit which was point of visit. \par Otherwise no new complaints. \par Pt wants flu shot today. Did not get covid vaccine and does not want it. Willing to get Hep a and b vaccine now. \par Pt missed his appointment with hepatology, needs to make appointment.

## 2021-12-17 ENCOUNTER — APPOINTMENT (OUTPATIENT)
Dept: INTERNAL MEDICINE | Facility: CLINIC | Age: 56
End: 2021-12-17

## 2022-01-31 ENCOUNTER — OUTPATIENT (OUTPATIENT)
Dept: OUTPATIENT SERVICES | Facility: HOSPITAL | Age: 57
LOS: 1 days | Discharge: HOME | End: 2022-01-31

## 2022-01-31 ENCOUNTER — NON-APPOINTMENT (OUTPATIENT)
Age: 57
End: 2022-01-31

## 2022-01-31 ENCOUNTER — APPOINTMENT (OUTPATIENT)
Dept: HEPATOLOGY | Facility: CLINIC | Age: 57
End: 2022-01-31
Payer: MEDICAID

## 2022-01-31 VITALS — HEIGHT: 69 IN | TEMPERATURE: 97.6 F

## 2022-01-31 VITALS
TEMPERATURE: 97.6 F | BODY MASS INDEX: 30.21 KG/M2 | OXYGEN SATURATION: 99 % | HEIGHT: 69 IN | HEART RATE: 80 BPM | WEIGHT: 204 LBS | DIASTOLIC BLOOD PRESSURE: 70 MMHG | SYSTOLIC BLOOD PRESSURE: 120 MMHG

## 2022-01-31 PROCEDURE — 99215 OFFICE O/P EST HI 40 MIN: CPT | Mod: GC

## 2022-01-31 NOTE — END OF VISIT
[] : Resident [FreeTextEntry3] : 56 year old male of [place] origin with speech impairment HCV treatment naive,  compensated cirrhosis  EV HCC with TARE complicated by celiac artery dissection in 2016 seen in follow up.\par He has no symptoms of decompensation. \par Abdomen soft non tender \par EGD 2021 showed small varices and started on NSB. Will increase propranolol to 20 mg BID.  No need for repeat EGD.\par No ascites on exam and last imaging.\par HCC screening. Hx of HCC with TACE in 2016. Will get liver protocol MRI  and AFP. No need for CT chest as > 5 years. \par Will get work up for hepatitis C treatment and plan to treat.\par Prior Hep B exposure. Will need vaccination for hepatitis A but not available in office today\par Will need repeat colonoscopy arranged on follow up.

## 2022-01-31 NOTE — PHYSICAL EXAM
[General Appearance - Alert] : alert [General Appearance - In No Acute Distress] : in no acute distress [General Appearance - Well Nourished] : well nourished [Sclera] : the sclera and conjunctiva were normal [Outer Ear] : the ears and nose were normal in appearance [Hearing Threshold Finger Rub Not [place]] : hearing was normal [Neck Appearance] : the appearance of the neck was normal [Jugular Venous Distention Increased] : there was no jugular-venous distention [Heart Rate And Rhythm] : heart rate was normal and rhythm regular [Heart Sounds] : normal S1 and S2 [Edema] : there was no peripheral edema [Abdomen Soft] : soft [Abdomen Tenderness] : non-tender [Abdomen Mass (___ Cm)] : no abdominal mass palpated [No Spinal Tenderness] : no spinal tenderness [Abnormal Walk] : normal gait [Musculoskeletal - Swelling] : no joint swelling seen [Skin Color & Pigmentation] : normal skin color and pigmentation [] : no rash [Cranial Nerves] : cranial nerves 2-12 were intact [Affect] : the affect was normal [Scleral Icterus] : No Scleral Icterus [Spider Angioma] : No spider angioma(s) were observed [Abdominal  Ascites] : no ascites [Asterixis] : no asterixis observed [Jaundice] : No jaundice [Palmar Erythema] : no Palmar Erythema [Depression] : no depression [Hallucinations] : ~T no ~M hallucinations

## 2022-01-31 NOTE — REVIEW OF SYSTEMS
[Fever] : no fever [Chills] : no chills [Feeling Poorly] : not feeling poorly [Feeling Tired] : not feeling tired [Eye Pain] : no eye pain [Red Eyes] : eyes not red [Earache] : no earache [Loss Of Hearing] : no hearing loss [Nosebleeds] : no nosebleeds [Chest Pain] : no chest pain [Palpitations] : no palpitations [Lower Ext Edema] : no extremity edema [Shortness Of Breath] : no shortness of breath [Wheezing] : no wheezing [Abdominal Pain] : no abdominal pain [Vomiting] : no vomiting [Constipation] : no constipation [Diarrhea] : no diarrhea [Dysuria] : no dysuria [Incontinence] : no incontinence [Arthralgias] : no arthralgias [Joint Swelling] : no joint swelling [Skin Wound] : no skin wound [Itching] : no itching [Confused] : no confusion [Dizziness] : no dizziness [Anxiety] : no anxiety [Muscle Weakness] : no muscle weakness [Easy Bleeding] : no tendency for easy bleeding

## 2022-01-31 NOTE — HISTORY OF PRESENT ILLNESS
[de-identified] : : Marladevyndakota 56 years old male known to have HCC s/p Y90 complicated by celiac artery dissection on ASA, Chronic Hep C (no prior Rx received, No Hep vaccination received as per pt), newly diagnosed DM on metformin, GERD, was sent for the workup of HCC, chronic Hepatitis C?, transaminitis by PMD.\par \par Pt was initially on the [place] [facility] transplant list 3-4 years ago, but he took himself off the list as he had been doing ok after the Y90 procedure. Additionally patient has been drinking EtOH 2/3 Units every few days.\par \par Pt states he is doing ok. The pt denies any nausea, vomiting, abdominal pain, abdominal distension, diarrhea, constipation, change in the color of stool or urine, recent change in appetite or weight. Pt was recently taken off lipitor by the PMD for transaminitis.\par \par Past EGD: 2017 by Dr Velez: showed small esophageal varices, hypertensive portal gastropathy in the antrum and body of stomach, mild gastitis.\par \par Colonoscopy: 2017 by Dr Velez, single polyp in the sigmoid colon and rectum and mild diverticulosis. Patho showed tubular adenoma (sigmoid) and hyperplastic polyp (rectum)

## 2022-02-01 DIAGNOSIS — Z85.05 PERSONAL HISTORY OF MALIGNANT NEOPLASM OF LIVER: ICD-10-CM

## 2022-02-01 DIAGNOSIS — K74.60 UNSPECIFIED CIRRHOSIS OF LIVER: ICD-10-CM

## 2022-02-01 DIAGNOSIS — B18.2 CHRONIC VIRAL HEPATITIS C: ICD-10-CM

## 2022-02-15 ENCOUNTER — LABORATORY RESULT (OUTPATIENT)
Age: 57
End: 2022-02-15

## 2022-02-24 ENCOUNTER — APPOINTMENT (OUTPATIENT)
Dept: INTERNAL MEDICINE | Facility: CLINIC | Age: 57
End: 2022-02-24

## 2022-03-09 ENCOUNTER — NON-APPOINTMENT (OUTPATIENT)
Age: 57
End: 2022-03-09

## 2022-03-10 ENCOUNTER — APPOINTMENT (OUTPATIENT)
Dept: INTERNAL MEDICINE | Facility: CLINIC | Age: 57
End: 2022-03-10

## 2022-03-15 LAB
A1AT SERPL-MCNC: 161 MG/DL
AFP-TM SERPL-MCNC: <1.8 NG/ML
ALBUMIN SERPL ELPH-MCNC: 4.1 G/DL
ALP BLD-CCNC: 175 U/L
ALT SERPL-CCNC: 99 U/L
ANION GAP SERPL CALC-SCNC: 15 MMOL/L
AST SERPL-CCNC: 82 U/L
BASOPHILS # BLD AUTO: 0.06 K/UL
BASOPHILS NFR BLD AUTO: 0.8 %
BILIRUB DIRECT SERPL-MCNC: 0.4 MG/DL
BILIRUB SERPL-MCNC: 1.6 MG/DL
BUN SERPL-MCNC: 18 MG/DL
CALCIUM SERPL-MCNC: 10.5 MG/DL
CHLORIDE SERPL-SCNC: 102 MMOL/L
CO2 SERPL-SCNC: 23 MMOL/L
CREAT SERPL-MCNC: 0.7 MG/DL
EOSINOPHIL # BLD AUTO: 0.01 K/UL
EOSINOPHIL NFR BLD AUTO: 0.1 %
GLUCOSE SERPL-MCNC: 145 MG/DL
HBV CORE IGG+IGM SER QL: REACTIVE
HBV SURFACE AB SER QL: REACTIVE
HBV SURFACE AG SER QL: NONREACTIVE
HCT VFR BLD CALC: 48.4 %
HCV GENTYP BLD NAA+PROBE: NORMAL
HCV RNA SERPL NAA DL=5-ACNC: 277 IU/ML
HCV RNA SERPL NAA+PROBE-LOG IU: 2.44 LOGIU/ML
HEPC RNA INTERP: DETECTED IU/ML
HGB BLD-MCNC: 17.1 G/DL
IMM GRANULOCYTES NFR BLD AUTO: 0.3 %
LYMPHOCYTES # BLD AUTO: 1.92 K/UL
LYMPHOCYTES NFR BLD AUTO: 24.9 %
MAGNESIUM SERPL-MCNC: 1.5 MG/DL
MAN DIFF?: NORMAL
MCHC RBC-ENTMCNC: 33.9 PG
MCHC RBC-ENTMCNC: 35.3 G/DL
MCV RBC AUTO: 96 FL
MONOCYTES # BLD AUTO: 0.69 K/UL
MONOCYTES NFR BLD AUTO: 8.9 %
NEUTROPHILS # BLD AUTO: 5.02 K/UL
NEUTROPHILS NFR BLD AUTO: 65 %
PLATELET # BLD AUTO: 94 K/UL
POTASSIUM SERPL-SCNC: 4.3 MMOL/L
PROT SERPL-MCNC: 7.8 G/DL
RBC # BLD: 5.04 M/UL
RBC # FLD: 13.1 %
SODIUM SERPL-SCNC: 140 MMOL/L
WBC # FLD AUTO: 7.72 K/UL

## 2022-03-17 ENCOUNTER — APPOINTMENT (OUTPATIENT)
Dept: GASTROENTEROLOGY | Facility: CLINIC | Age: 57
End: 2022-03-17

## 2022-03-28 ENCOUNTER — APPOINTMENT (OUTPATIENT)
Dept: HEPATOLOGY | Facility: CLINIC | Age: 57
End: 2022-03-28

## 2022-04-13 ENCOUNTER — RX RENEWAL (OUTPATIENT)
Age: 57
End: 2022-04-13

## 2022-04-14 ENCOUNTER — APPOINTMENT (OUTPATIENT)
Dept: GASTROENTEROLOGY | Facility: CLINIC | Age: 57
End: 2022-04-14

## 2022-05-11 ENCOUNTER — NON-APPOINTMENT (OUTPATIENT)
Age: 57
End: 2022-05-11

## 2022-05-20 ENCOUNTER — NON-APPOINTMENT (OUTPATIENT)
Age: 57
End: 2022-05-20

## 2022-05-25 ENCOUNTER — APPOINTMENT (OUTPATIENT)
Dept: GASTROENTEROLOGY | Facility: CLINIC | Age: 57
End: 2022-05-25
Payer: MEDICAID

## 2022-05-25 PROCEDURE — 91200 LIVER ELASTOGRAPHY: CPT

## 2022-06-01 ENCOUNTER — OUTPATIENT (OUTPATIENT)
Dept: OUTPATIENT SERVICES | Facility: HOSPITAL | Age: 57
LOS: 1 days | Discharge: HOME | End: 2022-06-01

## 2022-06-01 ENCOUNTER — APPOINTMENT (OUTPATIENT)
Dept: INTERNAL MEDICINE | Facility: CLINIC | Age: 57
End: 2022-06-01
Payer: MEDICAID

## 2022-06-01 VITALS
DIASTOLIC BLOOD PRESSURE: 73 MMHG | WEIGHT: 210 LBS | TEMPERATURE: 95.5 F | BODY MASS INDEX: 31.1 KG/M2 | HEART RATE: 74 BPM | OXYGEN SATURATION: 97 % | SYSTOLIC BLOOD PRESSURE: 108 MMHG | HEIGHT: 69 IN

## 2022-06-01 DIAGNOSIS — R25.2 CRAMP AND SPASM: ICD-10-CM

## 2022-06-01 PROCEDURE — 99214 OFFICE O/P EST MOD 30 MIN: CPT

## 2022-06-01 RX ORDER — VARENICLINE TARTRATE 0.5 (11)-1
0.5 MG X 11 & KIT ORAL
Qty: 30 | Refills: 3 | Status: DISCONTINUED | COMMUNITY
Start: 2021-08-19 | End: 2022-06-01

## 2022-06-01 RX ORDER — VELPATASVIR AND SOFOSBUVIR 100; 400 MG/1; MG/1
400-100 TABLET, FILM COATED ORAL
Qty: 28 | Refills: 3 | Status: DISCONTINUED | COMMUNITY
Start: 2022-03-08 | End: 2022-06-01

## 2022-06-01 RX ORDER — OMEGA-3/DHA/EPA/FISH OIL 300-1000MG
400 CAPSULE ORAL
Qty: 30 | Refills: 3 | Status: ACTIVE | COMMUNITY
Start: 2022-06-01 | End: 1900-01-01

## 2022-06-01 RX ORDER — UREA 40 G/100G
40 CREAM TOPICAL DAILY
Qty: 1 | Refills: 3 | Status: DISCONTINUED | COMMUNITY
Start: 2021-04-02 | End: 2022-06-01

## 2022-06-01 RX ORDER — PROPRANOLOL HYDROCHLORIDE 10 MG/1
10 TABLET ORAL EVERY 8 HOURS
Qty: 90 | Refills: 0 | Status: DISCONTINUED | COMMUNITY
Start: 2021-06-03 | End: 2022-06-01

## 2022-06-01 RX ORDER — LISINOPRIL 2.5 MG/1
2.5 TABLET ORAL
Qty: 30 | Refills: 0 | Status: DISCONTINUED | COMMUNITY
Start: 2021-08-19 | End: 2022-06-01

## 2022-06-01 RX ORDER — VARENICLINE 0.5 MG/1
0.5 TABLET, FILM COATED ORAL DAILY
Qty: 30 | Refills: 3 | Status: DISCONTINUED | COMMUNITY
Start: 2021-11-18 | End: 2022-06-01

## 2022-06-01 RX ORDER — EMPAGLIFLOZIN 10 MG/1
10 TABLET, FILM COATED ORAL DAILY
Qty: 30 | Refills: 0 | Status: DISCONTINUED | COMMUNITY
Start: 2021-08-19 | End: 2022-06-01

## 2022-06-01 RX ORDER — CONTAINER,EMPTY
EACH MISCELLANEOUS
Qty: 1 | Refills: 0 | Status: DISCONTINUED | COMMUNITY
Start: 2021-08-26 | End: 2022-06-01

## 2022-06-01 NOTE — COUNSELING
[Cessation strategies including cessation program discussed] : Cessation strategies including cessation program discussed [Use of nicotine replacement therapies and other medications discussed] : Use of nicotine replacement therapies and other medications discussed [Encouraged to pick a quit date and identify support needed to quit] : Encouraged to pick a quit date and identify support needed to quit [AUDIT-C Screening administered and reviewed] : AUDIT-C Screening administered and reviewed [Hazards of at-risk alcohol use discussed] : Hazards of at-risk alcohol use discussed [Strategies to reduce or eliminate alcohol use discussed] : Strategies to reduce or eliminate alcohol use discussed

## 2022-06-01 NOTE — HEALTH RISK ASSESSMENT
[Current] : Current [20 or more] : 20 or more [Yes] : Yes [2 - 3 times a week (3 pts)] : 2 - 3  times a week (3 points) [Less than monthly (1 pt)] : Less than monthly (1 point)

## 2022-06-01 NOTE — PHYSICAL EXAM
[No Acute Distress] : no acute distress [Well Nourished] : well nourished [Normal Sclera/Conjunctiva] : normal sclera/conjunctiva [Normal Outer Ear/Nose] : the outer ears and nose were normal in appearance [No JVD] : no jugular venous distention [No Respiratory Distress] : no respiratory distress  [Normal Rate] : normal rate  [No Edema] : there was no peripheral edema [No Joint Swelling] : no joint swelling [No Rash] : no rash [Coordination Grossly Intact] : coordination grossly intact

## 2022-06-07 ENCOUNTER — NON-APPOINTMENT (OUTPATIENT)
Age: 57
End: 2022-06-07

## 2022-06-16 NOTE — ASU PATIENT PROFILE, ADULT - PREOP PAIN SCORE
Called and spoke with Pt informed Darleen Badillo NP stated Chlamydia and gonorrhea negative, and she understood. 0

## 2022-06-22 NOTE — REVIEW OF SYSTEMS
[Hearing Loss] : hearing loss [Fever] : no fever [Chills] : no chills [Night Sweats] : no night sweats [Discharge] : no discharge [Pain] : no pain [Earache] : no earache [Nasal Discharge] : no nasal discharge [Chest Pain] : no chest pain [Palpitations] : no palpitations [Orthopena] : no orthopnea [Shortness Of Breath] : no shortness of breath [Wheezing] : no wheezing [Abdominal Pain] : no abdominal pain [Nausea] : no nausea [Vomiting] : no vomiting [Melena] : no melena [Dysuria] : no dysuria [Frequency] : no frequency [Joint Pain] : no joint pain [Back Pain] : no back pain [Itching] : no itching [Skin Rash] : no skin rash [Headache] : no headache

## 2022-06-22 NOTE — ASSESSMENT
[FreeTextEntry1] : -#Transaminitis\par #Liver Cirrhosis\par #HCC\par - Liver Cirrhosis likely secondary to HCV infection\par - S/p TARE with Yttrium-90 on 10/06/2016. 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 No longer taking aspirin. \par -Following with heme/onc\par - Other work up for CLD is negative except for: ESTELLE (1:640) and Antismooth -muscle antibodies ( 1:20)\par -will follow with hepatology, missed his recent appointment \par - given Hep A and B vaccine  2021\par - Stable Liver Cirrhosis ( No Ascites/SBP, No esophageal variceal bleeding, no hepatic encephalopathy)\par - EGD in 2021 shows some varices and non-erosive gastritis, repeat in a year \par - c/w Propranolol \par - Patient with Cirrhosis, keep MAP > 85mmHg \par - Repeat CT scan Abdomen/pelvis done on 2021: showed stable disease \par -AFP wnl\par -MR pending, US done, results pending\par \par #Type II DM: \par - HbA1c: 9.9%, is on jardiance and ozempic, did not repeat labs \par -has also been taking metformin\par - Pt needs to see optometry, pt refused the dilated eye exam and did not feel comfortable going back, counselled\par - Podiatry follow up: \par - Renal function: wnl --> started on Lisinopril 5mg po once daily. - Patient with Cirrhosis, keep MAP > 85mmHg.\par \par #Dyslipidemia: \par - T Total Chol: 171 HDL: 40 LDL: 110\par - ASCVD score: 21.52%\par - holding statin as per GI in the setting of liver disease\par \par #Smoker: \par - Active smoker, still smoking half a pack/day\par - educated on smoking cessation. \par -would like to try patches\par \par #B/L LE cramps:\par insisted small breaks, hydration and stretching\par will order mag, was lower on last blood work\par \par \par #HCM: \par -labs today \par - CT chest in 2021 showed no lung masses, authorized for repeat for lung cancer screening \par - Colonoscopy: Done in . S/p polypectomy. Repeat in .\par - EGD in 2021, will need repeat in a year \par - The patient has Cirrhosis, took his Pneumovax Aug 2020\par -RTC in 3 months. \par \par

## 2022-06-22 NOTE — HISTORY OF PRESENT ILLNESS
[Ad Hoc ] : provided by an ad hoc  [Time Spent: ____ minutes] : Total time spent using  services: [unfilled] minutes. The patient's primary language is not English thus required  services. [FreeTextEntry1] : 56 Years old male here for follow up.  [Interpreters_IDNumber] : sign language [Interpreters_FullName] : gonzález [de-identified] : 56-year-old, Male patient with PMHx of Type II DM, HCC secondary to hepatitis C-induced liver cirrhosis s/p TaRE in 2016. Pt accompanied by  Shanice at [facility]. Patient is here for medication refill and has no complaints. He is still actively smoking and drinking every other night, extensive counselling provided. He complains of feeling cramping in his legs after a miles worth of walk. He denied any chest pain or shortness of breath. Adequate hydration and stretching insisted. Patient is willing to try patches for smoking cessation.

## 2022-06-27 ENCOUNTER — OUTPATIENT (OUTPATIENT)
Dept: OUTPATIENT SERVICES | Facility: HOSPITAL | Age: 57
LOS: 1 days | Discharge: HOME | End: 2022-06-27

## 2022-06-27 ENCOUNTER — APPOINTMENT (OUTPATIENT)
Dept: HEPATOLOGY | Facility: CLINIC | Age: 57
End: 2022-06-27

## 2022-06-27 ENCOUNTER — NON-APPOINTMENT (OUTPATIENT)
Age: 57
End: 2022-06-27

## 2022-06-27 VITALS
HEIGHT: 69 IN | HEART RATE: 76 BPM | OXYGEN SATURATION: 96 % | DIASTOLIC BLOOD PRESSURE: 80 MMHG | SYSTOLIC BLOOD PRESSURE: 122 MMHG | TEMPERATURE: 96.3 F

## 2022-06-27 DIAGNOSIS — K74.60 UNSPECIFIED CIRRHOSIS OF LIVER: ICD-10-CM

## 2022-06-27 DIAGNOSIS — B19.20 UNSPECIFIED VIRAL HEPATITIS C WITHOUT HEPATIC COMA: ICD-10-CM

## 2022-06-27 DIAGNOSIS — Z85.05 PERSONAL HISTORY OF MALIGNANT NEOPLASM OF LIVER: ICD-10-CM

## 2022-06-27 PROCEDURE — 99214 OFFICE O/P EST MOD 30 MIN: CPT | Mod: GC

## 2022-06-27 NOTE — ASSESSMENT
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 on ASA, Chronic Hep C (no prior Rx received, No Hep vaccination received as per pt), newly diagnosed DM on metformin, GERD, was sent for the workup of HCC, chronic Hepatitis C?, transaminitis by PMD.\par \par # Transaminitis\par - Likely from HCC / Hep C\par - Has been off Lipitor Since early 2021\par - Repeat LFTs from Nov 2021 remain elevated despite holding Lipitor\par - Ok to resume Lipitor \par \par # HCC s/p Y90 complicated by celiac artery dissection on ASA\par # Stable 4.5 cm liver mass since 2019 with no new lesions\par #Hep C genotype 1b\par # small esophageal varices on EGD 2017\par - CTAP from Jan 2021: Stable approximate 4.5 cm hepatic mass (segment 5 and 8) without enhancement. No new lesions.\par Stable cirrhosis and splenomegaly/varices.\par - Other CLD w/u neg besides ESTELLE (1:640), Anti-Smooth muscle  Ab (1:20)\par - Get INR \par - no need for repeat EGD \par - Obtain MR abdomen with contrast, liver protocol \par - c/w propranolol 20 mg BID \par - Hep A vaccine first dose was in Nov 2021, administered 2nd dose today \par \par # Hep C\par - Hep A neg, Hep B Core IgG Pos w/ Neg Ab and Ag from 2016 indicating prior exposure\par \par # Colon polyps\par - Sigmoid colon: tubular adenoma, fu colonoscopy this year\par - Rectal polyp: hyperplastic polyp\par - repeat colonoscopy\par \par f/u in 1 month \par \par

## 2022-06-27 NOTE — PHYSICAL EXAM
[General Appearance - Alert] : alert [General Appearance - In No Acute Distress] : in no acute distress [General Appearance - Well Nourished] : well nourished [Sclera] : the sclera and conjunctiva were normal [Outer Ear] : the ears and nose were normal in appearance [Hearing Threshold Finger Rub Not [place]] : hearing was normal [Neck Appearance] : the appearance of the neck was normal [Jugular Venous Distention Increased] : there was no jugular-venous distention [Heart Rate And Rhythm] : heart rate was normal and rhythm regular [Heart Sounds] : normal S1 and S2 [Edema] : there was no peripheral edema [Abdomen Soft] : soft [Abdomen Tenderness] : non-tender [Abdomen Mass (___ Cm)] : no abdominal mass palpated [No Spinal Tenderness] : no spinal tenderness [Abnormal Walk] : normal gait [Musculoskeletal - Swelling] : no joint swelling seen [Skin Color & Pigmentation] : normal skin color and pigmentation [] : no rash [Cranial Nerves] : cranial nerves 2-12 were intact [Affect] : the affect was normal [Scleral Icterus] : No Scleral Icterus [Spider Angioma] : No spider angioma(s) were observed [Abdominal  Ascites] : no ascites [Asterixis] : no asterixis observed [Jaundice] : No jaundice [Palmar Erythema] : no Palmar Erythema [Depression] : no depression [Hallucinations] : ~T no ~M hallucinations

## 2022-06-27 NOTE — HISTORY OF PRESENT ILLNESS
[de-identified] : : Marladevyndakota 56 years old male known to have HCC s/p Y90 complicated by celiac artery dissection on ASA, Chronic Hep C (no prior Rx received, No Hep vaccination received as per pt), newly diagnosed DM on metformin, GERD, was sent for the workup of HCC, chronic Hepatitis C?, transaminitis by PMD.\par \par Pt was initially on the [place] [facility] transplant list 3-4 years ago, but he took himself off the list as he had been doing ok after the Y90 procedure. Additionally patient has been drinking EtOH 2/3 Units every few days.\par \par Pt states he is doing ok. The pt denies any nausea, vomiting, abdominal pain, abdominal distension, diarrhea, constipation, change in the color of stool or urine, recent change in appetite or weight. Pt was recently taken off lipitor by the PMD for transaminitis.\par \par Past EGD: 2017 by Dr Velez: showed small esophageal varices, hypertensive portal gastropathy in the antrum and body of stomach, mild gastitis.\par \par Colonoscopy: 2017 by Dr Velez, single polyp in the sigmoid colon and rectum and mild diverticulosis. Patho showed tubular adenoma (sigmoid) and hyperplastic polyp (rectum)

## 2022-06-28 LAB
ALBUMIN SERPL ELPH-MCNC: 3.9 G/DL
ALBUMIN SERPL ELPH-MCNC: 4.1 G/DL
ALP BLD-CCNC: 150 U/L
ALP BLD-CCNC: 163 U/L
ALT SERPL-CCNC: 126 U/L
ALT SERPL-CCNC: 87 U/L
ANION GAP SERPL CALC-SCNC: 13 MMOL/L
ANION GAP SERPL CALC-SCNC: 13 MMOL/L
AST SERPL-CCNC: 104 U/L
AST SERPL-CCNC: 141 U/L
BASOPHILS # BLD AUTO: 0.05 K/UL
BASOPHILS # BLD AUTO: 0.07 K/UL
BASOPHILS NFR BLD AUTO: 0.9 %
BASOPHILS NFR BLD AUTO: 1.2 %
BILIRUB SERPL-MCNC: 1.1 MG/DL
BILIRUB SERPL-MCNC: 1.4 MG/DL
BUN SERPL-MCNC: 12 MG/DL
BUN SERPL-MCNC: 18 MG/DL
CALCIUM SERPL-MCNC: 9.3 MG/DL
CALCIUM SERPL-MCNC: 9.5 MG/DL
CHLORIDE SERPL-SCNC: 105 MMOL/L
CHLORIDE SERPL-SCNC: 99 MMOL/L
CHOLEST SERPL-MCNC: 198 MG/DL
CHOLEST SERPL-MCNC: 217 MG/DL
CO2 SERPL-SCNC: 22 MMOL/L
CO2 SERPL-SCNC: 23 MMOL/L
CREAT SERPL-MCNC: 0.51 MG/DL
CREAT SERPL-MCNC: 0.6 MG/DL
EGFR: 114 ML/MIN/1.73M2
EOSINOPHIL # BLD AUTO: 0 K/UL
EOSINOPHIL # BLD AUTO: 0.14 K/UL
EOSINOPHIL NFR BLD AUTO: 0 %
EOSINOPHIL NFR BLD AUTO: 2.5 %
ESTIMATED AVERAGE GLUCOSE: 177 MG/DL
ESTIMATED AVERAGE GLUCOSE: 217 MG/DL
GLUCOSE SERPL-MCNC: 152 MG/DL
GLUCOSE SERPL-MCNC: 277 MG/DL
HBA1C MFR BLD HPLC: 7.8 %
HBA1C MFR BLD HPLC: 9.2 %
HCT VFR BLD CALC: 47.6 %
HCT VFR BLD CALC: 48.9 %
HDLC SERPL-MCNC: 44 MG/DL
HDLC SERPL-MCNC: 58 MG/DL
HGB BLD-MCNC: 16.1 G/DL
HGB BLD-MCNC: 17.2 G/DL
IMM GRANULOCYTES NFR BLD AUTO: 0.2 %
IMM GRANULOCYTES NFR BLD AUTO: 0.3 %
LDLC SERPL CALC-MCNC: 118 MG/DL
LDLC SERPL CALC-MCNC: 123 MG/DL
LYMPHOCYTES # BLD AUTO: 1.42 K/UL
LYMPHOCYTES # BLD AUTO: 1.43 K/UL
LYMPHOCYTES NFR BLD AUTO: 23.5 %
LYMPHOCYTES NFR BLD AUTO: 25.4 %
MAN DIFF?: NORMAL
MAN DIFF?: NORMAL
MCHC RBC-ENTMCNC: 33.8 GM/DL
MCHC RBC-ENTMCNC: 34.1 PG
MCHC RBC-ENTMCNC: 34.5 PG
MCHC RBC-ENTMCNC: 35.2 G/DL
MCV RBC AUTO: 101.9 FL
MCV RBC AUTO: 96.8 FL
MONOCYTES # BLD AUTO: 0.58 K/UL
MONOCYTES # BLD AUTO: 0.63 K/UL
MONOCYTES NFR BLD AUTO: 10.3 %
MONOCYTES NFR BLD AUTO: 10.4 %
NEUTROPHILS # BLD AUTO: 3.41 K/UL
NEUTROPHILS # BLD AUTO: 3.9 K/UL
NEUTROPHILS NFR BLD AUTO: 60.7 %
NEUTROPHILS NFR BLD AUTO: 64.6 %
NONHDLC SERPL-MCNC: 140 MG/DL
NONHDLC SERPL-MCNC: 173 MG/DL
PLATELET # BLD AUTO: 82 K/UL
PLATELET # BLD AUTO: 87 K/UL
POTASSIUM SERPL-SCNC: 4 MMOL/L
POTASSIUM SERPL-SCNC: 4.2 MMOL/L
PROT SERPL-MCNC: 7 G/DL
PROT SERPL-MCNC: 7.8 G/DL
RBC # BLD: 4.67 M/UL
RBC # BLD: 5.05 M/UL
RBC # FLD: 13.8 %
RBC # FLD: 13.8 %
SODIUM SERPL-SCNC: 134 MMOL/L
SODIUM SERPL-SCNC: 142 MMOL/L
TRIGL SERPL-MCNC: 112 MG/DL
TRIGL SERPL-MCNC: 251 MG/DL
TSH SERPL-ACNC: 2.47 UIU/ML
WBC # FLD AUTO: 5.62 K/UL
WBC # FLD AUTO: 6.04 K/UL

## 2022-06-29 ENCOUNTER — NON-APPOINTMENT (OUTPATIENT)
Age: 57
End: 2022-06-29

## 2022-07-11 ENCOUNTER — APPOINTMENT (OUTPATIENT)
Dept: CARDIOTHORACIC SURGERY | Facility: CLINIC | Age: 57
End: 2022-07-11

## 2022-07-11 VITALS — HEIGHT: 69 IN | BODY MASS INDEX: 31.55 KG/M2 | WEIGHT: 213 LBS

## 2022-07-11 DIAGNOSIS — F17.210 NICOTINE DEPENDENCE, CIGARETTES, UNCOMPLICATED: ICD-10-CM

## 2022-07-11 DIAGNOSIS — F17.200 NICOTINE DEPENDENCE, UNSPECIFIED, UNCOMPLICATED: ICD-10-CM

## 2022-07-11 PROCEDURE — G0296 VISIT TO DETERM LDCT ELIG: CPT

## 2022-07-11 NOTE — ASSESSMENT
[Discussed Risks and Advised to Quit Smoking] : Discussed risks and advised to quit smoking [Discussed Cessation Medication] : cessation medication was discussed [Patient refused treatment] : Patient refused treatment

## 2022-07-11 NOTE — PLAN
[Smoking Cessation] : smoking cessation [Lifestlye changes] : lifestyle changes [Regular follow-up with healthcare provider] : regular follow-up with healthcare provider [FreeTextEntry1] : Plan:\par -Low Dose CT chest for lung cancer screening\par -Follow up with patient and his referring provider after his LDCT results have been reviewed by the multi-disciplinary clinical team\par -Encouraged smoking cessation\par -Patient declined [facility] Smokers Quitline literature\par -Smoking Cessation was offered, - denied\par Should I Screen? tool utilized. 6 year risk of lung cancer is 2%. \par \par Patient wishes to proceed with screening.\par \par Engaged in shared decision making with . JUDI AVINASH . Answered all questions. He verbalized understanding and agreement. He knows to call back with any questions or concerns\par \par

## 2022-07-11 NOTE — HISTORY OF PRESENT ILLNESS
[Home] : at home, [unfilled] , at the time of the visit. [Medical Office: ([facility])___] : at the medical office located in  [Verbal consent obtained from patient] : the patient, [unfilled] [Current] : current smoker [>= 30 pack years] : >= 30 pack years [TextBox_13] : Mr. JUDI DA SILVA is a 56 year old man denies any medical history\par \par He was seen in the office by Dr. HERRERA for review of eligibility for, as well as, discussion of Low-Dose CT lung cancer screening program. Over the telephone today we reviewed and confirmed that the patient meets screening eligibility criteria:\par -Age: 56 year \par Smoking status:\par -Current smoker -\par -Number of pack(s) per day: 2\par -Number of years smoked: 39\par -Number of pack years smokin\par \par Mr. DA SILVA denies any signs or symptoms of lung cancer including new cough, change in cough, hemoptysis and unintentional weight loss. \par Mr. DA SILVA denies any personal history of lung cancer. No lung cancer in a 1st degree relative. Denies any history of lung disease. Denies any history of occupational exposures\par \par Patient Deaf,  used to obtain information\par \par  [TextBox_6] : 2 [TextBox_8] : 39

## 2022-07-14 ENCOUNTER — OUTPATIENT (OUTPATIENT)
Dept: OUTPATIENT SERVICES | Facility: HOSPITAL | Age: 57
LOS: 1 days | Discharge: HOME | End: 2022-07-14

## 2022-07-14 ENCOUNTER — RESULT REVIEW (OUTPATIENT)
Age: 57
End: 2022-07-14

## 2022-07-14 DIAGNOSIS — R07.9 CHEST PAIN, UNSPECIFIED: ICD-10-CM

## 2022-07-14 PROCEDURE — 71271 CT THORAX LUNG CANCER SCR C-: CPT | Mod: 26

## 2022-07-15 LAB
INR PPP: 1.05 RATIO
PT BLD: 12.2 SEC

## 2022-07-18 ENCOUNTER — NON-APPOINTMENT (OUTPATIENT)
Age: 57
End: 2022-07-18

## 2022-08-01 ENCOUNTER — OUTPATIENT (OUTPATIENT)
Dept: OUTPATIENT SERVICES | Facility: HOSPITAL | Age: 57
LOS: 1 days | Discharge: HOME | End: 2022-08-01

## 2022-08-01 ENCOUNTER — APPOINTMENT (OUTPATIENT)
Dept: HEPATOLOGY | Facility: CLINIC | Age: 57
End: 2022-08-01

## 2022-08-01 VITALS
DIASTOLIC BLOOD PRESSURE: 70 MMHG | HEIGHT: 69 IN | HEART RATE: 66 BPM | BODY MASS INDEX: 31.55 KG/M2 | OXYGEN SATURATION: 99 % | WEIGHT: 213 LBS | SYSTOLIC BLOOD PRESSURE: 120 MMHG

## 2022-08-01 DIAGNOSIS — B19.20 UNSPECIFIED VIRAL HEPATITIS C W/OUT HEPATIC COMA: ICD-10-CM

## 2022-08-01 DIAGNOSIS — Z12.11 ENCOUNTER FOR SCREENING FOR MALIGNANT NEOPLASM OF COLON: ICD-10-CM

## 2022-08-01 PROCEDURE — 99215 OFFICE O/P EST HI 40 MIN: CPT | Mod: GC

## 2022-08-08 DIAGNOSIS — Z12.11 ENCOUNTER FOR SCREENING FOR MALIGNANT NEOPLASM OF COLON: ICD-10-CM

## 2022-08-08 DIAGNOSIS — K74.60 UNSPECIFIED CIRRHOSIS OF LIVER: ICD-10-CM

## 2022-08-08 DIAGNOSIS — B19.20 UNSPECIFIED VIRAL HEPATITIS C WITHOUT HEPATIC COMA: ICD-10-CM

## 2022-08-31 LAB
AFP-TM SERPL-MCNC: <1.8 NG/ML
ALBUMIN SERPL ELPH-MCNC: 3.7 G/DL
ALP BLD-CCNC: 202 U/L
ALT SERPL-CCNC: 105 U/L
ANION GAP SERPL CALC-SCNC: 16 MMOL/L
AST SERPL-CCNC: 136 U/L
BASOPHILS # BLD AUTO: 0.06 K/UL
BASOPHILS NFR BLD AUTO: 1.2 %
BILIRUB SERPL-MCNC: 0.8 MG/DL
BUN SERPL-MCNC: 8 MG/DL
CALCIUM SERPL-MCNC: 9.2 MG/DL
CHLORIDE SERPL-SCNC: 104 MMOL/L
CO2 SERPL-SCNC: 19 MMOL/L
CREAT SERPL-MCNC: 0.6 MG/DL
EGFR: 113 ML/MIN/1.73M2
EOSINOPHIL # BLD AUTO: 0.01 K/UL
EOSINOPHIL NFR BLD AUTO: 0.2 %
GLUCOSE SERPL-MCNC: 246 MG/DL
HCT VFR BLD CALC: 44.9 %
HCV RNA SERPL NAA+PROBE-LOG IU: NOT DETECTED LOGIU/ML
HEPATITIS A IGG ANTIBODY: REACTIVE
HEPC RNA INTERP: NOT DETECTED IU/ML
HGB BLD-MCNC: 15.9 G/DL
IMM GRANULOCYTES NFR BLD AUTO: 0.2 %
INR PPP: 1.02 RATIO
LYMPHOCYTES # BLD AUTO: 1.65 K/UL
LYMPHOCYTES NFR BLD AUTO: 32 %
MAN DIFF?: NORMAL
MCHC RBC-ENTMCNC: 34.3 PG
MCHC RBC-ENTMCNC: 35.4 G/DL
MCV RBC AUTO: 97 FL
MONOCYTES # BLD AUTO: 0.44 K/UL
MONOCYTES NFR BLD AUTO: 8.5 %
NEUTROPHILS # BLD AUTO: 2.99 K/UL
NEUTROPHILS NFR BLD AUTO: 57.9 %
PLATELET # BLD AUTO: 88 K/UL
POTASSIUM SERPL-SCNC: 3.9 MMOL/L
PROT SERPL-MCNC: 6.8 G/DL
PT BLD: 11.7 SEC
RBC # BLD: 4.63 M/UL
RBC # FLD: 13 %
SODIUM SERPL-SCNC: 139 MMOL/L
WBC # FLD AUTO: 5.16 K/UL

## 2022-09-11 ENCOUNTER — LABORATORY RESULT (OUTPATIENT)
Age: 57
End: 2022-09-11

## 2022-09-12 ENCOUNTER — NON-APPOINTMENT (OUTPATIENT)
Age: 57
End: 2022-09-12

## 2022-09-12 ENCOUNTER — LABORATORY RESULT (OUTPATIENT)
Age: 57
End: 2022-09-12

## 2022-09-12 ENCOUNTER — APPOINTMENT (OUTPATIENT)
Dept: INTERNAL MEDICINE | Facility: CLINIC | Age: 57
End: 2022-09-12

## 2022-09-12 ENCOUNTER — OUTPATIENT (OUTPATIENT)
Dept: OUTPATIENT SERVICES | Facility: HOSPITAL | Age: 57
LOS: 1 days | Discharge: HOME | End: 2022-09-12

## 2022-09-12 ENCOUNTER — APPOINTMENT (OUTPATIENT)
Dept: HEPATOLOGY | Facility: CLINIC | Age: 57
End: 2022-09-12

## 2022-09-12 VITALS
SYSTOLIC BLOOD PRESSURE: 121 MMHG | TEMPERATURE: 98.2 F | BODY MASS INDEX: 30.96 KG/M2 | OXYGEN SATURATION: 100 % | HEIGHT: 69 IN | HEART RATE: 83 BPM | DIASTOLIC BLOOD PRESSURE: 82 MMHG | WEIGHT: 209 LBS

## 2022-09-12 VITALS
HEART RATE: 70 BPM | SYSTOLIC BLOOD PRESSURE: 110 MMHG | WEIGHT: 209 LBS | DIASTOLIC BLOOD PRESSURE: 70 MMHG | BODY MASS INDEX: 30.96 KG/M2 | OXYGEN SATURATION: 98 % | HEIGHT: 69 IN

## 2022-09-12 DIAGNOSIS — R76.8 OTHER SPECIFIED ABNORMAL IMMUNOLOGICAL FINDINGS IN SERUM: ICD-10-CM

## 2022-09-12 DIAGNOSIS — B18.2 CHRONIC VIRAL HEPATITIS C: ICD-10-CM

## 2022-09-12 DIAGNOSIS — K74.60 UNSPECIFIED CIRRHOSIS OF LIVER: ICD-10-CM

## 2022-09-12 DIAGNOSIS — E11.9 TYPE 2 DIABETES MELLITUS WITHOUT COMPLICATIONS: ICD-10-CM

## 2022-09-12 DIAGNOSIS — I10 ESSENTIAL (PRIMARY) HYPERTENSION: ICD-10-CM

## 2022-09-12 PROCEDURE — 99213 OFFICE O/P EST LOW 20 MIN: CPT | Mod: GC

## 2022-09-12 PROCEDURE — 99215 OFFICE O/P EST HI 40 MIN: CPT | Mod: GC

## 2022-09-12 RX ORDER — BLOOD-GLUCOSE METER
W/DEVICE KIT MISCELLANEOUS
Qty: 1 | Refills: 0 | Status: ACTIVE | COMMUNITY
Start: 2022-06-27

## 2022-09-12 NOTE — PHYSICAL EXAM
[Normal] : normal rate, regular rhythm, normal S1 and S2 and no murmur heard [Soft] : abdomen soft [de-identified] : no jaundice [de-identified] : hyperpigmented shins [de-identified] : obese, no fluid shift, +spider angioma [de-identified] : spider angioma [de-identified] : no ulcers. b/l hallux no nails from prior

## 2022-09-12 NOTE — HISTORY OF PRESENT ILLNESS
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 Additionally patient has been drinking EtOH, 3-4 drinks over 2 hours every other day. EtOH intake decreased from previously which was 2-3 shot of vodka every night.\par \par Pt states he is doing ok. The pt denies any nausea, vomiting, abdominal pain, abdominal distension, diarrhea, constipation, change in the color of stool or urine, recent change in appetite or weight. Pt was recently restarted on lipitor as when off his LFTs were still elevated. \par \par Past EGD: 2017 by Dr Velez: showed small esophageal varices, hypertensive portal gastropathy in the antrum and body of stomach, mild gastritis.\par \par Colonoscopy: 2017 by Dr Velez, single polyp in the sigmoid colon and rectum and mild diverticulosis. Patho showed tubular adenoma (sigmoid) and hyperplastic polyp (rectum). \par Scheduled for repeat colonoscopy on Sept 14 2022. \par Symptoms: denies fatigue, denies malaise, denies fever, denies arthralgias, denies myalgias, denies jaundice, denies abdominal pain, denies dark urine, denies pale stools, denies insomnia, denies rash, denies pruritus and denies shortness of breath.

## 2022-09-12 NOTE — HISTORY OF PRESENT ILLNESS
[FreeTextEntry1] : routine 3 mo fu for hcc, dm2, and htn. 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 Pt accompanied by   \par no active complaints. adherent c meds. still drinking 2-3 drinks on occasion, vodka. denies withdrawal seizures. still smoking but cut down from prior of 2 packs/day to now smoking 1pack/week.

## 2022-09-12 NOTE — END OF VISIT
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 Still drinking alcohol. but reduced intake. Patient cannot recall if took hepatitis C treatment. \par He picked a month supply of Epclusa in May but did not follow up after that. \par Abdomen soft non tender \par EGD 2021 showed small varices and started on NSB. On propranolol to 20 mg BID. No need for repeat EGD.\par No ascites on exam and last imaging.\par HCC screening. Hx of HCC with TACE in 2016. Awaiting liver protocol MRI and AFP. No need for CT chest as > 5 years. \par Did not complete hepatitis C treatment. Took only 4 weeks per pharmacy but patient has no recollection.HCV RNA undetectable in August.\par Check for SVR 12 titers end of this month. \par Prior Hep B exposure. Will check HBV DNA \par Hepatitis A - has immunity\par Colonoscopy arranged\par f/u in 1 month.  [Time Spent: ___ minutes] : I have spent [unfilled] minutes of time on the encounter.

## 2022-09-12 NOTE — END OF VISIT
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 Still drinking alcohol. but reduced intake. Patient cannot recall if took hepatitis C treatment. \par He picked a month supply of Epclusa in May but did not follow up after that. \par Abdomen soft non tender \par EGD 2021 showed small varices and started on NSB. On propranolol to 20 mg BID. No need for repeat EGD.\par No ascites on exam and last imaging.\par HCC screening. Hx of HCC with TACE in 2016. Awaiting liver protocol MRI and AFP. No need for CT chest as > 5 years. \par Did not complete hepatitis C treatment. Took only 4 weeks per pharmacy but patient has no recollection.HCV RNA undetectable in August.\par Check for SVR 12 titers end of this month. \par Prior Hep B exposure. Will check HBV DNA \par Hepatitis A - has immunity\par Colonoscopy arranged\par f/u in 1 month.  [Time Spent: ___ minutes] : I have spent [unfilled] minutes of time on the encounter.

## 2022-09-12 NOTE — PHYSICAL EXAM
[Non-Tender] : non-tender [General Appearance - Alert] : alert [General Appearance - In No Acute Distress] : in no acute distress [Sclera] : the sclera and conjunctiva were normal [PERRL With Normal Accommodation] : pupils were equal in size, round, and reactive to light [Extraocular Movements] : extraocular movements were intact [Outer Ear] : the ears and nose were normal in appearance [Oropharynx] : the oropharynx was normal [Neck Appearance] : the appearance of the neck was normal [Neck Cervical Mass (___cm)] : no neck mass was observed [Jugular Venous Distention Increased] : there was no jugular-venous distention [Thyroid Diffuse Enlargement] : the thyroid was not enlarged [Thyroid Nodule] : there were no palpable thyroid nodules [Heart Rate And Rhythm] : heart rate was normal and rhythm regular [Heart Sounds] : normal S1 and S2 [Heart Sounds Gallop] : no gallops [Murmurs] : no murmurs [Heart Sounds Pericardial Friction Rub] : no pericardial rub [Full Pulse] : the pedal pulses are present [Edema] : there was no peripheral edema [Breast Appearance] : normal in appearance [Breast Palpation Mass] : no palpable masses [Bowel Sounds] : normal bowel sounds [Abdomen Soft] : soft [Abdomen Tenderness] : non-tender [] : no hepato-splenomegaly [Abdomen Mass (___ Cm)] : no abdominal mass palpated [Cervical Lymph Nodes Enlarged Posterior Bilaterally] : posterior cervical [Cervical Lymph Nodes Enlarged Anterior Bilaterally] : anterior cervical [Supraclavicular Lymph Nodes Enlarged Bilaterally] : supraclavicular [Axillary Lymph Nodes Enlarged Bilaterally] : axillary [Femoral Lymph Nodes Enlarged Bilaterally] : femoral [Inguinal Lymph Nodes Enlarged Bilaterally] : inguinal [No CVA Tenderness] : no ~M costovertebral angle tenderness [No Spinal Tenderness] : no spinal tenderness [Abnormal Walk] : normal gait [Nail Clubbing] : no clubbing  or cyanosis of the fingernails [Musculoskeletal - Swelling] : no joint swelling seen [Motor Tone] : muscle strength and tone were normal [Deep Tendon Reflexes (DTR)] : deep tendon reflexes were 2+ and symmetric [Sensation] : the sensory exam was normal to light touch and pinprick [No Focal Deficits] : no focal deficits [Oriented To Time, Place, And Person] : oriented to person, place, and time [Impaired Insight] : insight and judgment were intact [Affect] : the affect was normal [Spider Angioma] : Spider angioma(s) was(were) observed [Scleral Icterus] : No Scleral Icterus [Asterixis] : no asterixis observed [Jaundice] : No jaundice [Palmar Erythema] : no Palmar Erythema [Depression] : no depression [Occult Blood Positive] : stool was negative for occult blood

## 2022-09-12 NOTE — HISTORY OF PRESENT ILLNESS
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 Additionally patient has been drinking EtOH, 3-4 drinks over 2 hours every other day. EtOH intake decreased from previously which was 2-3 shot of vodka every night.\par \par Pt states he is doing ok. The pt denies any nausea, vomiting, abdominal pain, abdominal distension, diarrhea, constipation, change in the color of stool or urine, recent change in appetite or weight. Pt was recently restarted on lipitor as when off his LFTs were still elevated. \par \par Past EGD: 2017 by Dr Velez: showed small esophageal varices, hypertensive portal gastropathy in the antrum and body of stomach, mild gastritis.\par \par Colonoscopy: 2017 by Dr Velez, single polyp in the sigmoid colon and rectum and mild diverticulosis. Patho showed tubular adenoma (sigmoid) and hyperplastic polyp (rectum). \par Scheduled for repeat colonoscopy on Sept 14 2022. \par Symptoms: denies fatigue, denies malaise, denies fever, denies arthralgias, denies myalgias, denies jaundice, denies abdominal pain, denies dark urine, denies pale stools, denies insomnia, denies rash, denies pruritus and denies shortness of breath.

## 2022-09-12 NOTE — ASSESSMENT
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 No new lesions.\par Stable cirrhosis and splenomegaly/varices.\par - Other CLD w/u neg besides ESTELLE (1:640), Anti-Smooth muscle Ab (1:20)\par - no need for repeat EGD --> c/w propranolol 20 mg BID \par - Hep A vaccine first dose was in Nov 2021, administered 2nd dose last visit\par - Hep A neg, Hep B Core IgG Pos w/ Neg Ab and Ag from 2016 indicating prior exposure\par - Hep A IgG ab reactive 9/27/22\par - HCV PCR: 277 on feb 2022--> as per pharmacy note he picked up the HCV treatment on May 2022--> patient cant recall if he took treatment\par - HCV PCR negative 9/27/22\par - AFP: 2/15/22: <1.8, 8/27/22: <1.8\par - INR 1.02 9/27/22\par \par Rec:\par - start to cut down on EtOH drinking\par - Awaiting MRI liver \par - Monitor LFts/INR/CBC\par -c/w propranolol 20 mg BID \par - f/u Hep C panel\par \par \par # Colon polyps\par - Sigmoid colon: tubular adenoma, fu colonoscopy this year\par - Rectal polyp: hyperplastic polyp\par - follow up with GI for repeat colonoscopy to be done 9/14/22\par

## 2022-09-12 NOTE — ASSESSMENT
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, Chronic Hep C (no prior Rx received, No Hep vaccination received as per pt), Liver cirrhosis, DM on metformin, GERD\par \par \par -#Transaminitis\par #Liver Cirrhosis\par # Hx alcohol abuse \par #HCC\par -follows c hepatology\par -alcohol cessation reinforced\par - can be on statin per last f/u\par \par #Type II DM: uncontrolled\par - HbA1c: 9.2%, is on jardiance and ozempic, repeat labs today\par -has also been taking metformin uptitrated to 750mg and tolerating (reports prior intolerance from higher dosing)\par - Pt needs to see optometry, pt refused the dilated eye exam and did not feel comfortable going back, counselled\par - Podiatry follow up: needed\par - Renal function: wnl --> started on Lisinopril 2.5mg po once daily. - Patient with Cirrhosis, keep MAP > 85mmHg.\par Diabetes;\par Low sugar, low carbohydrate diet \par Exercise Counseled \par Patient given opportunity to discuss frequency and target blood sugar levels\par Patient educated on symptoms of hypo/hyperglycemic events \par Counseled on: Yearly Ophthalmology and Podiatry Exam\par \par #HTN controlled\par HTN;\par DASH diet discussed and recommended\par Exercise and weight loss counseled \par Frequency and target at home BP readings discussed\par Decrease caffeine intake\par Treatment options and possible side effects discussed\par Patient counseled on symptoms of hypo/hypertension\par Counseled: Yearly Ophthalmology exams\par \par #obesity\par Obesity;\par Diet/Exercise Counseled\par Patient was counseled on changing their diet to low fat, low carbohydrates and low cholesterol.\par Patient was also counseled on increasing their activity to 45 minutes of exercise daily.\par \par \par \par \par #Dyslipidemia: \par - T Total Chol: 171 HDL: 40 LDL: 110\par - ASCVD score: 21.52%\par - can use statin per hepatology\par \par #Smoker: \par - Active smoker, still smoking half a pack/day\par - educated on smoking cessation. \par - can refill 14mg patches\par - \par -Low Dose CT chest for lung cancer screening performed c pulm\par \par \par #HCM: \par -labs today \par - CT chest in 2021 showed no lung masses, authorized for repeat for lung cancer screening \par  repeat WNL\par - Colonoscopy: Done in . S/p polypectomy. Repeat in .\par - EGD in 2021, no need for repeat per last hepatology\par - The patient has Cirrhosis, took his Pneumovax Aug 2020\par -RTC in 3 months. \par

## 2022-09-13 LAB
25(OH)D3 SERPL-MCNC: 12 NG/ML
CREAT SPEC-SCNC: 34 MG/DL
ESTIMATED AVERAGE GLUCOSE: 200 MG/DL
HBA1C MFR BLD HPLC: 8.6 %
MICROALBUMIN 24H UR DL<=1MG/L-MCNC: 3.3 MG/DL
MICROALBUMIN/CREAT 24H UR-RTO: 95 MG/G
TSH SERPL-ACNC: 4.98 UIU/ML

## 2022-09-14 ENCOUNTER — TRANSCRIPTION ENCOUNTER (OUTPATIENT)
Age: 57
End: 2022-09-14

## 2022-09-14 ENCOUNTER — RESULT REVIEW (OUTPATIENT)
Age: 57
End: 2022-09-14

## 2022-09-14 ENCOUNTER — OUTPATIENT (OUTPATIENT)
Dept: OUTPATIENT SERVICES | Facility: HOSPITAL | Age: 57
LOS: 1 days | Discharge: HOME | End: 2022-09-14

## 2022-09-14 VITALS
RESPIRATION RATE: 18 BRPM | HEART RATE: 73 BPM | DIASTOLIC BLOOD PRESSURE: 79 MMHG | TEMPERATURE: 97 F | SYSTOLIC BLOOD PRESSURE: 139 MMHG | HEIGHT: 69 IN | WEIGHT: 210.1 LBS

## 2022-09-14 VITALS
OXYGEN SATURATION: 98 % | DIASTOLIC BLOOD PRESSURE: 72 MMHG | SYSTOLIC BLOOD PRESSURE: 136 MMHG | RESPIRATION RATE: 20 BRPM | HEART RATE: 73 BPM

## 2022-09-14 PROCEDURE — 45385 COLONOSCOPY W/LESION REMOVAL: CPT

## 2022-09-14 PROCEDURE — 88305 TISSUE EXAM BY PATHOLOGIST: CPT | Mod: 26

## 2022-09-14 PROCEDURE — 45380 COLONOSCOPY AND BIOPSY: CPT | Mod: 59

## 2022-09-15 ENCOUNTER — NON-APPOINTMENT (OUTPATIENT)
Age: 57
End: 2022-09-15

## 2022-09-19 DIAGNOSIS — Z12.11 ENCOUNTER FOR SCREENING FOR MALIGNANT NEOPLASM OF COLON: ICD-10-CM

## 2022-09-19 DIAGNOSIS — E11.9 TYPE 2 DIABETES MELLITUS WITHOUT COMPLICATIONS: ICD-10-CM

## 2022-09-19 DIAGNOSIS — K64.8 OTHER HEMORRHOIDS: ICD-10-CM

## 2022-09-19 DIAGNOSIS — Z79.84 LONG TERM (CURRENT) USE OF ORAL HYPOGLYCEMIC DRUGS: ICD-10-CM

## 2022-09-19 DIAGNOSIS — K63.5 POLYP OF COLON: ICD-10-CM

## 2022-09-19 DIAGNOSIS — Z86.19 PERSONAL HISTORY OF OTHER INFECTIOUS AND PARASITIC DISEASES: ICD-10-CM

## 2022-09-19 DIAGNOSIS — D12.5 BENIGN NEOPLASM OF SIGMOID COLON: ICD-10-CM

## 2022-09-19 DIAGNOSIS — Z85.05 PERSONAL HISTORY OF MALIGNANT NEOPLASM OF LIVER: ICD-10-CM

## 2022-09-19 DIAGNOSIS — Z79.899 OTHER LONG TERM (CURRENT) DRUG THERAPY: ICD-10-CM

## 2022-09-19 DIAGNOSIS — Z86.010 PERSONAL HISTORY OF COLONIC POLYPS: ICD-10-CM

## 2022-09-19 LAB — SURGICAL PATHOLOGY STUDY: SIGNIFICANT CHANGE UP

## 2022-09-20 ENCOUNTER — OUTPATIENT (OUTPATIENT)
Dept: OUTPATIENT SERVICES | Facility: HOSPITAL | Age: 57
LOS: 1 days | Discharge: HOME | End: 2022-09-20

## 2022-09-20 ENCOUNTER — APPOINTMENT (OUTPATIENT)
Dept: PODIATRY | Facility: CLINIC | Age: 57
End: 2022-09-20

## 2022-09-20 PROCEDURE — 99213 OFFICE O/P EST LOW 20 MIN: CPT

## 2022-09-20 RX ORDER — DIFLORASONE DIACETATE 0.5 MG/G
0.05 CREAM TOPICAL 3 TIMES DAILY
Qty: 1 | Refills: 2 | Status: ACTIVE | COMMUNITY
Start: 2022-09-20 | End: 1900-01-01

## 2022-09-20 NOTE — ASSESSMENT
[FreeTextEntry1] : Diabetic Foot Risk Assessment\par - Educated on proepr foot care and shoe wear\par - Educated to lower his BG and A1c \par B/L foot pain - biomechanics and or elevated A1c are contributors (Neuropathic pain could not be excluded) \par - REcommend OTC orthotics with met pad to alleviate Ball of the foot pain \par - Rx Cream for pain and scaly skin\par RTO 4 weeks will re-eval - will consider xray \par

## 2022-09-20 NOTE — HISTORY OF PRESENT ILLNESS
[FreeTextEntry1] : Diabetic Foot evaluation.\par Pain/Burning sensation to the bottom of his B/L feet\par Ball of the feet\par Long standing  for over two years. \par last A1c over 8% \par - Use of

## 2022-09-20 NOTE — H&P ADULT - ASSESSMENT
Good exam       Continue with current medications    Obtain labs in near future. - QUest      Recheck in 6 months. Plan Endoscopic evaluation with intervention as needed under anesthesia

## 2022-09-20 NOTE — PHYSICAL EXAM
[Ankle Swelling (On Exam)] : present [Ankle Swelling Bilaterally] : bilaterally  [Normal Foot/Ankle] : Both lower extremities were exposed and visualized. Standing exam demonstrates normal foot posture and alignment. Hindfoot exam shows no hindfoot valgus or varus [Sensation] : the sensory exam was normal to light touch and pinprick [Varicose Veins Of Lower Extremities] : present [Ankle Swelling On The Right] : mild [2+] : left foot dorsalis pedis 2+ [] : not present [Delayed in the Right Toes] : capillary refills normal in right toes [Delayed in the Left Toes] : capillary refills normal in the left toes [de-identified] : Rectus feet B/L. Pain on palpation of the ball of his feet and dorsiflexion while WB B/L. Ankle Equinus B/L.  [FreeTextEntry1] : Scaly Skin B/L feet  [Vibration Dec.] : normal vibratory sensation at the level of the toes [Position Sense Dec.] : normal position sense at the level of the toes

## 2022-09-22 DIAGNOSIS — M79.672 PAIN IN LEFT FOOT: ICD-10-CM

## 2022-09-22 DIAGNOSIS — M79.671 PAIN IN RIGHT FOOT: ICD-10-CM

## 2022-09-22 DIAGNOSIS — L85.3 XEROSIS CUTIS: ICD-10-CM

## 2022-09-22 DIAGNOSIS — M77.41 METATARSALGIA, RIGHT FOOT: ICD-10-CM

## 2022-09-22 DIAGNOSIS — E11.9 TYPE 2 DIABETES MELLITUS WITHOUT COMPLICATIONS: ICD-10-CM

## 2022-10-26 ENCOUNTER — NON-APPOINTMENT (OUTPATIENT)
Age: 57
End: 2022-10-26

## 2022-11-01 ENCOUNTER — OUTPATIENT (OUTPATIENT)
Dept: OUTPATIENT SERVICES | Facility: HOSPITAL | Age: 57
LOS: 1 days | Discharge: HOME | End: 2022-11-01

## 2022-11-01 ENCOUNTER — APPOINTMENT (OUTPATIENT)
Dept: PODIATRY | Facility: CLINIC | Age: 57
End: 2022-11-01

## 2022-11-01 DIAGNOSIS — E11.9 TYPE 2 DIABETES MELLITUS WITHOUT COMPLICATIONS: ICD-10-CM

## 2022-11-01 DIAGNOSIS — M79.672 PAIN IN LEFT FOOT: ICD-10-CM

## 2022-11-01 DIAGNOSIS — M79.671 PAIN IN RIGHT FOOT: ICD-10-CM

## 2022-11-01 DIAGNOSIS — M77.41 METATARSALGIA, RIGHT FOOT: ICD-10-CM

## 2022-11-01 DIAGNOSIS — E11.42 TYPE 2 DIABETES MELLITUS WITH DIABETIC POLYNEUROPATHY: ICD-10-CM

## 2022-11-01 DIAGNOSIS — L85.3 XEROSIS CUTIS: ICD-10-CM

## 2022-11-01 DIAGNOSIS — M77.42 METATARSALGIA, RIGHT FOOT: ICD-10-CM

## 2022-11-01 DIAGNOSIS — L74.513 PRIMARY FOCAL HYPERHIDROSIS, SOLES: ICD-10-CM

## 2022-11-01 DIAGNOSIS — G89.29 PAIN IN LEFT FOOT: ICD-10-CM

## 2022-11-01 DIAGNOSIS — G89.29 PAIN IN RIGHT FOOT: ICD-10-CM

## 2022-11-01 PROCEDURE — 99213 OFFICE O/P EST LOW 20 MIN: CPT

## 2022-11-01 NOTE — ASSESSMENT
[FreeTextEntry1] : Diabetic Foot Risk Assessment\par - Educated on proepr foot care and shoe wear\par - Educated to lower his BG and A1c \par B/L foot pain - biomechanics and or elevated A1c are contributors (Neuropathic pain could not be excluded) \par - Cont OTC orthotics with met pad to alleviate Ball of the foot pain \par - instructed how to use  Cream for pain and scaly skin\par Sweating and malodor of B/L feet\par - Lysol shoes\par - OTC foot spray \par RTO  6 weeks will re-eval - will consider PO meds for neuropathy and/or xray \par nail and callus dbx on the next visit \par  [Verbal] : verbal [Patient] : patient [Good - alert, interested, motivated] : Good - alert, interested, motivated

## 2022-11-01 NOTE — PHYSICAL EXAM
[Ankle Swelling (On Exam)] : present [Ankle Swelling Bilaterally] : bilaterally  [Varicose Veins Of Lower Extremities] : bilaterally [Ankle Swelling On The Right] : mild [] : on both lower extremities [Delayed in the Right Toes] : capillary refills normal in right toes [Delayed in the Left Toes] : capillary refills normal in the left toes [2+] : left foot dorsalis pedis 2+ [Normal Foot/Ankle] : Both lower extremities were exposed and visualized. Standing exam demonstrates normal foot posture and alignment. Hindfoot exam shows no hindfoot valgus or varus [de-identified] : Rectus feet B/L. Pain on palpation of the ball of his feet and dorsiflexion while WB B/L. Ankle Equinus B/L.  [FreeTextEntry1] : Scaly Skin B/L feet  [Sensation] : the sensory exam was normal to light touch and pinprick [Vibration Dec.] : normal vibratory sensation at the level of the toes [Position Sense Dec.] : normal position sense at the level of the toes

## 2022-11-01 NOTE — HISTORY OF PRESENT ILLNESS
[FreeTextEntry1] : Diabetic Foot evaluation.\par Pain/Burning sensation to the bottom of his B/L feet\par Ball of the feet\par Long standing  for over two years. \par got his creams but he has not used them because he does not know how to\par Got orthotics 0 he did not bring them with him. forgot at home - help a little\par last A1c over 8% - does not check his BG regularly \par Complaining of swelling a lot from his feet and malodor \par - Use of  - Yadira

## 2022-11-01 NOTE — REASON FOR VISIT
[Follow-Up Visit] : a follow-up visit for [Foot Pain] : foot pain [Other: ______] : provided by DONNA [Time Spent: ____ minutes] : Total time spent using  services: [unfilled] minutes. The patient's primary language is not English thus required  services. [FreeTextEntry2] : B/L Foot Pain [Interpreters_FullName] : Yadira

## 2022-11-04 ENCOUNTER — RESULT REVIEW (OUTPATIENT)
Age: 57
End: 2022-11-04

## 2022-11-04 ENCOUNTER — OUTPATIENT (OUTPATIENT)
Dept: OUTPATIENT SERVICES | Facility: HOSPITAL | Age: 57
LOS: 1 days | Discharge: HOME | End: 2022-11-04

## 2022-11-04 ENCOUNTER — APPOINTMENT (OUTPATIENT)
Dept: GASTROENTEROLOGY | Facility: CLINIC | Age: 57
End: 2022-11-04

## 2022-11-04 VITALS
HEART RATE: 76 BPM | TEMPERATURE: 98 F | BODY MASS INDEX: 30.96 KG/M2 | WEIGHT: 209 LBS | HEIGHT: 69 IN | SYSTOLIC BLOOD PRESSURE: 108 MMHG | OXYGEN SATURATION: 98 % | DIASTOLIC BLOOD PRESSURE: 68 MMHG

## 2022-11-04 DIAGNOSIS — K74.60 UNSPECIFIED CIRRHOSIS OF LIVER: ICD-10-CM

## 2022-11-04 PROCEDURE — 74183 MRI ABD W/O CNTR FLWD CNTR: CPT | Mod: 26

## 2022-11-04 PROCEDURE — 99213 OFFICE O/P EST LOW 20 MIN: CPT | Mod: GC

## 2022-11-04 NOTE — END OF VISIT
[] : Fellow [Time Spent: ___ minutes] : I have spent [unfilled] minutes of time on the encounter. 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 Still drinking alcohol. Patient cannot recall if took hepatitis C treatment. \par He picked a month supply of Epclusa in May but did not follow up after that. \par Abdomen soft non tender \par EGD 2021 showed small varices and started on NSB. On propranolol to 20 mg BID. No need for repeat EGD.\par No ascites on exam and last imaging.\par HCC screening. Hx of HCC with TACE in 2016. Awaiting liver protocol MRI and AFP. No need for CT chest as > 5 years. \par Did not complete hepatitis C treatment. Took only 4 weeks per pharmacy but patient has no recollection. Will recheck HCV titers and if still elevated will plan to re-treat.\par Prior Hep B exposure. Will check HBV DNA if HCV cleared\par Will need vaccination for hepatitis A on follow up\par Will need repeat colonoscopy arranged\par f/u in 1 month

## 2022-11-04 NOTE — PHYSICAL EXAM
[General Appearance - Alert] : alert [General Appearance - In No Acute Distress] : in no acute distress [General Appearance - Well Nourished] : well nourished [Sclera] : the sclera and conjunctiva were normal [Outer Ear] : the ears and nose were normal in appearance [Hearing Threshold Finger Rub Not [place]] : hearing was normal [Neck Appearance] : the appearance of the neck was normal [Jugular Venous Distention Increased] : there was no jugular-venous distention [Heart Rate And Rhythm] : heart rate was normal and rhythm regular [Heart Sounds] : normal S1 and S2 [Edema] : there was no peripheral edema [Abdomen Soft] : soft [Abdomen Tenderness] : non-tender [Abdomen Mass (___ Cm)] : no abdominal mass palpated [No Spinal Tenderness] : no spinal tenderness [Abnormal Walk] : normal gait [Musculoskeletal - Swelling] : no joint swelling seen [Skin Color & Pigmentation] : normal skin color and pigmentation [] : no rash [Cranial Nerves] : cranial nerves 2-12 were intact [Affect] : the affect was normal [Scleral Icterus] : No Scleral Icterus [Spider Angioma] : No spider angioma(s) were observed [Abdominal  Ascites] : no ascites [Asterixis] : no asterixis observed [Jaundice] : No jaundice [Palmar Erythema] : no Palmar Erythema [Depression] : no depression [Hallucinations] : ~T no ~M hallucinations

## 2022-11-04 NOTE — HISTORY OF PRESENT ILLNESS
[Fatigue] : denies fatigue [Malaise] : denies malaise [Fever] : denies fever [Diffuse Joint Pain (Arthralgias)] : denies arthralgias [Muscle Aches, Generalized (Myalgias)] : denies myalgias [Yellow Skin Or Eyes (Jaundice)] : denies jaundice [Abdominal Pain] : denies abdominal pain [Urine Tests Nonspecific Abnormal Findings Biliuria] : denies dark urine [Light Colored Bowel Movement (Acholic Stools)] : denies pale stools [Insomnia] : denies insomnia [Skin: Rash] : denies rash [Itching (Pruritus)] : denies pruritus [Shortness Of Breath] : denies shortness of breath [de-identified] : : Yadira\par 57 years old male known to have 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 Additionally patient has been drinking EtOH , 2-3 shot of vodka every night.\par \par Pt states he is doing ok. The pt denies any nausea, vomiting, abdominal pain, abdominal distension, diarrhea, constipation, change in the color of stool or urine, recent change in appetite or weight. Pt was recently taken off lipitor by the PMD for transaminitis.\par \par Past EGD: 2017 by Dr Velez: showed small esophageal varices, hypertensive portal gastropathy in the antrum and body of stomach, mild gastitis.\par \par Colonoscopy: 2017 by Dr Velez, single polyp in the sigmoid colon and rectum and mild diverticulosis. Patho showed tubular adenoma (sigmoid) and hyperplastic polyp (rectum)

## 2022-11-04 NOTE — ASSESSMENT
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 No new lesions.\par Stable cirrhosis and splenomegaly/varices.\par - Other CLD w/u neg besides ESTELLE (1:640), Anti-Smooth muscle  Ab (1:20)\par - no need for repeat EGD -->  c/w propranolol 20 mg BID \par - Hep A vaccine first dose was in Nov 2021, administered 2nd dose last visit\par - Hep A neg, Hep B Core IgG Pos w/ Neg Ab and Ag from 2016 indicating prior exposure\par - HCV PCR: 277 on feb 2022--> as per pharmacy note he picked up the HCV treatment on May 2022--> patient cant recall if he took treatment\par - AFP: 2/15/22: <1.8\par \par Rec:\par - start to cut down on EtOH drinking--> discussed the abstinence but he has family issue at home so cant stop drinking\par - Awaiting MRI liver authorization\par - will check HCV PCR\par - Monitor LFts/INR\par -c/w propranolol 20 mg BID \par \par \par # Colon polyps\par - Sigmoid colon: tubular adenoma, fu colonoscopy this year\par - Rectal polyp: hyperplastic polyp\par - follow up with GI for repeat colonsocopy\par \par \par \par

## 2022-11-04 NOTE — END OF VISIT
[] : Fellow [FreeTextEntry3] : SP CRC screening colonosocpy: HRA (multiple adenomas >2) next surveillance in 3 years. Follow with hepatology for adequate screening and MGMT for his decompensated cirrhosis.

## 2022-11-04 NOTE — HISTORY OF PRESENT ILLNESS
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 Pt has no active issues at this time. Denies N/V/D, fever, chills, sob, dizziness, confusion, red blood per rectum or mouth, abdominal distention, dark urine or acholic stools

## 2022-11-04 NOTE — PHYSICAL EXAM
[Alert] : alert [Normal Voice/Communication] : normal voice/communication [Healthy Appearing] : healthy appearing [No Acute Distress] : no acute distress [Sclera] : the sclera and conjunctiva were normal [Normal Appearance] : the appearance of the neck was normal [No Neck Mass] : no neck mass was observed [No Respiratory Distress] : no respiratory distress [No Acc Muscle Use] : no accessory muscle use [Respiration, Rhythm And Depth] : normal respiratory rhythm and effort [Auscultation Breath Sounds / Voice Sounds] : lungs were clear to auscultation bilaterally [Heart Rate And Rhythm] : heart rate was normal and rhythm regular [Normal S1, S2] : normal S1 and S2 [Murmurs] : no murmurs [Bowel Sounds] : normal bowel sounds [Abdomen Tenderness] : non-tender [No Masses] : no abdominal mass palpated [Abdomen Soft] : soft [] : no hepatosplenomegaly [No CVA Tenderness] : no CVA  tenderness

## 2022-11-04 NOTE — ASSESSMENT
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 \par \par #Hx of Polyps\par - CF (9/14/22): good prep BBPS 6. 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
Head Injury (Pediatric)

## 2022-11-09 DIAGNOSIS — K74.60 UNSPECIFIED CIRRHOSIS OF LIVER: ICD-10-CM

## 2022-11-10 ENCOUNTER — NON-APPOINTMENT (OUTPATIENT)
Age: 57
End: 2022-11-10

## 2022-12-02 ENCOUNTER — OUTPATIENT (OUTPATIENT)
Dept: OUTPATIENT SERVICES | Facility: HOSPITAL | Age: 57
LOS: 1 days | Discharge: HOME | End: 2022-12-02

## 2022-12-02 ENCOUNTER — LABORATORY RESULT (OUTPATIENT)
Age: 57
End: 2022-12-02

## 2022-12-02 ENCOUNTER — APPOINTMENT (OUTPATIENT)
Dept: INTERNAL MEDICINE | Facility: CLINIC | Age: 57
End: 2022-12-02

## 2022-12-02 VITALS — HEART RATE: 71 BPM | SYSTOLIC BLOOD PRESSURE: 134 MMHG | DIASTOLIC BLOOD PRESSURE: 74 MMHG

## 2022-12-02 RX ORDER — POLYETHYLENE GLYCOL 3350 AND ELECTROLYTES WITH LEMON FLAVOR 236; 22.74; 6.74; 5.86; 2.97 G/4L; G/4L; G/4L; G/4L; G/4L
236 POWDER, FOR SOLUTION ORAL
Qty: 1 | Refills: 0 | Status: COMPLETED | COMMUNITY
Start: 2022-06-27 | End: 2022-12-02

## 2022-12-02 RX ORDER — NICOTINE TRANSDERMAL SYSTEM 14 MG/24H
14 PATCH, EXTENDED RELEASE TRANSDERMAL DAILY
Qty: 14 | Refills: 0 | Status: COMPLETED | COMMUNITY
Start: 2022-06-01 | End: 2022-12-02

## 2022-12-02 RX ORDER — NICOTINE TRANSDERMAL SYSTEM 21 MG/24H
21 PATCH, EXTENDED RELEASE TRANSDERMAL DAILY
Qty: 42 | Refills: 0 | Status: COMPLETED | COMMUNITY
Start: 2022-06-01 | End: 2022-12-02

## 2022-12-02 RX ORDER — NICOTINE TRANSDERMAL SYSTEM 7 MG/24H
7 PATCH, EXTENDED RELEASE TRANSDERMAL DAILY
Qty: 14 | Refills: 0 | Status: COMPLETED | COMMUNITY
Start: 2022-06-01 | End: 2022-12-02

## 2022-12-13 ENCOUNTER — OUTPATIENT (OUTPATIENT)
Dept: OUTPATIENT SERVICES | Facility: HOSPITAL | Age: 57
LOS: 1 days | Discharge: HOME | End: 2022-12-13

## 2022-12-13 ENCOUNTER — APPOINTMENT (OUTPATIENT)
Dept: PODIATRY | Facility: CLINIC | Age: 57
End: 2022-12-13

## 2022-12-13 DIAGNOSIS — L84 CORNS AND CALLOSITIES: ICD-10-CM

## 2022-12-13 DIAGNOSIS — L85.3 XEROSIS CUTIS: ICD-10-CM

## 2022-12-13 PROCEDURE — 99213 OFFICE O/P EST LOW 20 MIN: CPT | Mod: 25

## 2022-12-13 PROCEDURE — 11056 PARNG/CUTG B9 HYPRKR LES 2-4: CPT

## 2022-12-13 NOTE — ASSESSMENT
[Verbal] : verbal [Patient] : patient [Good - alert, interested, motivated] : Good - alert, interested, motivated [Demonstrates independently] : demonstrates independently [FreeTextEntry1] : Assessment:\par -Diabetes mellitus\par -R hallux callus, L 2nd digit callus\par \par Plan:\par - Pt seen & evaluated\par - Educated on proper foot care and shoe wear\par - Educated to lower his BG and A1c \par - Aseptic dbx calluses b/l  x2 with dermal curette to normotrophic tissue; pt reports improvement in sx\par - B/L foot pain - biomechanics and/or elevated A1c are contributors  \par - Cont OTC orthotics with met pad to alleviate Ball of the foot pain \par - Sweating and malodor of B/L feet\par - Lysol shoes\par - OTC foot spray \par - Pt can purchase tennis shoes w/breathable fabric to help with sweating\par - RTC 3 months for regular callus & nail care

## 2022-12-13 NOTE — REASON FOR VISIT
[Follow-Up Visit] : a follow-up visit for [Foot Pain] : foot pain [Other: ______] : provided by DONNA [FreeTextEntry2] : B/L Foot Pain [Interpreters_FullName] : Yadira

## 2022-12-13 NOTE — HISTORY OF PRESENT ILLNESS
[Sneakers] : cari [FreeTextEntry1] : Diabetic Foot evaluation.\par Pain/Burning sensation to the bottom of his B/L feet\par Ball of the feet\par Long standing for over two years. \par Got his creams, states have helped a little \par Got orthotics - he did not bring them with him. Forgot at home - help a little\par Last A1c over 8% - does not check his BG regularly \par Complaining of swelling a lot from his feet and malodor \par Reports tingling sensation to Left 2nd digit\par - Use of  - Yadira

## 2022-12-13 NOTE — PHYSICAL EXAM
[Ankle Swelling Bilaterally] : bilaterally  [Ankle Swelling On The Right] : mild [2+] : left foot dorsalis pedis 2+ [Normal Foot/Ankle] : Both lower extremities were exposed and visualized. Standing exam demonstrates normal foot posture and alignment. Hindfoot exam shows no hindfoot valgus or varus [Sensation] : the sensory exam was normal to light touch and pinprick [Ankle Swelling (On Exam)] : not present [Varicose Veins Of Lower Extremities] : not present [] : not present [Delayed in the Right Toes] : capillary refills normal in right toes [Delayed in the Left Toes] : capillary refills normal in the left toes [de-identified] : Rectus feet B/L. Pain on palpation of the ball of his feet and dorsiflexion while WB B/L. Ankle Equinus B/L.  [Foot Ulcer] : no foot ulcer [Skin Induration] : no skin induration [FreeTextEntry1] : Scaly Skin B/L feet \par Calluses: R medial hallux IPJ; L distal 2nd digit [Vibration Dec.] : diminished vibratory sensation at the level of the toes

## 2022-12-19 ENCOUNTER — APPOINTMENT (OUTPATIENT)
Dept: HEPATOLOGY | Facility: CLINIC | Age: 57
End: 2022-12-19

## 2022-12-19 ENCOUNTER — OUTPATIENT (OUTPATIENT)
Dept: OUTPATIENT SERVICES | Facility: HOSPITAL | Age: 57
LOS: 1 days | Discharge: HOME | End: 2022-12-19

## 2022-12-19 VITALS
TEMPERATURE: 96.6 F | HEART RATE: 77 BPM | DIASTOLIC BLOOD PRESSURE: 87 MMHG | HEIGHT: 69 IN | SYSTOLIC BLOOD PRESSURE: 134 MMHG | OXYGEN SATURATION: 96 %

## 2022-12-19 DIAGNOSIS — L84 CORNS AND CALLOSITIES: ICD-10-CM

## 2022-12-19 DIAGNOSIS — L85.3 XEROSIS CUTIS: ICD-10-CM

## 2022-12-19 DIAGNOSIS — E11.42 TYPE 2 DIABETES MELLITUS WITH DIABETIC POLYNEUROPATHY: ICD-10-CM

## 2022-12-19 PROCEDURE — 99215 OFFICE O/P EST HI 40 MIN: CPT | Mod: GC

## 2022-12-19 NOTE — PHYSICAL EXAM
[General Appearance - Alert] : alert [General Appearance - In No Acute Distress] : in no acute distress [Sclera] : the sclera and conjunctiva were normal [Outer Ear] : the ears and nose were normal in appearance [Neck Appearance] : the appearance of the neck was normal [] : no respiratory distress [Heart Sounds] : normal S1 and S2 [Edema] : there was no peripheral edema [Bowel Sounds] : normal bowel sounds [Abdomen Soft] : soft [Abdomen Tenderness] : non-tender [No CVA Tenderness] : no ~M costovertebral angle tenderness [Abnormal Walk] : normal gait [No Focal Deficits] : no focal deficits [Oriented To Time, Place, And Person] : oriented to person, place, and time [Affect] : the affect was normal [Scleral Icterus] : No Scleral Icterus [Spider Angioma] : No spider angioma(s) were observed [Abdominal  Ascites] : no ascites [Asterixis] : no asterixis observed [Jaundice] : No jaundice [Depression] : no depression [Delusions] : no ~T delusions

## 2022-12-19 NOTE — END OF VISIT
[] : Fellow [FreeTextEntry3] : 57  year old male of [place] origin with speech impairment HCV SVR 12 after 4 weeks of Epclusa, compensated cirrhosis EV HCC with TARE complicated by celiac artery dissection in 2016 seen in follow up as imaging showing new HCC. \par He has no symptoms of decompensation. Still drinking alcohol. but reduced intake. \par Patient took Epclusa in May but did not follow up after that. \par Abdomen soft non tender \par EGD 2021 showed small varices and started on NSB. On propranolol to 20 mg BID. No need for repeat EGD.\par No ascites on exam and last imaging.\par Patient only took 4 weeks of Epclusa and although did not complete hepatitis C treatment but achieved SVR 12. HCV RNA undetectable in in December. Prior Hep B exposure. Will check HBV DNA \par Hepatitis A - has immunity\par Colonoscopy - multiple polyps and repeat in 3 - 5 years\par f/u in 3 months\par \par HCC screening. Hx of HCC with TACE in 2016. MRI showed segment 5 1.8 cm lesion and AFP was normal. \par Patient is Child A but has portal HTN discussed in liver MDT and IR referral for ablation advised. \par Will check  CT chest and bone scan as well. \par  [Time Spent: ___ minutes] : I have spent [unfilled] minutes of time on the encounter.

## 2022-12-19 NOTE — REVIEW OF SYSTEMS
[Loss Of Hearing] : hearing loss [Fever] : no fever [Chills] : no chills [Eye Pain] : no eye pain [Chest Pain] : no chest pain [Palpitations] : no palpitations [Cough] : no cough [SOB on Exertion] : no shortness of breath during exertion [Abdominal Pain] : no abdominal pain [Vomiting] : no vomiting [Constipation] : no constipation [Diarrhea] : no diarrhea [Limb Pain] : no limb pain [Itching] : no itching [Change In A Mole] : no change in a mole [Dizziness] : no dizziness [Fainting] : no fainting [Anxiety] : no anxiety [Depression] : no depression [Muscle Weakness] : no muscle weakness [Swollen Glands] : no swollen glands

## 2022-12-19 NOTE — ASSESSMENT
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 No new lesions.\par Stable cirrhosis and splenomegaly/varices.\par - Other CLD w/u neg besides ESTELLE (1:640), Anti-Smooth muscle Ab (1:20)\par - no need for repeat EGD --> c/w propranolol 20 mg BID \par - Hep A vaccines/p two doses \par - Hep A neg, Hep B Core IgG Pos w/ Neg Ab and Ag from 2016 indicating prior exposure\par - Hep A IgG ab reactive 9/27/22\par - HCV PCR: 277 on feb 2022--received ttt on may only \par - HCV PCR negative 9/27/22 and 12/2022 \par - AFP: 2/15/22: <1.8, 8/27/22: <1.8\par - INR 1.02 9/27/22\par - repeat MR discussed in tumor board: new 1.8 cm liver mass \par \par Rec:\par - start to cut down on EtOH drinking\par - Monitor LFts/INR/CBC\par - c/w propranolol 20 mg BID \par - repeat Hep B DNA , and Hep c panel \par - IR referral for ablation \par - repeat AFP before next visit\par - follow up in three months \par \par # h/o colon polyps s/p surveillance colonoscopy \par f/u with general GI

## 2022-12-19 NOTE — HISTORY OF PRESENT ILLNESS
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 Additionally patient has been drinking EtOH, 3-4 drinks over 2 hours every other day. \par \par Pt states he is doing ok. The pt denies any nausea, vomiting, abdominal pain, abdominal distension, diarrhea, constipation, change in the color of stool or urine, recent change in appetite or weight.\par \par Past EGD: 2021 w Dr Velez reveled small EV and patient has been on Propranolol 20 mg BID. \par \par last colonoscopy in 2021: one small TA. \par \par Patient had MRI in 11/2022 which showed new 1.8 mass in the liver, case was discussed in tumor board. \par

## 2023-01-05 RX ORDER — PROPRANOLOL HYDROCHLORIDE 20 MG/1
20 TABLET ORAL TWICE DAILY
Qty: 180 | Refills: 0 | Status: ACTIVE | COMMUNITY
Start: 2022-01-31 | End: 1900-01-01

## 2023-01-05 RX ORDER — LISINOPRIL 2.5 MG/1
2.5 TABLET ORAL DAILY
Qty: 90 | Refills: 0 | Status: ACTIVE | COMMUNITY
Start: 2022-06-01 | End: 1900-01-01

## 2023-01-05 RX ORDER — EMPAGLIFLOZIN 10 MG/1
10 TABLET, FILM COATED ORAL
Qty: 90 | Refills: 0 | Status: ACTIVE | COMMUNITY
Start: 2022-06-01 | End: 1900-01-01

## 2023-01-05 RX ORDER — METFORMIN ER 750 MG 750 MG/1
750 TABLET ORAL DAILY
Qty: 90 | Refills: 0 | Status: ACTIVE | COMMUNITY
Start: 2019-12-17 | End: 1900-01-01

## 2023-01-05 RX ORDER — BLOOD SUGAR DIAGNOSTIC
STRIP MISCELLANEOUS
Qty: 100 | Refills: 0 | Status: ACTIVE | COMMUNITY
Start: 2022-06-27 | End: 1900-01-01

## 2023-01-09 ENCOUNTER — APPOINTMENT (OUTPATIENT)
Dept: HEPATOLOGY | Facility: CLINIC | Age: 58
End: 2023-01-09

## 2023-01-09 RX ORDER — LANCETS 33 GAUGE
EACH MISCELLANEOUS
Qty: 1 | Refills: 3 | Status: ACTIVE | COMMUNITY
Start: 2022-06-27

## 2023-01-09 RX ORDER — ISOPROPYL ALCOHOL 0.7 ML/ML
SWAB TOPICAL
Qty: 1 | Refills: 3 | Status: ACTIVE | COMMUNITY
Start: 2022-06-27

## 2023-01-13 ENCOUNTER — OUTPATIENT (OUTPATIENT)
Dept: OUTPATIENT SERVICES | Facility: HOSPITAL | Age: 58
LOS: 1 days | Discharge: HOME | End: 2023-01-13
Payer: MEDICAID

## 2023-01-13 ENCOUNTER — NON-APPOINTMENT (OUTPATIENT)
Age: 58
End: 2023-01-13

## 2023-01-13 ENCOUNTER — APPOINTMENT (OUTPATIENT)
Dept: INTERVENTIONAL RADIOLOGY/VASCULAR | Facility: CLINIC | Age: 58
End: 2023-01-13
Payer: MEDICAID

## 2023-01-13 VITALS
BODY MASS INDEX: 30.96 KG/M2 | HEIGHT: 69 IN | WEIGHT: 209 LBS | DIASTOLIC BLOOD PRESSURE: 87 MMHG | SYSTOLIC BLOOD PRESSURE: 128 MMHG | TEMPERATURE: 98.6 F | HEART RATE: 92 BPM

## 2023-01-13 VITALS
HEART RATE: 74 BPM | OXYGEN SATURATION: 100 % | WEIGHT: 212.97 LBS | TEMPERATURE: 98 F | DIASTOLIC BLOOD PRESSURE: 78 MMHG | SYSTOLIC BLOOD PRESSURE: 119 MMHG | RESPIRATION RATE: 20 BRPM | HEIGHT: 69 IN

## 2023-01-13 DIAGNOSIS — C22.0 LIVER CELL CARCINOMA: ICD-10-CM

## 2023-01-13 DIAGNOSIS — Z01.818 ENCOUNTER FOR OTHER PREPROCEDURAL EXAMINATION: ICD-10-CM

## 2023-01-13 LAB
A1C WITH ESTIMATED AVERAGE GLUCOSE RESULT: 6.9 % — HIGH (ref 4–5.6)
ALBUMIN SERPL ELPH-MCNC: 4 G/DL — SIGNIFICANT CHANGE UP (ref 3.5–5.2)
ALP SERPL-CCNC: 159 U/L — HIGH (ref 30–115)
ALT FLD-CCNC: 79 U/L — HIGH (ref 0–41)
ANION GAP SERPL CALC-SCNC: 14 MMOL/L — SIGNIFICANT CHANGE UP (ref 7–14)
APTT BLD: 35.8 SEC — SIGNIFICANT CHANGE UP (ref 27–39.2)
AST SERPL-CCNC: 74 U/L — HIGH (ref 0–41)
BASOPHILS # BLD AUTO: 0.04 K/UL — SIGNIFICANT CHANGE UP (ref 0–0.2)
BASOPHILS NFR BLD AUTO: 0.7 % — SIGNIFICANT CHANGE UP (ref 0–1)
BILIRUB SERPL-MCNC: 0.8 MG/DL — SIGNIFICANT CHANGE UP (ref 0.2–1.2)
BUN SERPL-MCNC: 13 MG/DL — SIGNIFICANT CHANGE UP (ref 10–20)
CALCIUM SERPL-MCNC: 9.3 MG/DL — SIGNIFICANT CHANGE UP (ref 8.4–10.5)
CHLORIDE SERPL-SCNC: 105 MMOL/L — SIGNIFICANT CHANGE UP (ref 98–110)
CO2 SERPL-SCNC: 21 MMOL/L — SIGNIFICANT CHANGE UP (ref 17–32)
CREAT SERPL-MCNC: 0.6 MG/DL — LOW (ref 0.7–1.5)
EGFR: 113 ML/MIN/1.73M2 — SIGNIFICANT CHANGE UP
EOSINOPHIL # BLD AUTO: 0.15 K/UL — SIGNIFICANT CHANGE UP (ref 0–0.7)
EOSINOPHIL NFR BLD AUTO: 2.4 % — SIGNIFICANT CHANGE UP (ref 0–8)
ESTIMATED AVERAGE GLUCOSE: 151 MG/DL — HIGH (ref 68–114)
GLUCOSE SERPL-MCNC: 231 MG/DL — HIGH (ref 70–99)
HCT VFR BLD CALC: 46.1 % — SIGNIFICANT CHANGE UP (ref 42–52)
HGB BLD-MCNC: 16.5 G/DL — SIGNIFICANT CHANGE UP (ref 14–18)
IMM GRANULOCYTES NFR BLD AUTO: 0.3 % — SIGNIFICANT CHANGE UP (ref 0.1–0.3)
INR BLD: 1.02 RATIO — SIGNIFICANT CHANGE UP (ref 0.65–1.3)
LYMPHOCYTES # BLD AUTO: 1.54 K/UL — SIGNIFICANT CHANGE UP (ref 1.2–3.4)
LYMPHOCYTES # BLD AUTO: 25 % — SIGNIFICANT CHANGE UP (ref 20.5–51.1)
MCHC RBC-ENTMCNC: 34.4 PG — HIGH (ref 27–31)
MCHC RBC-ENTMCNC: 35.8 G/DL — SIGNIFICANT CHANGE UP (ref 32–37)
MCV RBC AUTO: 96.2 FL — HIGH (ref 80–94)
MONOCYTES # BLD AUTO: 0.55 K/UL — SIGNIFICANT CHANGE UP (ref 0.1–0.6)
MONOCYTES NFR BLD AUTO: 8.9 % — SIGNIFICANT CHANGE UP (ref 1.7–9.3)
NEUTROPHILS # BLD AUTO: 3.85 K/UL — SIGNIFICANT CHANGE UP (ref 1.4–6.5)
NEUTROPHILS NFR BLD AUTO: 62.7 % — SIGNIFICANT CHANGE UP (ref 42.2–75.2)
NRBC # BLD: 0 /100 WBCS — SIGNIFICANT CHANGE UP (ref 0–0)
PLATELET # BLD AUTO: 90 K/UL — LOW (ref 130–400)
POTASSIUM SERPL-MCNC: 4 MMOL/L — SIGNIFICANT CHANGE UP (ref 3.5–5)
POTASSIUM SERPL-SCNC: 4 MMOL/L — SIGNIFICANT CHANGE UP (ref 3.5–5)
PROT SERPL-MCNC: 7 G/DL — SIGNIFICANT CHANGE UP (ref 6–8)
PROTHROM AB SERPL-ACNC: 11.6 SEC — SIGNIFICANT CHANGE UP (ref 9.95–12.87)
RBC # BLD: 4.79 M/UL — SIGNIFICANT CHANGE UP (ref 4.7–6.1)
RBC # FLD: 13.3 % — SIGNIFICANT CHANGE UP (ref 11.5–14.5)
SODIUM SERPL-SCNC: 140 MMOL/L — SIGNIFICANT CHANGE UP (ref 135–146)
WBC # BLD: 6.15 K/UL — SIGNIFICANT CHANGE UP (ref 4.8–10.8)
WBC # FLD AUTO: 6.15 K/UL — SIGNIFICANT CHANGE UP (ref 4.8–10.8)

## 2023-01-13 PROCEDURE — 99204 OFFICE O/P NEW MOD 45 MIN: CPT

## 2023-01-13 PROCEDURE — 93010 ELECTROCARDIOGRAM REPORT: CPT

## 2023-01-13 NOTE — HISTORY OF PRESENT ILLNESS
[FreeTextEntry1] : 57 year-old male with history of Hep C/cirrhosis, HCC treated with Y-90 in the past and now a new lesion measuring 1.8 cm in segment 4 (LR-4).  He is no longer following with the transplant team because it is too difficult for him to drive there.  Here today to discuss ablation of his liver lesion.

## 2023-01-13 NOTE — PHYSICAL EXAM
[No Respiratory Distress] : no respiratory distress [Not Tender] : non-tender [Soft] : abdomen soft [Restricted in physically strenuous activity but ambulatory and able to carry out work of a light or sedentary nature] : Restricted in physically strenuous activity but ambulatory and able to carry out work of a light or sedentary nature, e.g., light house work, office work

## 2023-01-13 NOTE — H&P PST ADULT - HISTORY OF PRESENT ILLNESS
58 yo m here for past. pt sched for liver ablation to be done in IR on 1/23/2023    Pt reports no cardiopulmonary issues denies sob/cheung/cp/palpitations. Pt states no recent infections no fever no cough no uti uri. Stated exercise tolerance is  1-2   flights no changes. Dario screen revd.  pt verbalized and understanding of instructions  given and all concerns and questions asked and answered.  Pt denies any s/s covid 19 and reports no contact with known positive people. Pt has appointment for repeat covid testing pre op and instructed to continue to self monitor and report any concerns to MD. Pt will continue to practice self isolation and  exposure control measures pre op      AT BEDSIDE  Anesthesia Alert  NO--Difficult Airway  NO--History of neck surgery or radiation  NO--Limited ROM of neck  NO--History of Malignant hyperthermia  NO--No personal or family history of Pseudocholinesterase deficiency.  NO--Prior Anesthesia Complication  NO--Latex Allergy  NO--Loose teeth  NO--History of Rheumatoid Arthritis  NO--DARIO  NO--Other_____  written and verbal instructions with teach back on chlorhexidine shampoo provided,  pt verbalized understanding with returned demonstration  PT DENIES ANY RASHES, ABRASION, OR OPEN WOUNDS OR CUTS  denies travel outside the [place] in the past 30 days

## 2023-01-13 NOTE — ASSESSMENT
[FreeTextEntry1] : 57 year-old male with a 1.8 cm LR-4 lesion in segment 4.\par \par Plan:\par \par Will schedule for percutaneous ablation.\par \par Risks, benefits, and alternatives of the procedure, including but not limited to bleeding, infection, and damage to adjacent structures, discussed at length with the patient and he would like to proceed.

## 2023-01-20 ENCOUNTER — LABORATORY RESULT (OUTPATIENT)
Age: 58
End: 2023-01-20

## 2023-01-23 ENCOUNTER — TRANSCRIPTION ENCOUNTER (OUTPATIENT)
Age: 58
End: 2023-01-23

## 2023-01-23 ENCOUNTER — OUTPATIENT (OUTPATIENT)
Dept: OUTPATIENT SERVICES | Facility: HOSPITAL | Age: 58
LOS: 1 days | Discharge: HOME | End: 2023-01-23
Payer: MEDICAID

## 2023-01-23 ENCOUNTER — RESULT REVIEW (OUTPATIENT)
Age: 58
End: 2023-01-23

## 2023-01-23 VITALS
HEART RATE: 87 BPM | SYSTOLIC BLOOD PRESSURE: 142 MMHG | OXYGEN SATURATION: 97 % | DIASTOLIC BLOOD PRESSURE: 71 MMHG | RESPIRATION RATE: 18 BRPM

## 2023-01-23 VITALS — WEIGHT: 212.97 LBS | HEIGHT: 69 IN

## 2023-01-23 LAB — GLUCOSE BLDC GLUCOMTR-MCNC: 140 MG/DL — HIGH (ref 70–99)

## 2023-01-23 PROCEDURE — 47382 PERCUT ABLATE LIVER RF: CPT

## 2023-01-23 PROCEDURE — 77013 CT GUIDE FOR TISSUE ABLATION: CPT | Mod: 26

## 2023-01-23 RX ORDER — CEFAZOLIN SODIUM 1 G
2000 VIAL (EA) INJECTION ONCE
Refills: 0 | Status: COMPLETED | OUTPATIENT
Start: 2023-01-23 | End: 2023-01-23

## 2023-01-23 RX ORDER — OXYCODONE AND ACETAMINOPHEN 5; 325 MG/1; MG/1
1 TABLET ORAL
Qty: 20 | Refills: 0
Start: 2023-01-23 | End: 2023-01-27

## 2023-01-23 NOTE — PRE-ANESTHESIA EVALUATION ADULT - NSANTHAPLANRD_GEN_ALL_CORE
Received call from patient requesting refill(s) of Norco and Robaxin      Last dispensed from pharmacy on   [place] 3/19/22  Robaxin 2/21/22     Patient's last office/virtual visit by prescribing provider on 2/17/22  Next office/virtual appointment scheduled for 4/6/22  3/21/22 Ed for facial swelling, allergic reaction      Last urine drug screen date 12/2021  Current opioid agreement on file (completed within the last year) Yes Date of opioid agreement: 12/2021     [facility]-pres[facility] to [facility]  Pending Prescriptions:                       Disp   Refills    HYDROcodone-acetaminophen (NORCO) 5-325 M*42 tab*0            Sig: Take 1 tablet by mouth 3 times daily as needed           for breakthrough pain or moderate to severe pain           May fill/start 4/2/22    methocarbamol (ROBAXIN) 500 MG tablet     30 tab*0            Sig: Take 1 tablet (500 mg) by mouth 4 times daily           Wean down on your frequency. - May fill/start           3/31/22        
general/monitored anesthesia care (MAC)

## 2023-01-23 NOTE — PROGRESS NOTE ADULT - SUBJECTIVE AND OBJECTIVE BOX
Interventional Radiology Outpatient Documentation    PREOPERATIVE DAY OF PROCEDURE EVALUATION:     I have personally seen and examined this patient. I agree with the history and physical which I have reviewed and noted any changes below:     Plan is for image-guided liver mass ablation with anesthesia.    Procedure/ risks/ benefits/ goals/ alternatives were explained. All questions answered. Informed content obtained from patient. Consent placed in chart.     POSTOPERATIVE PROCEDURAL EVALUATION:     Procedure: liver mass ablation    Pre-Op Diagnosis:  Cirrhosis/HCC    Post-Op Diagnosis: same    Attending: Go  Resident: None    Anesthesia (type):  [x ] General Anesthesia  [ ] Sedation  [ ] Spinal Anesthesia  [ x] Local/Regional    Contrast: 150 cc of IV contrast    Estimated Blood Loss: Minimal, < 5 cc    Condition:   [ ] Critical  [ ] Serious  [ ] Fair   [ x] Good    Findings/Follow up Plan of Care:  Successful microwave ablation of liver mass in segment 4.  No further enhancement seen on post procedure CT.      See full report in pacs    Complications: None    Disposition: Recovery

## 2023-02-10 ENCOUNTER — APPOINTMENT (OUTPATIENT)
Dept: INTERVENTIONAL RADIOLOGY/VASCULAR | Facility: CLINIC | Age: 58
End: 2023-02-10
Payer: MEDICAID

## 2023-02-10 VITALS
DIASTOLIC BLOOD PRESSURE: 71 MMHG | WEIGHT: 209 LBS | BODY MASS INDEX: 30.96 KG/M2 | HEART RATE: 85 BPM | TEMPERATURE: 98 F | HEIGHT: 69 IN | SYSTOLIC BLOOD PRESSURE: 106 MMHG

## 2023-02-10 PROCEDURE — 99214 OFFICE O/P EST MOD 30 MIN: CPT

## 2023-02-10 PROCEDURE — T1013: CPT

## 2023-02-10 NOTE — PHYSICAL EXAM
[Normal Rate and Effort] : normal respiratory rhythm and effort [Not Tender] : non-tender [Soft] : abdomen soft [Restricted in physically strenuous activity but ambulatory and able to carry out work of a light or sedentary nature] : Restricted in physically strenuous activity but ambulatory and able to carry out work of a light or sedentary nature, e.g., light house work, office work

## 2023-02-10 NOTE — REASON FOR VISIT
[Follow-Up: _____] : a [unfilled] follow-up visit [FreeTextEntry1] : s/p ablation of seg 4 mass  [Time Spent: ____ minutes] : Total time spent using  services: [unfilled] minutes. The patient's primary language is not English thus required  services. [Interpreters_FullName] : Yadira Gates [TWNoteComboBox1] : American Sign Language

## 2023-02-10 NOTE — ASSESSMENT
[FreeTextEntry1] : 57 year-old male s/p microwave ablation of seg 4 HCC.\par \par Plan:\par \par MRI in 3 months and clinic visit\par FU with Hepatology in 5/2023

## 2023-02-10 NOTE — HISTORY OF PRESENT ILLNESS
[FreeTextEntry1] : 57 year-old male with history of Hep C/cirrhosis, HCC treated with Y-90 in the past and now a new lesion measuring 1.8 cm in segment 4 (LR-4).  He is no longer following with the transplant team because it is too difficult for him to drive there and is not interested.  He is now 2 weeks s/p microwave ablation of the mass.  He is doing well without any pain.

## 2023-02-15 ENCOUNTER — OUTPATIENT (OUTPATIENT)
Dept: OUTPATIENT SERVICES | Facility: HOSPITAL | Age: 58
LOS: 1 days | End: 2023-02-15
Payer: MEDICAID

## 2023-02-15 ENCOUNTER — APPOINTMENT (OUTPATIENT)
Dept: INTERNAL MEDICINE | Facility: CLINIC | Age: 58
End: 2023-02-15

## 2023-02-15 ENCOUNTER — APPOINTMENT (OUTPATIENT)
Dept: INTERNAL MEDICINE | Facility: CLINIC | Age: 58
End: 2023-02-15
Payer: MEDICAID

## 2023-02-15 VITALS
BODY MASS INDEX: 31.1 KG/M2 | HEIGHT: 69 IN | SYSTOLIC BLOOD PRESSURE: 108 MMHG | OXYGEN SATURATION: 99 % | DIASTOLIC BLOOD PRESSURE: 73 MMHG | WEIGHT: 210 LBS | HEART RATE: 77 BPM | TEMPERATURE: 97.2 F

## 2023-02-15 DIAGNOSIS — E11.9 TYPE 2 DIABETES MELLITUS W/OUT COMPLICATIONS: ICD-10-CM

## 2023-02-15 DIAGNOSIS — E11.65 TYPE 2 DIABETES MELLITUS WITH HYPERGLYCEMIA: ICD-10-CM

## 2023-02-15 DIAGNOSIS — Z00.00 ENCOUNTER FOR GENERAL ADULT MEDICAL EXAMINATION WITHOUT ABNORMAL FINDINGS: ICD-10-CM

## 2023-02-15 DIAGNOSIS — I10 ESSENTIAL (PRIMARY) HYPERTENSION: ICD-10-CM

## 2023-02-15 DIAGNOSIS — E78.2 MIXED HYPERLIPIDEMIA: ICD-10-CM

## 2023-02-15 DIAGNOSIS — C22.0 LIVER CELL CARCINOMA: ICD-10-CM

## 2023-02-15 PROCEDURE — 99214 OFFICE O/P EST MOD 30 MIN: CPT

## 2023-02-15 PROCEDURE — 99214 OFFICE O/P EST MOD 30 MIN: CPT | Mod: GC

## 2023-02-15 RX ORDER — ATORVASTATIN CALCIUM 40 MG/1
40 TABLET, FILM COATED ORAL
Qty: 90 | Refills: 3 | Status: ACTIVE | COMMUNITY
Start: 2023-02-15 | End: 1900-01-01

## 2023-02-15 NOTE — ASSESSMENT
[FreeTextEntry1] : Real-time  services provided by [facility] Patient Experience  Ibeth Narla nena\par \par # Liver Cirrhosis\par # Hx alcohol abuse \par # HCC\par - follows c hepatology\par - alcohol cessation reinforced\par \par # Type II DM: uncontrolled\par - HbA1c: 8.9%, is on jardiance and ozempic, repeat labs today, increase ozempic to 1.5mg sq /weekly\par - has also been taking metformin uptitrated to 750mg and tolerating (reports prior intolerance from higher dosing)\par - Ophtho refused by patient \par - Podiatry follow up: Last seen 2022\par Low sugar, low carbohydrate diet \par Exercise Counseled \par Patient given opportunity to discuss frequency and target blood sugar levels\par Patient educated on symptoms of hypo/hyperglycemic events \par Counseled on: Yearly Ophthalmology and Podiatry Exam\par \par Obesity;\par Diet/Exercise Counseled\par Patient was counseled on changing their diet to low fat, low carbohydrates and low cholesterol.\par Patient was also counseled on increasing their activity to 45 minutes of exercise daily.\par bmi 31\par increase ozempic\par \par # HTN controlled\par HTN;\par DASH diet discussed and recommended\par Exercise and weight loss counseled \par Frequency and target at home BP readings discussed\par Decrease caffeine intake\par Treatment options and possible side effects discussed\par Patient counseled on symptoms of hypo/hypertension\par Counseled: Yearly Ophthalmology exams\par \par # Obesity\par Obesity;\par Diet/Exercise Counseled\par Patient was counseled on changing their diet to low fat, low carbohydrates and low cholesterol.\par Patient was also counseled on increasing their activity to 45 minutes of exercise daily.\par \par # Dyslipidemia: \par - T Total Chol: 217 HDL: 173 LDL: 123\par - ASCVD score: 21.52%\par - can use statin per hepatology however not currently on one \par - start atorvastatin 40mg QHS\par \par # HCM: \par - Repeat Labs \par - NLCST Low Dose CT Chest done x 2, WNL both times\par - Colonoscopy:  multiple polyps removed \par - EGD in 2021, no need for repeat per last hepatology\par - The patient has Cirrhosis, took his Pneumovax Aug 2020\par - RTC in 3 months. \par \par Patient also seen by resident Dr. Torres\par sign language interpretation services used at the bedside\par

## 2023-02-15 NOTE — HISTORY OF PRESENT ILLNESS
[FreeTextEntry1] : Follow Up for hcc, dm2, and htn. 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 Pt accompanied by   \par no active complaints. adherent w/ meds. Only drinking 1 glass of wine on occasion, vodka. denies withdrawal seizures. In terms of his smoking cut down from prior of 2 packs/day to smoking 1pack/week to quitting x 1 month ago but using e cigarettes now.

## 2023-02-15 NOTE — INTERPRETER SERVICES
[Other: ______] : provided by DONNA [Time Spent: ____ minutes] : Total time spent using  services: [unfilled] minutes. The patient's primary language is not English thus required  services. [Interpreters_FullName] : Ibeth [TWNoteComboBox1] : American Sign Language

## 2023-02-15 NOTE — PHYSICAL EXAM
[Normal] : normal rate, regular rhythm, normal S1 and S2 and no murmur heard [Soft] : abdomen soft [de-identified] : no jaundice [de-identified] : hyperpigmented shins [de-identified] : obese, no fluid shift, +spider angioma [de-identified] : spider angioma [de-identified] : no ulcers. b/l hallux no nails from prior

## 2023-02-17 DIAGNOSIS — C22.0 LIVER CELL CARCINOMA: ICD-10-CM

## 2023-02-17 DIAGNOSIS — K74.60 UNSPECIFIED CIRRHOSIS OF LIVER: ICD-10-CM

## 2023-02-17 DIAGNOSIS — E66.9 OBESITY, UNSPECIFIED: ICD-10-CM

## 2023-02-17 DIAGNOSIS — E78.2 MIXED HYPERLIPIDEMIA: ICD-10-CM

## 2023-02-17 DIAGNOSIS — Z00.00 ENCOUNTER FOR GENERAL ADULT MEDICAL EXAMINATION WITHOUT ABNORMAL FINDINGS: ICD-10-CM

## 2023-02-17 DIAGNOSIS — I10 ESSENTIAL (PRIMARY) HYPERTENSION: ICD-10-CM

## 2023-02-17 DIAGNOSIS — E11.65 TYPE 2 DIABETES MELLITUS WITH HYPERGLYCEMIA: ICD-10-CM

## 2023-02-17 DIAGNOSIS — E11.9 TYPE 2 DIABETES MELLITUS WITHOUT COMPLICATIONS: ICD-10-CM

## 2023-03-01 LAB
ALBUMIN SERPL ELPH-MCNC: 3.9 G/DL
ALP BLD-CCNC: 120 U/L
ALT SERPL-CCNC: 42 U/L
ANION GAP SERPL CALC-SCNC: 9 MMOL/L
AST SERPL-CCNC: 41 U/L
BASOPHILS # BLD AUTO: 0.03 K/UL
BASOPHILS NFR BLD AUTO: 0.6 %
BILIRUB SERPL-MCNC: 1.5 MG/DL
BUN SERPL-MCNC: 12 MG/DL
CALCIUM SERPL-MCNC: 9 MG/DL
CHLORIDE SERPL-SCNC: 103 MMOL/L
CHOLEST SERPL-MCNC: 176 MG/DL
CO2 SERPL-SCNC: 26 MMOL/L
CREAT SERPL-MCNC: 0.7 MG/DL
CREAT SPEC-SCNC: 57 MG/DL
CREAT/PROT UR: 0.2 RATIO
EGFR: 108 ML/MIN/1.73M2
EOSINOPHIL # BLD AUTO: 0 K/UL
EOSINOPHIL NFR BLD AUTO: 0 %
ESTIMATED AVERAGE GLUCOSE: 143 MG/DL
GLUCOSE SERPL-MCNC: 281 MG/DL
HBA1C MFR BLD HPLC: 6.6 %
HCT VFR BLD CALC: 42.7 %
HDLC SERPL-MCNC: 58 MG/DL
HGB BLD-MCNC: 15.1 G/DL
IMM GRANULOCYTES NFR BLD AUTO: 0.2 %
LDLC SERPL CALC-MCNC: 97 MG/DL
LYMPHOCYTES # BLD AUTO: 1.18 K/UL
LYMPHOCYTES NFR BLD AUTO: 24.8 %
MAN DIFF?: NORMAL
MCHC RBC-ENTMCNC: 34 PG
MCHC RBC-ENTMCNC: 35.4 G/DL
MCV RBC AUTO: 96.2 FL
MONOCYTES # BLD AUTO: 0.46 K/UL
MONOCYTES NFR BLD AUTO: 9.7 %
NEUTROPHILS # BLD AUTO: 3.07 K/UL
NEUTROPHILS NFR BLD AUTO: 64.7 %
NONHDLC SERPL-MCNC: 118 MG/DL
PLATELET # BLD AUTO: 79 K/UL
POTASSIUM SERPL-SCNC: 4.4 MMOL/L
PROT SERPL-MCNC: 6.7 G/DL
PROT UR-MCNC: 11 MG/DLG/24H
RBC # BLD: 4.44 M/UL
RBC # FLD: 13.2 %
SODIUM SERPL-SCNC: 138 MMOL/L
TRIGL SERPL-MCNC: 105 MG/DL
TSH SERPL-ACNC: 1.47 UIU/ML
WBC # FLD AUTO: 4.75 K/UL

## 2023-03-14 ENCOUNTER — APPOINTMENT (OUTPATIENT)
Dept: PODIATRY | Facility: CLINIC | Age: 58
End: 2023-03-14

## 2023-03-14 ENCOUNTER — OUTPATIENT (OUTPATIENT)
Dept: OUTPATIENT SERVICES | Facility: HOSPITAL | Age: 58
LOS: 1 days | End: 2023-03-14
Payer: MEDICAID

## 2023-03-14 ENCOUNTER — APPOINTMENT (OUTPATIENT)
Dept: PODIATRY | Facility: CLINIC | Age: 58
End: 2023-03-14
Payer: MEDICAID

## 2023-03-14 DIAGNOSIS — Z00.00 ENCOUNTER FOR GENERAL ADULT MEDICAL EXAMINATION WITHOUT ABNORMAL FINDINGS: ICD-10-CM

## 2023-03-14 DIAGNOSIS — E11.42 TYPE 2 DIABETES MELLITUS WITH DIABETIC POLYNEUROPATHY: ICD-10-CM

## 2023-03-14 DIAGNOSIS — B35.3 TINEA PEDIS: ICD-10-CM

## 2023-03-14 PROCEDURE — 99213 OFFICE O/P EST LOW 20 MIN: CPT

## 2023-03-14 RX ORDER — LIDOCAINE 5% 700 MG/1
5 PATCH TOPICAL
Qty: 30 | Refills: 5 | Status: ACTIVE | COMMUNITY
Start: 2022-09-20 | End: 1900-01-01

## 2023-03-14 NOTE — REASON FOR VISIT
[Follow-Up Visit] : a follow-up visit for [Foot Pain] : foot pain [Other: ______] : provided by DONNA [FreeTextEntry2] : B/L Foot Pain

## 2023-03-14 NOTE — HISTORY OF PRESENT ILLNESS
[Sneakers] : cari [FreeTextEntry1] : Diabetic Foot evaluation.\par Pain/Burning sensation to the bottom of his B/L feet, Ball of the feet, getting better  \par Got orthotics - he did not bring them with him. Forgot at home - help a little\par Last A1c over 8% - does not check his BG regularly \par Complaining of swelling a lot from his feet and malodor \par - Use of

## 2023-03-14 NOTE — PHYSICAL EXAM
[Ankle Swelling (On Exam)] : not present [Ankle Swelling Bilaterally] : bilaterally  [Varicose Veins Of Lower Extremities] : not present [Ankle Swelling On The Right] : mild [Delayed in the Right Toes] : capillary refills normal in right toes [Delayed in the Left Toes] : capillary refills normal in the left toes [2+] : left foot dorsalis pedis 2+ [Normal Foot/Ankle] : Both lower extremities were exposed and visualized. Standing exam demonstrates normal foot posture and alignment. Hindfoot exam shows no hindfoot valgus or varus [de-identified] : Rectus feet B/L. Mild Pain on palpation of the ball of his feet and dorsiflexion while WB B/L. Ankle Equinus B/L.  [] : no rash [Foot Ulcer] : no foot ulcer [Skin Induration] : no skin induration [FreeTextEntry1] : Scaly Skin B/L feet interdigital space  [Sensation] : the sensory exam was normal to light touch and pinprick [Vibration Dec.] : diminished vibratory sensation at the level of the toes

## 2023-03-14 NOTE — ASSESSMENT
[FreeTextEntry1] : Assessment:\par -Diabetes mellitus\par - B/L foot pain - likely metatarsalgia \par - Tinea pedis \par \par Plan:\par - Pt seen & evaluated\par - Educated on proper foot care and shoe wear\par - Educated to lower his BG and A1c \par - Cont OTC orthotics with met pad to alleviate Ball of the foot pain \par - Rx nystatin - interdigital space\par - Lysol shoes\par - Pt can purchase tennis shoes w/breathable fabric to help with sweating\par - RTC 6 months [Verbal] : verbal [Patient] : patient [Good - alert, interested, motivated] : Good - alert, interested, motivated [Demonstrates independently] : demonstrates independently [Foot Care] : foot care [Glycemic Control] : glycemic control

## 2023-03-15 DIAGNOSIS — E11.42 TYPE 2 DIABETES MELLITUS WITH DIABETIC POLYNEUROPATHY: ICD-10-CM

## 2023-03-15 DIAGNOSIS — B35.3 TINEA PEDIS: ICD-10-CM

## 2023-04-10 ENCOUNTER — RX RENEWAL (OUTPATIENT)
Age: 58
End: 2023-04-10

## 2023-04-10 RX ORDER — SEMAGLUTIDE 1.34 MG/ML
4 INJECTION, SOLUTION SUBCUTANEOUS WEEKLY
Qty: 3 | Refills: 1 | Status: ACTIVE | COMMUNITY
Start: 2021-08-19 | End: 1900-01-01

## 2023-04-29 ENCOUNTER — RX RENEWAL (OUTPATIENT)
Age: 58
End: 2023-04-29

## 2023-05-01 ENCOUNTER — RX RENEWAL (OUTPATIENT)
Age: 58
End: 2023-05-01

## 2023-05-01 RX ORDER — CLINDAMYCIN PHOSPHATE 1 G/10ML
1 GEL TOPICAL
Qty: 75 | Refills: 0 | Status: ACTIVE | COMMUNITY
Start: 2023-05-01 | End: 1900-01-01

## 2023-05-03 ENCOUNTER — RX RENEWAL (OUTPATIENT)
Age: 58
End: 2023-05-03

## 2023-05-08 ENCOUNTER — APPOINTMENT (OUTPATIENT)
Age: 58
End: 2023-05-08
Payer: MEDICAID

## 2023-05-08 ENCOUNTER — OUTPATIENT (OUTPATIENT)
Dept: OUTPATIENT SERVICES | Facility: HOSPITAL | Age: 58
LOS: 1 days | End: 2023-05-08
Payer: MEDICAID

## 2023-05-08 ENCOUNTER — APPOINTMENT (OUTPATIENT)
Dept: HEPATOLOGY | Facility: CLINIC | Age: 58
End: 2023-05-08

## 2023-05-08 VITALS
SYSTOLIC BLOOD PRESSURE: 113 MMHG | OXYGEN SATURATION: 96 % | HEIGHT: 69 IN | DIASTOLIC BLOOD PRESSURE: 77 MMHG | TEMPERATURE: 97.8 F

## 2023-05-08 DIAGNOSIS — Z00.00 ENCOUNTER FOR GENERAL ADULT MEDICAL EXAMINATION WITHOUT ABNORMAL FINDINGS: ICD-10-CM

## 2023-05-08 PROCEDURE — 99215 OFFICE O/P EST HI 40 MIN: CPT | Mod: GC

## 2023-05-08 PROCEDURE — 99215 OFFICE O/P EST HI 40 MIN: CPT

## 2023-05-08 NOTE — ASSESSMENT
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 No new lesions.\par Stable cirrhosis and splenomegaly/varices.\par - Other CLD w/u neg besides ESTELLE (1:640), Anti-Smooth muscle Ab (1:20)\par - no need for repeat EGD --> c/w propranolol 20 mg BID \par - Hep A vaccine s/p two doses \par - Hep A neg, Hep B Core IgG Pos w/ Neg Ab and Ag from 2016 indicating prior exposure\par - Hep A IgG ab reactive 9/27/22\par - HCV PCR: 277 on feb 2022--received ttt on may only \par - HCV PCR negative 9/27/22 and 12/2022 \par - AFP: 2/15/22: <1.8, 8/27/22: <1.8\par - INR 1.02 9/27/22\par - repeat MR discussed in tumor board: new 1.8 cm liver mass referred to IR, s/p ablation 1/23/23\par \par Rec:\par - stop  EtOH drinking\par - Monitor LFts/INR/CBC\par - c/w propranolol 20 mg BID \par - repeat Hep B DNA, check AFP\par - repeat MRI abdomen, check CT chest and NM bone scan for extra hepatic mets\par - follow up in three months \par \par # h/o colon polyps s/p surveillance colonoscopy \par - f/u with general GI

## 2023-05-08 NOTE — REASON FOR VISIT
[Follow-Up: _____] : a [unfilled] follow-up visit [Interpreters_FullName] : Shanice Gates [TWNoteComboBox1] : American Sign Language

## 2023-05-08 NOTE — END OF VISIT
[] : Resident [FreeTextEntry3] : 57 year old male of [place] origin with speech impairment HCV SVR 12 after 4 weeks of Epclusa, compensated cirrhosis EV HCC with TARE complicated by celiac artery dissection in 2016 segment 4  HCC post MWA Feb 2023 seen in follow up. \par He has no symptoms of decompensation. Still drinking alcohol but trying to stop \par Took Epclusa in May 2022 for a month but did not follow up after that. \par Abdomen soft non tender \par EGD 2021 showed small varices and started on NSB. On propranolol to 20 mg BID. No need for repeat EGD.\par No ascites on exam and last imaging.\par Patient only took 4 weeks of Epclusa and although did not complete hepatitis C treatment but achieved SVR 12. HCV RNA undetectable in in December. Prior Hep B exposure. Will check HBV DNA \par Hepatitis A - has immunity\par Needs MRI abdomen CT chest and bone scan AFP and MELD labs. \par Colonoscopy - multiple polyps Sep 2021 and repeat in 3 - 5 years\par F/u in 3 months\par \par  [Time Spent: ___ minutes] : I have spent [unfilled] minutes of time on the encounter.

## 2023-05-08 NOTE — PHYSICAL EXAM
[General Appearance - Alert] : alert [General Appearance - In No Acute Distress] : in no acute distress [General Appearance - Well Nourished] : well nourished [General Appearance - Well Developed] : well developed [General Appearance - Well-Appearing] : healthy appearing [Sclera] : the sclera and conjunctiva were normal [Neck Appearance] : the appearance of the neck was normal [Respiration, Rhythm And Depth] : normal respiratory rhythm and effort [Exaggerated Use Of Accessory Muscles For Inspiration] : no accessory muscle use [Chest Palpation] : palpation of the chest revealed no abnormalities [Heart Rate And Rhythm] : heart rate was normal and rhythm regular [Heart Sounds] : normal S1 and S2 [Heart Sounds Gallop] : no gallops [Bowel Sounds] : normal bowel sounds [Abdomen Soft] : soft [Abdomen Tenderness] : non-tender [] : no hepato-splenomegaly [Abnormal Walk] : normal gait [Musculoskeletal - Swelling] : no joint swelling seen [Skin Color & Pigmentation] : normal skin color and pigmentation [Oriented To Time, Place, And Person] : oriented to person, place, and time [Scleral Icterus] : No Scleral Icterus [Spider Angioma] : No spider angioma(s) were observed [Abdominal  Ascites] : no ascites [Asterixis] : no asterixis observed [Jaundice] : No jaundice [Palmar Erythema] : no Palmar Erythema

## 2023-05-08 NOTE — HISTORY OF PRESENT ILLNESS
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 Patient noted to have a new 1.8cm lesion in the liver on MRI, sent to IR s/p ablation 1/23/23. Patient doing well after the procedure, reports he cut down on his drinking from 3-4 drinks every other day to now 2-3 drinks on the weekends and sometimes during the week. Pt previously smoked cigarettes every 30 minutes but now has cut back and replaced with vaping since March 2023.\par \par The pt denies any nausea, vomiting, abdominal pain, abdominal distension, diarrhea, constipation, change in the color of stool or urine, recent change in appetite or weight.\par \par Past EGD: 2021 w Dr Velez reveled small EV and patient has been on Propranolol 20 mg BID. \par \par last colonoscopy in 2021: one small TA. \par

## 2023-05-17 DIAGNOSIS — Z72.89 OTHER PROBLEMS RELATED TO LIFESTYLE: ICD-10-CM

## 2023-05-17 DIAGNOSIS — C22.0 LIVER CELL CARCINOMA: ICD-10-CM

## 2023-05-17 DIAGNOSIS — K74.60 UNSPECIFIED CIRRHOSIS OF LIVER: ICD-10-CM

## 2023-05-31 ENCOUNTER — APPOINTMENT (OUTPATIENT)
Dept: INTERNAL MEDICINE | Facility: CLINIC | Age: 58
End: 2023-05-31

## 2023-06-14 ENCOUNTER — RX RENEWAL (OUTPATIENT)
Age: 58
End: 2023-06-14

## 2023-06-14 ENCOUNTER — EMERGENCY (EMERGENCY)
Facility: HOSPITAL | Age: 58
LOS: 0 days | Discharge: HOME | End: 2023-06-14
Attending: STUDENT IN AN ORGANIZED HEALTH CARE EDUCATION/TRAINING PROGRAM
Payer: MEDICAID

## 2023-06-14 VITALS
HEART RATE: 62 BPM | DIASTOLIC BLOOD PRESSURE: 66 MMHG | SYSTOLIC BLOOD PRESSURE: 126 MMHG | RESPIRATION RATE: 20 BRPM | OXYGEN SATURATION: 99 % | TEMPERATURE: 98 F

## 2023-06-14 DIAGNOSIS — H92.02 OTALGIA, LEFT EAR: ICD-10-CM

## 2023-06-14 DIAGNOSIS — Z86.19 PERSONAL HISTORY OF OTHER INFECTIOUS AND PARASITIC DISEASES: ICD-10-CM
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
## 2023-06-14 DIAGNOSIS — I10 ESSENTIAL (PRIMARY) HYPERTENSION: ICD-10-CM

## 2023-06-14 DIAGNOSIS — R07.81 PLEURODYNIA: ICD-10-CM
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
## 2023-06-14 DIAGNOSIS — E11.9 TYPE 2 DIABETES MELLITUS WITHOUT COMPLICATIONS: ICD-10-CM

## 2023-06-14 DIAGNOSIS — Z85.05 PERSONAL HISTORY OF MALIGNANT NEOPLASM OF LIVER: ICD-10-CM

## 2023-06-14 DIAGNOSIS — Z79.84 LONG TERM (CURRENT) USE OF ORAL HYPOGLYCEMIC DRUGS: ICD-10-CM

## 2023-06-14 DIAGNOSIS — H91.90 UNSPECIFIED HEARING LOSS, UNSPECIFIED EAR: ICD-10-CM

## 2023-06-14 DIAGNOSIS — H66.92 OTITIS MEDIA, UNSPECIFIED, LEFT EAR: ICD-10-CM
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
## 2023-06-14 PROCEDURE — 99283 EMERGENCY DEPT VISIT LOW MDM: CPT | Mod: 25

## 2023-06-14 PROCEDURE — 71046 X-RAY EXAM CHEST 2 VIEWS: CPT | Mod: 26

## 2023-06-14 PROCEDURE — 71046 X-RAY EXAM CHEST 2 VIEWS: CPT

## 2023-06-14 PROCEDURE — 99284 EMERGENCY DEPT VISIT MOD MDM: CPT

## 2023-06-14 RX ORDER — BLOOD SUGAR DIAGNOSTIC
STRIP MISCELLANEOUS
Qty: 90 | Refills: 3 | Status: ACTIVE | COMMUNITY
Start: 2023-06-14 | End: 1900-01-01

## 2023-06-14 RX ORDER — POLYMYXIN B SULF/TRIMETHOPRIM 10000-1/ML
1 DROPS OPHTHALMIC (EYE) ONCE
Refills: 0 | Status: DISCONTINUED | OUTPATIENT
Start: 2023-06-14 | End: 2023-06-14

## 2023-06-14 RX ORDER — METHOCARBAMOL 500 MG/1
2 TABLET, FILM COATED ORAL
Qty: 16 | Refills: 0
Start: 2023-06-14 | End: 2023-06-17

## 2023-06-14 NOTE — ED PROVIDER NOTE - ATTENDING CONTRIBUTION TO CARE
57y M pmh Deaf, HCV, HCC, DM, HTN presents for eval of L ear pain  pt states pain to L ear for several days. pain worse when he placed a qtip in ear. no alleviating factors. pt also states he has R rib pain after wife elbowed him accidentally. pain worse w/ certain movements.  no f/c/dental pain, hearing change, vertigo.     vss  gen- NAD, aaox3  Ears-   L ear- external exam normal- no erythema, ecchymosis, fluctuance, rash, vesicles, or laceration to external ear; ear canal w/ white discharge/debris; TM w/o erythema, bulging or vesicles, cone of light normal; no tenderness or erythema to mastoid  R ear- external exam normal- no erythema, ecchymosis, fluctuance, rash, vesicles, or laceration to external ear; ear canal w/o erythema, lesions or debris; TM w/o erythema, bulging or vesicles, cone of light normal; no tenderness or erythema to mastoid    card-rrr  lungs-ctab, no wheezing or rhonchi  abd-sntnd, no guarding or rebound  neuro- full str/sensation, cn ii-xii grossly intact, normal coordination and gait

## 2023-06-14 NOTE — ED PROVIDER NOTE - NS ED ATTENDING STATEMENT MOD
I have seen and examined this patient and fully participated in the care of this patient as the teaching attending.  The service was shared with the MALI.  I reviewed and verified the documentation and independently performed the documented:

## 2023-06-14 NOTE — ED ADULT NURSE NOTE - CHIEF COMPLAINT QUOTE
Pt c/o L ear pain and R rib pain, pt states his wife accidentally hit his R side while sleeping, denies sob, pt is deaf,  Ibeth Noble present in triage

## 2023-06-14 NOTE — ED PROVIDER NOTE - PHYSICAL EXAMINATION
CONST: Well appearing in NAD  EYES: Sclera and conjunctiva clear.   ENT: L ear otitis externa. R tm clear. No nasal discharge. Oropharynx normal appearing, no erythema or exudates. No abscess or swelling. Uvula midline.   NECK: Non-tender, no meningeal signs. normal ROM. supple   CARD: S1 S2; No jvd  RESP: Equal BS B/L, No wheezes, rhonchi or rales. No distress  GI: Soft, non-tender, non-distended. no cva tenderness. normal BS  MS: Normal ROM in all extremities. pulses 2 +. no calf tenderness or swelling  SKIN: Warm, dry, no acute rashes. Good turgor  NEURO: A&Ox3, No focal deficits. Strength 5/5 with no sensory deficits. Steady gait.

## 2023-06-14 NOTE — ED PROVIDER NOTE - OBJECTIVE STATEMENT
57y M pmh Deaf, HCV, HCC, DM, HTN presents for eval of L ear pain. Pt has mild pressure like pain localized to L ear for the past couple days, aggravated when he stuck a q-tip in it, no relieving factors. Pt also states his wife elbowed him in her R rips while sleeping a wk ago and since has mild aching R rib pain, aggravated with flexion, relieved at rest. Denies fever, ha, change in vision, cp, sob, weakness, numbness, dysuria, hematuria, n/v/d/c

## 2023-06-14 NOTE — ED PROVIDER NOTE - CLINICAL SUMMARY MEDICAL DECISION MAKING FREE TEXT BOX
xr negative for fx  ear exam c/w otitis externa  will dc on otic abs, return precautions   rec pcp and ent f/u

## 2023-06-14 NOTE — ED PROVIDER NOTE - CARE PROVIDER_API CALL
Brian Berrios  Otolaryngology  [address] [place] [id number]  Phone: [phone]  Fax: [phone]  Follow Up Time:

## 2023-06-14 NOTE — ED ADULT TRIAGE NOTE - CHIEF COMPLAINT QUOTE
Pt c/o L ear pain Pt c/o L ear pain and R rib pain, pt states his wife accidentally hit his R side while sleeping, denies sob, pt is deaf,  Ibeth Noble present in triage

## 2023-06-14 NOTE — ED ADULT NURSE REASSESSMENT NOTE - NS ED NURSE REASSESS COMMENT FT1
Pt given discharge instructions with medication instructions and referral MD appointment with Ibeth PEDRAZA at bedside.

## 2023-06-14 NOTE — ED PROVIDER NOTE - NSFOLLOWUPINSTRUCTIONS_ED_ALL_ED_FT
Follow up with PMD and ENT in 1-2 days.    Otitis Externa    Otitis externa is a bacterial or fungal infection of the outer ear canal. This is the area from the eardrum to the outside of the ear. Otitis externa is sometimes called "swimmer's ear." Causes include swimming in dirty water, moisture remaining in ear after swimming or bathing, trauma to the ear, objects stuck in the ear, or cuts or scrapes in the outside of the ear. Symptoms include itching, pain, swelling, redness in the ear canal, and fluid coming from the ear. To prevent recurrence of otitis externa, keep your ear dry, avoid scratching or putting objects inside your ear including cotton swabs, and avoid swimming in polluted water or poorly chlorinated pools.    SEEK IMMEDIATE MEDICAL CARE IF YOU HAVE ANY OF THE FOLLOWING SYMPTOMS: fever, worsening symptoms, headache, redness/swelling/pain over the bone behind your ear.

## 2023-06-14 NOTE — ED PROVIDER NOTE - PATIENT PORTAL LINK FT
You can access the FollowMyHealth Patient Portal offered by [facility] by registering at the following website: [email or web address]. By joining [facility]’s FollowMyHealth portal, you will also be able to view your health information using other applications (apps) compatible with our system.

## 2023-06-15 ENCOUNTER — RX RENEWAL (OUTPATIENT)
Age: 58
End: 2023-06-15

## 2023-06-30 NOTE — ASU PATIENT PROFILE, ADULT - TEACHING/LEARNING DEVELOPMENTAL CONSIDERATIONS
26 yo female, no stated PMH, presented to the ED c/o lower back pain, numbness to bilateral lower extremities and intermittent numbness to bilateral hands.  No hx/o injury or trauma reported.  In the ED, VSS, pt afebrile.  Neurology was consulted; LP was attempted but was unsuccessful in the ED; Neurology advised MRI C/T/L spine. Pt was sent to CDU for MRI imaging and continued care; Neurology is following pt. MRI showing ____, pt reassessed by neuro recommending ____ 28 yo female, no stated PMH, presented to the ED c/o lower back pain, numbness to bilateral lower extremities and intermittent numbness to bilateral hands.  No hx/o injury or trauma reported.  In the ED, VSS, pt afebrile.  Neurology was consulted; LP was attempted but was unsuccessful in the ED; Neurology advised MRI C/T/L spine. Pt was sent to CDU for MRI imaging and continued care; Neurology is following pt. MRI resulted showing disc bulges from C3-C6, T5-6 there is a central to right parasagittal disc protrusion which causes effacement of ventral thecal sac and minimal effacement of the ventral spinal cord just to the right of midline with no significant compromise of the spinal canal or either neural, at T6-7 there is a small right-sided disc protrusion which causes minimal effacement of the ventral thecal sac, at L5-S1 disc bulges seen with associated T2 probable prolongation within this disc bulge likely compatible with an annular fissure with minimal effacement of the central thecal sac, no significant compromise of the spinal canal or either neural foramen.  Overall impression degenerative changes and disc protrusions.  Patient reassessed by neurology, she no longer is having numbness or tingling in the hands or feet does report continued mild low back pain.  Neurology recommending outpatient follow-up for EMG.  At time of discharge patient is well-appearing, vitals stable, non focal neuro exam, pt is ambulatory in CDU without difficulty. Pt to follow up with her PMD and neurology, will return to the ER with any worsening or concerning symptoms. none

## 2023-07-18 ENCOUNTER — RX RENEWAL (OUTPATIENT)
Age: 58
End: 2023-07-18

## 2023-08-29 ENCOUNTER — OUTPATIENT (OUTPATIENT)
Dept: OUTPATIENT SERVICES | Facility: HOSPITAL | Age: 58
LOS: 1 days | End: 2023-08-29
Payer: MEDICAID

## 2023-08-29 DIAGNOSIS — K74.60 UNSPECIFIED CIRRHOSIS OF LIVER: ICD-10-CM

## 2023-08-29 PROCEDURE — 85027 COMPLETE CBC AUTOMATED: CPT

## 2023-08-29 PROCEDURE — 82105 ALPHA-FETOPROTEIN SERUM: CPT

## 2023-08-29 PROCEDURE — 36415 COLL VENOUS BLD VENIPUNCTURE: CPT

## 2023-08-29 PROCEDURE — 80053 COMPREHEN METABOLIC PANEL: CPT

## 2023-08-29 PROCEDURE — 87517 HEPATITIS B DNA QUANT: CPT

## 2023-08-29 PROCEDURE — 80061 LIPID PANEL: CPT

## 2023-08-30 DIAGNOSIS — K74.60 UNSPECIFIED CIRRHOSIS OF LIVER: ICD-10-CM

## 2023-09-01 LAB
AFP-TM SERPL-MCNC: <1.8 NG/ML
ALBUMIN SERPL ELPH-MCNC: 4.2 G/DL
ALP BLD-CCNC: 181 U/L
ALT SERPL-CCNC: 55 U/L
ANION GAP SERPL CALC-SCNC: 15 MMOL/L
AST SERPL-CCNC: 54 U/L
BILIRUB SERPL-MCNC: 1.6 MG/DL
BUN SERPL-MCNC: 11 MG/DL
CALCIUM SERPL-MCNC: 9.7 MG/DL
CHLORIDE SERPL-SCNC: 103 MMOL/L
CHOLEST SERPL-MCNC: 154 MG/DL
CO2 SERPL-SCNC: 23 MMOL/L
CREAT SERPL-MCNC: 0.7 MG/DL
EGFR: 107 ML/MIN/1.73M2
GLUCOSE SERPL-MCNC: 224 MG/DL
HBV DNA # SERPL NAA+PROBE: <10 IU/ML
HDLC SERPL-MCNC: 62 MG/DL
HEPB DNA PCR INT: DETECTED IU/ML
HEPB DNA PCR LOG: <1 LOGIU/ML
LDLC SERPL CALC-MCNC: 71 MG/DL
NONHDLC SERPL-MCNC: 92 MG/DL
POTASSIUM SERPL-SCNC: 4.4 MMOL/L
PROT SERPL-MCNC: 7.2 G/DL
SODIUM SERPL-SCNC: 141 MMOL/L
TRIGL SERPL-MCNC: 103 MG/DL

## 2023-09-02 ENCOUNTER — OUTPATIENT (OUTPATIENT)
Dept: OUTPATIENT SERVICES | Facility: HOSPITAL | Age: 58
LOS: 1 days | End: 2023-09-02
Payer: MEDICAID

## 2023-09-02 ENCOUNTER — RESULT REVIEW (OUTPATIENT)
Age: 58
End: 2023-09-02

## 2023-09-02 DIAGNOSIS — K74.60 UNSPECIFIED CIRRHOSIS OF LIVER: ICD-10-CM

## 2023-09-02 PROCEDURE — A9579: CPT

## 2023-09-02 PROCEDURE — 74183 MRI ABD W/O CNTR FLWD CNTR: CPT

## 2023-09-02 PROCEDURE — 74183 MRI ABD W/O CNTR FLWD CNTR: CPT | Mod: 26

## 2023-09-03 DIAGNOSIS — K74.60 UNSPECIFIED CIRRHOSIS OF LIVER: ICD-10-CM

## 2023-09-08 ENCOUNTER — APPOINTMENT (OUTPATIENT)
Dept: INTERVENTIONAL RADIOLOGY/VASCULAR | Facility: CLINIC | Age: 58
End: 2023-09-08
Payer: MEDICAID

## 2023-09-08 VITALS
SYSTOLIC BLOOD PRESSURE: 106 MMHG | HEART RATE: 88 BPM | OXYGEN SATURATION: 98 % | TEMPERATURE: 97.8 F | DIASTOLIC BLOOD PRESSURE: 70 MMHG

## 2023-09-08 PROCEDURE — 99214 OFFICE O/P EST MOD 30 MIN: CPT | Mod: 25

## 2023-09-08 PROCEDURE — T1013: CPT

## 2023-09-08 NOTE — ASSESSMENT
[FreeTextEntry1] : 58 male with history of HCC, with post treatment cavities without any viable tumor but new LR-3/4 lesions.  Plan:  Hepatology follow up on 9/11 Advised him to reconsider transplant evaluation since he will likely begin forming multiple tumors Will need MRI in 3 months-these new lesions will likely need treatment in the future.

## 2023-09-08 NOTE — PHYSICAL EXAM
[No Respiratory Distress] : no respiratory distress [Not Tender] : non-tender [Soft] : abdomen soft No

## 2023-09-08 NOTE — HISTORY OF PRESENT ILLNESS
[FreeTextEntry1] : 58 year-old male with history of Hep C/cirrhosis, HCC treated with Y-90 in the past and now a new lesion measuring 1.8 cm in segment 4 (LR-4).  He is no longer following with the transplant team because it is too difficult for him to drive there and is not interested.  He is now over 3 months s/p microwave ablation of the mass.  He is doing well without any pain.  Imaging shows nonviable treatment cavities but he now has new small Lirads-3 and Lirads-4 lesions.

## 2023-09-08 NOTE — REASON FOR VISIT
[Time Spent: ____ minutes] : Total time spent using  services: [unfilled] minutes. The patient's primary language is not English thus required  services. [Follow-Up: _____] : a [unfilled] follow-up visit [FreeTextEntry1] : s/p microwave ablation of liver lesion [Interpreters_FullName] : Yadira Gates [TWNoteComboBox1] : American Sign Language

## 2023-09-08 NOTE — PATIENT PROFILE ADULT - IS THERE A SUSPICION OF ABUSE/NEGLIGENCE?
Daily Note     Today's date: 2023  Patient name: Ann Marie Chavez  : 1952  MRN: [id number]  Referring provider: Luisa Escalante DO  Dx:   Encounter Diagnosis     ICD-10-CM    1. Dizziness  R42           Subjective: continued sxs improvement since last visit       Objective: See treatment diary below      Assessment: Tolerated treatment well with progression of treatment program as noted below requiring verbal and tactile cues from PT for safe execution of therapeutic exercise. Patient with + response to conservative treatment for cervicogenic dizziness and  demonstrated fatigue post treatment, exhibited good technique with therapeutic exercises and would benefit from continued PT      Plan: Progress treatment as tolerated. Continue with cervicothoracic mobility and stability therex for decreased dizziness.      Precautions: Vertigo      Manuals             Canalith repositioning maneuvers             SOR NS            STM cervical paraspinal mm NS            Cervical PROM NS            Neuro Re-Ed             Chin tuck iso 20x5" ea supine            Mid row iso x20 OTB            Shld ext iso x20 YTB            VOR x1             VOR x2             Tandem stance             SLS             Ther Ex             Cervical SB stretch 10x10" ea            Cervical Rot stretch 10x10" ea                                                                                          Ther Activity             Pt ed: HEP NS 2'                         Gait Training                                       Modalities no

## 2023-09-11 ENCOUNTER — OUTPATIENT (OUTPATIENT)
Dept: OUTPATIENT SERVICES | Facility: HOSPITAL | Age: 58
LOS: 1 days | End: 2023-09-11
Payer: MEDICAID

## 2023-09-11 ENCOUNTER — APPOINTMENT (OUTPATIENT)
Age: 58
End: 2023-09-11
Payer: MEDICAID

## 2023-09-11 VITALS
HEART RATE: 84 BPM | SYSTOLIC BLOOD PRESSURE: 119 MMHG | DIASTOLIC BLOOD PRESSURE: 77 MMHG | TEMPERATURE: 97.7 F | OXYGEN SATURATION: 97 % | HEIGHT: 69 IN

## 2023-09-11 DIAGNOSIS — D49.0 NEOPLASM OF UNSPECIFIED BEHAVIOR OF DIGESTIVE SYSTEM: ICD-10-CM

## 2023-09-11 DIAGNOSIS — Z78.9 OTHER SPECIFIED HEALTH STATUS: ICD-10-CM

## 2023-09-11 DIAGNOSIS — Z00.00 ENCOUNTER FOR GENERAL ADULT MEDICAL EXAMINATION W/OUT ABNORMAL FINDINGS: ICD-10-CM

## 2023-09-11 DIAGNOSIS — R76.8 OTHER SPECIFIED ABNORMAL IMMUNOLOGICAL FINDINGS IN SERUM: ICD-10-CM

## 2023-09-11 DIAGNOSIS — Z00.00 ENCOUNTER FOR GENERAL ADULT MEDICAL EXAMINATION WITHOUT ABNORMAL FINDINGS: ICD-10-CM

## 2023-09-11 DIAGNOSIS — Z85.9 PERSONAL HISTORY OF MALIGNANT NEOPLASM, UNSPECIFIED: ICD-10-CM

## 2023-09-11 PROCEDURE — 99215 OFFICE O/P EST HI 40 MIN: CPT | Mod: GC

## 2023-09-11 PROCEDURE — 99205 OFFICE O/P NEW HI 60 MIN: CPT

## 2023-09-14 ENCOUNTER — RX RENEWAL (OUTPATIENT)
Age: 58
End: 2023-09-14

## 2023-09-14 ENCOUNTER — NON-APPOINTMENT (OUTPATIENT)
Age: 58
End: 2023-09-14

## 2023-09-14 DIAGNOSIS — R76.8 OTHER SPECIFIED ABNORMAL IMMUNOLOGICAL FINDINGS IN SERUM: ICD-10-CM

## 2023-09-14 DIAGNOSIS — Z72.89 OTHER PROBLEMS RELATED TO LIFESTYLE: ICD-10-CM

## 2023-09-14 DIAGNOSIS — Z85.9 PERSONAL HISTORY OF MALIGNANT NEOPLASM, UNSPECIFIED: ICD-10-CM

## 2023-09-14 DIAGNOSIS — K74.60 UNSPECIFIED CIRRHOSIS OF LIVER: ICD-10-CM

## 2023-09-14 DIAGNOSIS — Z78.9 OTHER SPECIFIED HEALTH STATUS: ICD-10-CM

## 2023-09-14 DIAGNOSIS — Z00.00 ENCOUNTER FOR GENERAL ADULT MEDICAL EXAMINATION WITHOUT ABNORMAL FINDINGS: ICD-10-CM

## 2023-09-14 DIAGNOSIS — Z86.19 PERSONAL HISTORY OF OTHER INFECTIOUS AND PARASITIC DISEASES: ICD-10-CM

## 2023-09-14 DIAGNOSIS — C22.0 LIVER CELL CARCINOMA: ICD-10-CM

## 2023-09-14 DIAGNOSIS — D49.0 NEOPLASM OF UNSPECIFIED BEHAVIOR OF DIGESTIVE SYSTEM: ICD-10-CM

## 2023-09-14 RX ORDER — KETOCONAZOLE FOAM 20 MG/G
2 AEROSOL, FOAM TOPICAL
Qty: 1 | Refills: 2 | Status: ACTIVE | COMMUNITY
Start: 2023-05-03 | End: 1900-01-01

## 2023-09-19 ENCOUNTER — APPOINTMENT (OUTPATIENT)
Dept: PODIATRY | Facility: CLINIC | Age: 58
End: 2023-09-19

## 2023-10-10 ENCOUNTER — RESULT REVIEW (OUTPATIENT)
Age: 58
End: 2023-10-10

## 2023-10-10 ENCOUNTER — OUTPATIENT (OUTPATIENT)
Dept: OUTPATIENT SERVICES | Facility: HOSPITAL | Age: 58
LOS: 1 days | End: 2023-10-10
Payer: MEDICAID

## 2023-10-10 DIAGNOSIS — K74.60 UNSPECIFIED CIRRHOSIS OF LIVER: ICD-10-CM

## 2023-10-10 PROCEDURE — A9503: CPT

## 2023-10-10 PROCEDURE — 78803 RP LOCLZJ TUM SPECT 1 AREA: CPT | Mod: 26

## 2023-10-10 PROCEDURE — 78803 RP LOCLZJ TUM SPECT 1 AREA: CPT

## 2023-10-10 PROCEDURE — 78306 BONE IMAGING WHOLE BODY: CPT | Mod: 26

## 2023-10-10 PROCEDURE — 78306 BONE IMAGING WHOLE BODY: CPT

## 2023-10-11 DIAGNOSIS — K74.60 UNSPECIFIED CIRRHOSIS OF LIVER: ICD-10-CM

## 2023-10-12 ENCOUNTER — RX RENEWAL (OUTPATIENT)
Age: 58
End: 2023-10-12

## 2023-11-06 ENCOUNTER — RX RENEWAL (OUTPATIENT)
Age: 58
End: 2023-11-06

## 2023-12-02 ENCOUNTER — RESULT REVIEW (OUTPATIENT)
Age: 58
End: 2023-12-02

## 2023-12-02 ENCOUNTER — OUTPATIENT (OUTPATIENT)
Dept: OUTPATIENT SERVICES | Facility: HOSPITAL | Age: 58
LOS: 1 days | End: 2023-12-02
Payer: MEDICAID

## 2023-12-02 DIAGNOSIS — K74.60 UNSPECIFIED CIRRHOSIS OF LIVER: ICD-10-CM

## 2023-12-02 PROCEDURE — 74183 MRI ABD W/O CNTR FLWD CNTR: CPT | Mod: 26

## 2023-12-02 PROCEDURE — 74183 MRI ABD W/O CNTR FLWD CNTR: CPT

## 2023-12-02 PROCEDURE — A9579: CPT

## 2023-12-03 DIAGNOSIS — K74.60 UNSPECIFIED CIRRHOSIS OF LIVER: ICD-10-CM

## 2023-12-08 ENCOUNTER — APPOINTMENT (OUTPATIENT)
Dept: INTERVENTIONAL RADIOLOGY/VASCULAR | Facility: CLINIC | Age: 58
End: 2023-12-08

## 2023-12-14 ENCOUNTER — RX RENEWAL (OUTPATIENT)
Age: 58
End: 2023-12-14

## 2023-12-15 ENCOUNTER — APPOINTMENT (OUTPATIENT)
Dept: INTERVENTIONAL RADIOLOGY/VASCULAR | Facility: CLINIC | Age: 58
End: 2023-12-15
Payer: MEDICAID

## 2023-12-15 VITALS
SYSTOLIC BLOOD PRESSURE: 113 MMHG | HEART RATE: 84 BPM | DIASTOLIC BLOOD PRESSURE: 73 MMHG | OXYGEN SATURATION: 99 % | TEMPERATURE: 97.6 F

## 2023-12-15 PROCEDURE — T1013A: CUSTOM

## 2023-12-15 PROCEDURE — 99214 OFFICE O/P EST MOD 30 MIN: CPT

## 2023-12-15 NOTE — REASON FOR VISIT
[Time Spent: ____ minutes] : Total time spent using  services: [unfilled] minutes. The patient's primary language is not English thus required  services. [Follow-Up: _____] : a [unfilled] follow-up visit [FreeTextEntry1] : Liver lesions [Interpreters_FullName] : Yadira Gates [TWNoteComboBox1] : American Sign Language

## 2023-12-15 NOTE — ASSESSMENT
[FreeTextEntry1] : 58 year-old male with HCC.  Plan:  Ablation for LIRADS 5 seg II/Philipp; if ablation not successful Y-90 would also be an option. Other area (LIRADS 4) is small and will watch.  Will be difficult to locate.  Risk benefits, alternatives discussed at length with the patient and he would like to proceed.

## 2023-12-15 NOTE — HISTORY OF PRESENT ILLNESS
[FreeTextEntry1] : 58 year-old male with history of Hep C/cirrhosis, HCC treated with Y-90 in the past and and microwave ablation.  He is no longer following with the transplant team because it is too difficult for him to drive there and is not interested.  He is doing well without any pain.  Imaging shows nonviable treatment cavities but he now has new1.9 cm Lirads 5 lesion in segment II/Philipp and 0.7 cm Lirads 4 lesion in 6/7.  He has been noncompliant with his drinking.

## 2024-01-03 ENCOUNTER — RX RENEWAL (OUTPATIENT)
Age: 59
End: 2024-01-03

## 2024-01-08 ENCOUNTER — OUTPATIENT (OUTPATIENT)
Dept: OUTPATIENT SERVICES | Facility: HOSPITAL | Age: 59
LOS: 1 days | End: 2024-01-08
Payer: MEDICAID

## 2024-01-08 ENCOUNTER — APPOINTMENT (OUTPATIENT)
Age: 59
End: 2024-01-08
Payer: MEDICAID

## 2024-01-08 VITALS
HEIGHT: 69 IN | OXYGEN SATURATION: 98 % | SYSTOLIC BLOOD PRESSURE: 134 MMHG | TEMPERATURE: 97.9 F | HEART RATE: 97 BPM | DIASTOLIC BLOOD PRESSURE: 82 MMHG

## 2024-01-08 DIAGNOSIS — Z00.00 ENCOUNTER FOR GENERAL ADULT MEDICAL EXAMINATION WITHOUT ABNORMAL FINDINGS: ICD-10-CM

## 2024-01-08 DIAGNOSIS — E66.9 OBESITY, UNSPECIFIED: ICD-10-CM

## 2024-01-08 DIAGNOSIS — F10.90 ALCOHOL USE, UNSPECIFIED, UNCOMPLICATED: ICD-10-CM

## 2024-01-08 DIAGNOSIS — I85.10 UNSPECIFIED CIRRHOSIS OF LIVER: ICD-10-CM

## 2024-01-08 DIAGNOSIS — K74.60 UNSPECIFIED CIRRHOSIS OF LIVER: ICD-10-CM

## 2024-01-08 DIAGNOSIS — Z86.19 PERSONAL HISTORY OF OTHER INFECTIOUS AND PARASITIC DISEASES: ICD-10-CM

## 2024-01-08 DIAGNOSIS — C22.0 LIVER CELL CARCINOMA: ICD-10-CM

## 2024-01-08 PROCEDURE — 99214 OFFICE O/P EST MOD 30 MIN: CPT

## 2024-01-08 NOTE — HISTORY OF PRESENT ILLNESS
[Fatigue] : denies fatigue [Malaise] : denies malaise [Fever] : denies fever [Diffuse Joint Pain (Arthralgias)] : denies arthralgias [Muscle Aches, Generalized (Myalgias)] : denies myalgias [Yellow Skin Or Eyes (Jaundice)] : denies jaundice [Abdominal Pain] : denies abdominal pain [Urine Tests Nonspecific Abnormal Findings Biliuria] : denies dark urine [Light Colored Bowel Movement (Acholic Stools)] : denies pale stools [Insomnia] : denies insomnia [Skin: Rash] : denies rash [Itching (Pruritus)] : denies pruritus [Needlestick Exposure] : no needlestick exposure [IV Drug Use] : no IV drug use [Tattoo] : no tattoos [de-identified] : 57 yo male patient known to have - Alcohol Use --> cutting down from 3-4 drinks daily to 2-3 drinks in every other day - History of Chronic Hep C s/p eradication  --> Received treatment in 05/2022 Epcliza --> Last Hep C RNA negative 12/2022 - History of Liver Cirrhosis (MELD 3 of 8) and HCC s/p Y90 procedure complicated by celiac dissection --> Had MR liver 11/02/2022 new 1.8cm lesion segment 4, treated 4.7 right hepatic lobe lesions with cavitation  - Had MR 09/02/2023 5.1 x 4.9 cm segment 6/7 lesion compatible with OPTN Class 5T HCC, LI-RADS TR Nonviable; 2.7 x 2.1 cm segment 4B lesion which does not meet the current OPTN/UNOS criteria for HCC, LI-RADS TR Nonviable; New 1.7 x 1.6 cm segment 6/7 lesion which does not meet the current OPTN/UNOS criteria for HCC, LI-RADS 4 (Questionable washout on this lesion); New 1.9 x 1.4 cm segment 2 lesion which does not meet the current OPTN/UNOS criteria for HCC, LI-RADS 3 --> S/P microwave ablation on 01/23/2023 by IR --> AFP 08/29/2023 <1.8 --> CLD ESTELLE 1:640; Smooth muscle Ab 1:20 2021 --> Hepatitis A Ig G + 08/27/2022 s/p 2 vaccine doses --> Hepatitis B total core Ab reactive 02/2022 and Hep B s Ab reactive and Hep B sAg negative 02/2022 -> Hep B DNA PCR detected 08/29/2023 --> Had MR 09/02/2023 5.1 x 4.9 cm segment 6/7 lesion compatible with OPTN Class 5T HCC, LI-RADS TR Nonviable; 2.7 x 2.1 cm segment 4B lesion which does not meet the current OPTN/UNOS criteria for HCC, LI-RADS TR Nonviable; New 1.7 x 1.6 cm segment 6/7 lesion which does not meet the current OPTN/UNOS criteria for HCC, LI-RADS 4 (Questionable washout on this lesion); New 1.9 x 1.4 cm segment 2 lesion which does not meet the current OPTN/UNOS criteria for HCC, LI-RADS 3 --> S/P evaluation by Dr Stiles on 09/08/2023: referred to hepatology for LT evaluation --> Social support: lives with wife; works in wood; daughter in [place]  He is presenting for follow up. Non-compliant with alcohol drinking Patient denies abdominal pain, nausea, vomiting. He denies weight change, change in BMs, jaundice or itchiness. ROS negative for fever or chills.   Ibeth parish

## 2024-01-08 NOTE — END OF VISIT
[] : Resident [Time Spent: ___ minutes] : I have spent [unfilled] minutes of time on the encounter. 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 No symptoms. He reports that is still drinking alcohol but has cut down.  Abdomen soft non tender EGD 2021 showed small varices and started on NSB. On propranolol to 20 mg BID.  No ascites on exam and last imaging. Patient only took 4 weeks of Epclusa and although did not complete hepatitis C treatment achieved SVR 12. HCV RNA undetectable in in December 2022.  Prior Hep B exposure. HBV DNA detectable not estimated viral load HBsAg negative. Hepatitis A - has immunity. MRI abdomen shows segment 2/4a 1.9 cm LIRAD 5 lesion and 1.9 LIRAD 4 segment 6/7 lesion. AFP normal Aug 2023 Repeat MELD labs and  AFP No mets on  CT chest and bone scan.  MELD 3.0 - 8. With recurrent HCC advised LT but does not want to pursue LT evaluation and does not want to  stop alcohol. Colonoscopy - multiple polyps Sep 2021 and repeat in 3 - 5 years Repeat HBV DNA and check quantitative HBsAg F/u in 3 months.

## 2024-01-08 NOTE — PHYSICAL EXAM
[Non-Tender] : non-tender [General Appearance - Alert] : alert [General Appearance - In No Acute Distress] : in no acute distress [General Appearance - Well Nourished] : well nourished [General Appearance - Well-Appearing] : healthy appearing [Sclera] : the sclera and conjunctiva were normal [Outer Ear] : the ears and nose were normal in appearance [Neck Appearance] : the appearance of the neck was normal [Respiration, Rhythm And Depth] : normal respiratory rhythm and effort [Exaggerated Use Of Accessory Muscles For Inspiration] : no accessory muscle use [Auscultation Breath Sounds / Voice Sounds] : lungs were clear to auscultation bilaterally [Chest Palpation] : palpation of the chest revealed no abnormalities [Apical Impulse] : the apical impulse was normal [Heart Rate And Rhythm] : heart rate was normal and rhythm regular [Heart Sounds] : normal S1 and S2 [Murmurs] : no murmurs [Heart Sounds Pericardial Friction Rub] : no pericardial rub [Bowel Sounds] : normal bowel sounds [Abdomen Soft] : soft [Abdomen Tenderness] : non-tender [] : no hepato-splenomegaly [Abdomen Mass (___ Cm)] : no abdominal mass palpated [No CVA Tenderness] : no ~M costovertebral angle tenderness [Abnormal Walk] : normal gait [Skin Color & Pigmentation] : normal skin color and pigmentation [Sensation] : the sensory exam was normal to light touch and pinprick [Scleral Icterus] : No Scleral Icterus [Spider Angioma] : No spider angioma(s) were observed [Abdominal Bruit] : no abdominal bruit [Abdominal  Ascites] : no ascites [Asterixis] : no asterixis observed [Jaundice] : No jaundice [Depression] : no depression

## 2024-01-08 NOTE — REASON FOR VISIT
[Follow-Up: _____] : a [unfilled] follow-up visit [Interpreters_FullName] : Ms Yadira Gates [TWNoteComboBox1] : American Sign Language

## 2024-01-08 NOTE — REVIEW OF SYSTEMS
[Fever] : no fever [Chills] : no chills [Feeling Poorly] : not feeling poorly [Chest Pain] : no chest pain [Palpitations] : no palpitations [Shortness Of Breath] : no shortness of breath [SOB on Exertion] : no shortness of breath during exertion [Abdominal Pain] : no abdominal pain [Vomiting] : no vomiting [Constipation] : no constipation [Diarrhea] : no diarrhea [Heartburn] : no heartburn [Melena] : no melena [Dysuria] : no dysuria [Skin Lesions] : no skin lesions [Itching] : no itching [Confused] : no confusion [Proptosis] : no proptosis

## 2024-01-08 NOTE — ASSESSMENT
[FreeTextEntry1] : 59 yo male patient known to have history of hep C s/p Rx and eradication 12/2022, alcohol use, liver cirrhosis and HCC s/p Y90 procedure and ablation by IR on 01/23/2023, presenting to us for follow up in setting of new liver lesions.   History of Liver Cirrhosis (MELD 3 of 8) and HCC s/p Y90 procedure complicated by celiac dissection History of Chronic Hep C s/p eradication Alcohol Use Disorder * Alcohol Use: cutting down from 3-4 drinks daily to 2-3 drinks in weekends +- in weekdays on occasions * Hep C: Received treatment in 05/2022 Epcliza; Last Hep C RNA negative 12/2022 * Had MR liver 11/02/2022 new 1.8cm lesion segment 4, treated 4.7 right hepatic lobe lesions with cavitation * S/P Y90 procedure with improvement (was on [facility] list for 5 years but then removed his name after improving post procedure) * S/P microwave ablation on 01/23/2023 by IR * Had MR 09/02/2023 5.1 x 4.9 cm segment 6/7 lesion compatible with OPTN Class 5T HCC, LI-RADS TR Nonviable; 2.7 x 2.1 cm segment 4B lesion which does not meet the current OPTN/UNOS criteria for HCC, LI-RADS TR Nonviable; New 1.7 x 1.6 cm segment 6/7 lesion which does not meet the current OPTN/UNOS criteria for HCC, LI-RADS 4 (Questionable washout on this lesion); New 1.9 x 1.4 cm segment 2 lesion which does not meet the current OPTN/UNOS criteria for HCC, LI-RADS 3 * Had MR 12/2/2023: Liver cirrhosis with portal hypertension. 2. Liver segment II/Philipp 1.9 cm HCC with arterial hyperenhancement, new portal venous washout, new pseudocapsule, now LI-RADS 5/OPTN 5A previously LI-RADS 3. 3. Previously described liver segment 6/7 LI-RADS 4 lesion is approximately 0.7 cm upon remeasurement, unchanged with similar measurement technique, and again demonstrates arterial hyperenhancement, questionable portal venous washout without pseudocapsule, remaining LI-RADS 4, OPTN: Does not meet OPTN criteria. 4. Unchanged 5 cm segment 6/7 and 3 cm segment 4B 100% necrotic treatment cavities OPTN 5T, LI-RADS TR Nonviable * Bone scan 10/2023: No mets * S/P evaluation by Dr Dukes on 09/08/2023: referred to hepatology for LT evaluation * Social support: lives with wife; works in wood; daughter in [place] * AFP 08/29/2023 <1.8 * CLD ESTELLE 1:640; Smooth muscle Ab 1:20 2021 * LFTs 08/29/2023 1.6/181/54/55; Alb 4.2, INR 1.01; Plt 98, Hb 15.1, WBC 5.53 * Hepatitis A Ig G + 08/27/2022 s/p 2 vaccine doses * Hepatitis B total core Ab reactive 02/2022 and Hep B s Ab reactive and Hep B sAg negative 02/2022 -> Hep B DNA PCR detected 08/29/2023 * Discussed in tumour board, was supposed to go to [facility] for [facility] eval but patient refused to go.     Plan: -Will repeat AFP and check AFP L3 in 01/2024 -Educated about need to quit alcohol; will need alcohol rehab to help in quitting -PEC level -Hep B Ag quantitative -HBV DNA -MELD labs- CMP/PT/INR -Repeat EGD for screening

## 2024-01-11 ENCOUNTER — OUTPATIENT (OUTPATIENT)
Dept: OUTPATIENT SERVICES | Facility: HOSPITAL | Age: 59
LOS: 1 days | End: 2024-01-11
Payer: MEDICAID

## 2024-01-11 VITALS
HEIGHT: 60 IN | WEIGHT: 196.21 LBS | TEMPERATURE: 98 F | SYSTOLIC BLOOD PRESSURE: 138 MMHG | RESPIRATION RATE: 18 BRPM | DIASTOLIC BLOOD PRESSURE: 74 MMHG | OXYGEN SATURATION: 98 % | HEART RATE: 86 BPM

## 2024-01-11 DIAGNOSIS — F10.90 ALCOHOL USE, UNSPECIFIED, UNCOMPLICATED: ICD-10-CM

## 2024-01-11 DIAGNOSIS — K74.60 UNSPECIFIED CIRRHOSIS OF LIVER: ICD-10-CM

## 2024-01-11 DIAGNOSIS — E66.9 OBESITY, UNSPECIFIED: ICD-10-CM

## 2024-01-11 DIAGNOSIS — Z01.818 ENCOUNTER FOR OTHER PREPROCEDURAL EXAMINATION: ICD-10-CM

## 2024-01-11 DIAGNOSIS — Z86.19 PERSONAL HISTORY OF OTHER INFECTIOUS AND PARASITIC DISEASES: ICD-10-CM

## 2024-01-11 DIAGNOSIS — C22.0 LIVER CELL CARCINOMA: ICD-10-CM

## 2024-01-11 DIAGNOSIS — D49.0 NEOPLASM OF UNSPECIFIED BEHAVIOR OF DIGESTIVE SYSTEM: Chronic | ICD-10-CM

## 2024-01-11 LAB
ALBUMIN SERPL ELPH-MCNC: 4.2 G/DL — SIGNIFICANT CHANGE UP (ref 3.5–5.2)
ALBUMIN SERPL ELPH-MCNC: 4.2 G/DL — SIGNIFICANT CHANGE UP (ref 3.5–5.2)
ALP SERPL-CCNC: 161 U/L — HIGH (ref 30–115)
ALP SERPL-CCNC: 161 U/L — HIGH (ref 30–115)
ALT FLD-CCNC: 90 U/L — HIGH (ref 0–41)
ALT FLD-CCNC: 90 U/L — HIGH (ref 0–41)
ANION GAP SERPL CALC-SCNC: 13 MMOL/L — SIGNIFICANT CHANGE UP (ref 7–14)
ANION GAP SERPL CALC-SCNC: 13 MMOL/L — SIGNIFICANT CHANGE UP (ref 7–14)
APTT BLD: 35.7 SEC — SIGNIFICANT CHANGE UP (ref 27–39.2)
APTT BLD: 35.7 SEC — SIGNIFICANT CHANGE UP (ref 27–39.2)
AST SERPL-CCNC: 103 U/L — HIGH (ref 0–41)
AST SERPL-CCNC: 103 U/L — HIGH (ref 0–41)
BASOPHILS # BLD AUTO: 0.04 K/UL — SIGNIFICANT CHANGE UP (ref 0–0.2)
BASOPHILS # BLD AUTO: 0.04 K/UL — SIGNIFICANT CHANGE UP (ref 0–0.2)
BASOPHILS NFR BLD AUTO: 0.7 % — SIGNIFICANT CHANGE UP (ref 0–1)
BASOPHILS NFR BLD AUTO: 0.7 % — SIGNIFICANT CHANGE UP (ref 0–1)
BILIRUB SERPL-MCNC: 1.7 MG/DL — HIGH (ref 0.2–1.2)
BILIRUB SERPL-MCNC: 1.7 MG/DL — HIGH (ref 0.2–1.2)
BUN SERPL-MCNC: 10 MG/DL — SIGNIFICANT CHANGE UP (ref 10–20)
BUN SERPL-MCNC: 10 MG/DL — SIGNIFICANT CHANGE UP (ref 10–20)
CALCIUM SERPL-MCNC: 9.8 MG/DL — SIGNIFICANT CHANGE UP (ref 8.4–10.5)
CALCIUM SERPL-MCNC: 9.8 MG/DL — SIGNIFICANT CHANGE UP (ref 8.4–10.5)
CHLORIDE SERPL-SCNC: 105 MMOL/L — SIGNIFICANT CHANGE UP (ref 98–110)
CHLORIDE SERPL-SCNC: 105 MMOL/L — SIGNIFICANT CHANGE UP (ref 98–110)
CO2 SERPL-SCNC: 20 MMOL/L — SIGNIFICANT CHANGE UP (ref 17–32)
CO2 SERPL-SCNC: 20 MMOL/L — SIGNIFICANT CHANGE UP (ref 17–32)
CREAT SERPL-MCNC: 0.6 MG/DL — LOW (ref 0.7–1.5)
CREAT SERPL-MCNC: 0.6 MG/DL — LOW (ref 0.7–1.5)
EGFR: 113 ML/MIN/1.73M2 — SIGNIFICANT CHANGE UP
EGFR: 113 ML/MIN/1.73M2 — SIGNIFICANT CHANGE UP
EOSINOPHIL # BLD AUTO: 0.03 K/UL — SIGNIFICANT CHANGE UP (ref 0–0.7)
EOSINOPHIL # BLD AUTO: 0.03 K/UL — SIGNIFICANT CHANGE UP (ref 0–0.7)
EOSINOPHIL NFR BLD AUTO: 0.5 % — SIGNIFICANT CHANGE UP (ref 0–8)
EOSINOPHIL NFR BLD AUTO: 0.5 % — SIGNIFICANT CHANGE UP (ref 0–8)
GLUCOSE SERPL-MCNC: 185 MG/DL — HIGH (ref 70–99)
GLUCOSE SERPL-MCNC: 185 MG/DL — HIGH (ref 70–99)
HCT VFR BLD CALC: 44.8 % — SIGNIFICANT CHANGE UP (ref 42–52)
HCT VFR BLD CALC: 44.8 % — SIGNIFICANT CHANGE UP (ref 42–52)
HGB BLD-MCNC: 15.9 G/DL — SIGNIFICANT CHANGE UP (ref 14–18)
HGB BLD-MCNC: 15.9 G/DL — SIGNIFICANT CHANGE UP (ref 14–18)
IMM GRANULOCYTES NFR BLD AUTO: 0.5 % — HIGH (ref 0.1–0.3)
IMM GRANULOCYTES NFR BLD AUTO: 0.5 % — HIGH (ref 0.1–0.3)
INR BLD: 1.13 RATIO — SIGNIFICANT CHANGE UP (ref 0.65–1.3)
INR BLD: 1.13 RATIO — SIGNIFICANT CHANGE UP (ref 0.65–1.3)
LYMPHOCYTES # BLD AUTO: 1.47 K/UL — SIGNIFICANT CHANGE UP (ref 1.2–3.4)
LYMPHOCYTES # BLD AUTO: 1.47 K/UL — SIGNIFICANT CHANGE UP (ref 1.2–3.4)
LYMPHOCYTES # BLD AUTO: 26.9 % — SIGNIFICANT CHANGE UP (ref 20.5–51.1)
LYMPHOCYTES # BLD AUTO: 26.9 % — SIGNIFICANT CHANGE UP (ref 20.5–51.1)
MCHC RBC-ENTMCNC: 33.1 PG — HIGH (ref 27–31)
MCHC RBC-ENTMCNC: 33.1 PG — HIGH (ref 27–31)
MCHC RBC-ENTMCNC: 35.5 G/DL — SIGNIFICANT CHANGE UP (ref 32–37)
MCHC RBC-ENTMCNC: 35.5 G/DL — SIGNIFICANT CHANGE UP (ref 32–37)
MCV RBC AUTO: 93.1 FL — SIGNIFICANT CHANGE UP (ref 80–94)
MCV RBC AUTO: 93.1 FL — SIGNIFICANT CHANGE UP (ref 80–94)
MONOCYTES # BLD AUTO: 0.52 K/UL — SIGNIFICANT CHANGE UP (ref 0.1–0.6)
MONOCYTES # BLD AUTO: 0.52 K/UL — SIGNIFICANT CHANGE UP (ref 0.1–0.6)
MONOCYTES NFR BLD AUTO: 9.5 % — HIGH (ref 1.7–9.3)
MONOCYTES NFR BLD AUTO: 9.5 % — HIGH (ref 1.7–9.3)
NEUTROPHILS # BLD AUTO: 3.38 K/UL — SIGNIFICANT CHANGE UP (ref 1.4–6.5)
NEUTROPHILS # BLD AUTO: 3.38 K/UL — SIGNIFICANT CHANGE UP (ref 1.4–6.5)
NEUTROPHILS NFR BLD AUTO: 61.9 % — SIGNIFICANT CHANGE UP (ref 42.2–75.2)
NEUTROPHILS NFR BLD AUTO: 61.9 % — SIGNIFICANT CHANGE UP (ref 42.2–75.2)
NRBC # BLD: 0 /100 WBCS — SIGNIFICANT CHANGE UP (ref 0–0)
NRBC # BLD: 0 /100 WBCS — SIGNIFICANT CHANGE UP (ref 0–0)
PLATELET # BLD AUTO: 108 K/UL — LOW (ref 130–400)
PLATELET # BLD AUTO: 108 K/UL — LOW (ref 130–400)
PMV BLD: 10.3 FL — SIGNIFICANT CHANGE UP (ref 7.4–10.4)
PMV BLD: 10.3 FL — SIGNIFICANT CHANGE UP (ref 7.4–10.4)
POTASSIUM SERPL-MCNC: 4.1 MMOL/L — SIGNIFICANT CHANGE UP (ref 3.5–5)
POTASSIUM SERPL-MCNC: 4.1 MMOL/L — SIGNIFICANT CHANGE UP (ref 3.5–5)
POTASSIUM SERPL-SCNC: 4.1 MMOL/L — SIGNIFICANT CHANGE UP (ref 3.5–5)
POTASSIUM SERPL-SCNC: 4.1 MMOL/L — SIGNIFICANT CHANGE UP (ref 3.5–5)
PROT SERPL-MCNC: 7.1 G/DL — SIGNIFICANT CHANGE UP (ref 6–8)
PROT SERPL-MCNC: 7.1 G/DL — SIGNIFICANT CHANGE UP (ref 6–8)
PROTHROM AB SERPL-ACNC: 12.9 SEC — HIGH (ref 9.95–12.87)
PROTHROM AB SERPL-ACNC: 12.9 SEC — HIGH (ref 9.95–12.87)
RBC # BLD: 4.81 M/UL — SIGNIFICANT CHANGE UP (ref 4.7–6.1)
RBC # BLD: 4.81 M/UL — SIGNIFICANT CHANGE UP (ref 4.7–6.1)
RBC # FLD: 12.9 % — SIGNIFICANT CHANGE UP (ref 11.5–14.5)
RBC # FLD: 12.9 % — SIGNIFICANT CHANGE UP (ref 11.5–14.5)
SODIUM SERPL-SCNC: 138 MMOL/L — SIGNIFICANT CHANGE UP (ref 135–146)
SODIUM SERPL-SCNC: 138 MMOL/L — SIGNIFICANT CHANGE UP (ref 135–146)
WBC # BLD: 5.47 K/UL — SIGNIFICANT CHANGE UP (ref 4.8–10.8)
WBC # BLD: 5.47 K/UL — SIGNIFICANT CHANGE UP (ref 4.8–10.8)
WBC # FLD AUTO: 5.47 K/UL — SIGNIFICANT CHANGE UP (ref 4.8–10.8)
WBC # FLD AUTO: 5.47 K/UL — SIGNIFICANT CHANGE UP (ref 4.8–10.8)

## 2024-01-11 PROCEDURE — 93010 ELECTROCARDIOGRAM REPORT: CPT

## 2024-01-11 PROCEDURE — 99214 OFFICE O/P EST MOD 30 MIN: CPT | Mod: 25

## 2024-01-11 PROCEDURE — 80053 COMPREHEN METABOLIC PANEL: CPT

## 2024-01-11 PROCEDURE — 36415 COLL VENOUS BLD VENIPUNCTURE: CPT

## 2024-01-11 PROCEDURE — 93005 ELECTROCARDIOGRAM TRACING: CPT

## 2024-01-11 PROCEDURE — 85025 COMPLETE CBC W/AUTO DIFF WBC: CPT

## 2024-01-11 PROCEDURE — 85730 THROMBOPLASTIN TIME PARTIAL: CPT

## 2024-01-11 PROCEDURE — 85610 PROTHROMBIN TIME: CPT

## 2024-01-11 NOTE — H&P PST ADULT - NSICDXPASTMEDICALHX_GEN_ALL_CORE_FT
PAST MEDICAL HISTORY:  DM (diabetes mellitus)     Hepatocellular carcinoma     History of hepatitis C     HTN (hypertension)     Liver cancer

## 2024-01-11 NOTE — H&P PST ADULT - NSANTHOSAYNRD_GEN_A_CORE
DIFFICULT TO ASSESS BOTH PATIENT AND BED PARTNER ARE DEAF/No. SILVERIO screening performed.  STOP BANG Legend: 0-2 = LOW Risk; 3-4 = INTERMEDIATE Risk; 5-8 = HIGH Risk

## 2024-01-11 NOTE — H&P PST ADULT - REASON FOR ADMISSION
Suite: Interventional RadiologyProceduralist: Ryan Spear Surgery DateTime: 01- - 0:00PAST DateTime: 01- - 0:00Procedure: Liver Ablation  ERP?: UnavailableLaterality: N/ALength of Procedure: 60 Minutes  Anesthesia Type: General

## 2024-01-11 NOTE — H&P PST ADULT - HISTORY OF PRESENT ILLNESS
PT DEAF - HX VIA  ALIX SOLIS OF HEP C   REPORTEDLY ADVISED TO HAVE LIVER TRANSPLANT AND DECLINED   GOING FOR LIVER ABLATION     PATIENT CURRENTLY DENIES CHEST PAIN    PALPITATIONS,  DYSURIA, OR URI WITHIN THE IN PAST 2 WEEKS    -Denies travel outside the [place] in the past 30 days  -Patient denies any signs or symptoms of COVID 19 and denies contact with known positive individuals.    -Patient was instructed to quarantine pre-procedure, practice exposure control measures, continue to self-monitor and report any concerns to their proceduralist.  -Pt advised self quarantine till day of procedure    ANESTHESIA ALERT  yes--Difficult Airway  NO--History of neck surgery or radiation  NO--Limited ROM of neck  NO--History of Malignant hyperthermia  NO--No personal or family history of Pseudocholinesterase deficiency.  NO--Prior Anesthesia Complication  NO--Latex Allergy  NO--Loose teeth  NO--History of Rheumatoid Arthritis  NO--Bleeding risk  (HX LIVER DISEASE)  NO--SILVERIO  (unsure-BED PARTNER DEAF ALSO)  NO--Implants  NO--Other_____    -PT DENIES ANY RASHES, ABRASION, OR OPEN WOUNDS     -Pt denies any suicidal ideation or thoughts.  Pt states not a threat to self or others.  DENIES DOMESTIC VIOLENCE      AS PER THE PT, THIS IS HIS/HER COMPLETE MEDICAL AND SURGICAL HX, INCLUDING MEDICATIONS PRESCRIBED AND OVER THE COUNTER    Patient verbalized understanding of instructions and was given the opportunity to ask questions and have them answered.    RCRI 0     CLASS RISK I     3.9  Duke Activity Status Index (DASI) from [employer]  on 1/11/2024  ** All calculations should be rechecked by clinician prior to use **    RESULT SUMMARY:  58.2 points  The higher the score (maximum 58.2), the higher the functional status.    9.89 METs        INPUTS:  Take care of self —> 2.75 = Yes  Walk indoors —> 1.75 = Yes  Walk 1&ndash;2 blocks on level ground —> 2.75 = Yes  Climb a flight of stairs or walk up a hill —> 5.5 = Yes  Run a short distance —> 8 = Yes  Do light work around the house —> 2.7 = Yes  Do moderate work around the house —> 3.5 = Yes  Do heavy work around the house —> 8 = Yes  Do yardwork —> 4.5 = Yes  Have sexual relations —> 5.25 = Yes  Participate in moderate recreational activities —> 6 = Yes  Participate in strenuous sports —> 7.5 = Yes       PT DEAF - HX VIA  ALIX SOLIS OF HEP C   REPORTEDLY ADVISED TO HAVE LIVER TRANSPLANT AND DECLINED   GOING FOR LIVER ABLATION     PATIENT CURRENTLY DENIES CHEST PAIN    PALPITATIONS,  DYSURIA, OR URI WITHIN THE IN PAST 2 WEEKS    -Denies travel outside the [place] in the past 30 days  -Patient denies any signs or symptoms of COVID 19 and denies contact with known positive individuals.    -Patient was instructed to quarantine pre-procedure, practice exposure control measures, continue to self-monitor and report any concerns to their proceduralist.  -Pt advised self quarantine till day of procedure    ANESTHESIA ALERT  yes--Difficult Airway  NO--History of neck surgery or radiation  NO--Limited ROM of neck  NO--History of Malignant hyperthermia  NO--No personal or family history of Pseudocholinesterase deficiency.  NO--Prior Anesthesia Complication  NO--Latex Allergy  NO--Loose teeth  NO--History of Rheumatoid Arthritis  NO--Bleeding risk  (HX LIVER DISEASE)  NO--SILVERIO  (unsure-BED PARTNER DEAF ALSO)  NO--Implants  NO--Other_____    -PT DENIES ANY RASHES, ABRASION, OR OPEN WOUNDS     -Pt denies any suicidal ideation or thoughts.  Pt states not a threat to self or others.  DENIES DOMESTIC VIOLENCE      AS PER THE PT, THIS IS HIS/HER COMPLETE MEDICAL AND SURGICAL HX, INCLUDING MEDICATIONS PRESCRIBED AND OVER THE COUNTER    Patient verbalized understanding of instructions and was given the opportunity to ask questions and have them answered.    RCRI 0     CLASS RISK I     3.9  Duke Activity Status Index (DASI) from [employer]  on 1/11/2024  ** All calculations should be rechecked by clinician prior to use **    RESULT SUMMARY:  58.2 points  The higher the score (maximum 58.2), the higher the functional status.    9.89 METs        INPUTS:  Take care of self —> 2.75 = Yes  Walk indoors —> 1.75 = Yes  Walk 1&ndash;2 blocks on level ground —> 2.75 = Yes  Climb a flight of stairs or walk up a hill —> 5.5 = Yes  Run a short distance —> 8 = Yes  Do light work around the house —> 2.7 = Yes  Do moderate work around the house —> 3.5 = Yes  Do heavy work around the house —> 8 = Yes  Do yardwork —> 4.5 = Yes  Have sexual relations —> 5.25 = Yes  Participate in moderate recreational activities —> 6 = Yes  Participate in strenuous sports —> 7.5 = Yes

## 2024-01-12 DIAGNOSIS — C22.0 LIVER CELL CARCINOMA: ICD-10-CM

## 2024-01-12 DIAGNOSIS — Z01.818 ENCOUNTER FOR OTHER PREPROCEDURAL EXAMINATION: ICD-10-CM

## 2024-01-25 ENCOUNTER — RESULT REVIEW (OUTPATIENT)
Age: 59
End: 2024-01-25

## 2024-01-25 ENCOUNTER — TRANSCRIPTION ENCOUNTER (OUTPATIENT)
Age: 59
End: 2024-01-25

## 2024-01-25 ENCOUNTER — OUTPATIENT (OUTPATIENT)
Dept: OUTPATIENT SERVICES | Facility: HOSPITAL | Age: 59
LOS: 1 days | Discharge: HOME | End: 2024-01-25
Payer: MEDICAID

## 2024-01-25 VITALS
DIASTOLIC BLOOD PRESSURE: 73 MMHG | OXYGEN SATURATION: 95 % | SYSTOLIC BLOOD PRESSURE: 133 MMHG | RESPIRATION RATE: 16 BRPM | HEART RATE: 98 BPM

## 2024-01-25 VITALS
HEIGHT: 69 IN | DIASTOLIC BLOOD PRESSURE: 74 MMHG | HEART RATE: 87 BPM | OXYGEN SATURATION: 97 % | SYSTOLIC BLOOD PRESSURE: 124 MMHG | TEMPERATURE: 98 F | RESPIRATION RATE: 18 BRPM | WEIGHT: 196.21 LBS

## 2024-01-25 DIAGNOSIS — D49.0 NEOPLASM OF UNSPECIFIED BEHAVIOR OF DIGESTIVE SYSTEM: Chronic | ICD-10-CM

## 2024-01-25 DIAGNOSIS — C22.0 LIVER CELL CARCINOMA: ICD-10-CM

## 2024-01-25 LAB
GLUCOSE BLDC GLUCOMTR-MCNC: 196 MG/DL — HIGH (ref 70–99)
GLUCOSE BLDC GLUCOMTR-MCNC: 242 MG/DL — HIGH (ref 70–99)
GLUCOSE BLDC GLUCOMTR-MCNC: 257 MG/DL — HIGH (ref 70–99)
GLUCOSE BLDC GLUCOMTR-MCNC: 286 MG/DL — HIGH (ref 70–99)

## 2024-01-25 PROCEDURE — C1886: CPT

## 2024-01-25 PROCEDURE — 47382 PERCUT ABLATE LIVER RF: CPT

## 2024-01-25 PROCEDURE — 77013 CT GUIDE FOR TISSUE ABLATION: CPT

## 2024-01-25 PROCEDURE — 77012 CT SCAN FOR NEEDLE BIOPSY: CPT

## 2024-01-25 PROCEDURE — C9399: CPT

## 2024-01-25 PROCEDURE — 77013 CT GUIDE FOR TISSUE ABLATION: CPT | Mod: 26

## 2024-01-25 PROCEDURE — 82962 GLUCOSE BLOOD TEST: CPT

## 2024-01-25 RX ORDER — INSULIN LISPRO 100/ML
10 VIAL (ML) SUBCUTANEOUS ONCE
Refills: 0 | Status: COMPLETED | OUTPATIENT
Start: 2024-01-25 | End: 2024-01-25

## 2024-01-25 RX ADMIN — Medication 10 UNIT(S): at 07:35

## 2024-01-25 NOTE — PROGRESS NOTE ADULT - SUBJECTIVE AND OBJECTIVE BOX
INTERVENTIONAL RADIOLOGY BRIEF-OPERATIVE NOTE    Procedure: Hepatic lesion microwave ablation   Pre-Op Diagnosis: Hepatic lesion, hx of HCC  Post-Op Diagnosis: same   Attending: Go   Resident: Gigi Mcnamara  Anesthesia (type):  [x] General Anesthesia - as provided by present anesthesiologist   [ ] Sedation  [ ] Spinal Anesthesia  [ ] Local/Regional    Contrast: 200 cc   Estimated Blood Loss: Minimal, < 5 cc  Condition:   [ ] Critical  [ ] Serious  [ ] Fair   [x] Good    Findings/Follow up Plan of Care: Successful image guided single probe microwave ablation of left hepatic lobe lesion.   Specimens Removed: none   Implants: none   Complications: none immediate     Disposition: recovery then home if stable.     Please call Interventional Radiology [phone] with any questions, concerns, or issues.         INTERVENTIONAL RADIOLOGY BRIEF-OPERATIVE NOTE    Procedure: Hepatic lesion microwave ablation   Pre-Op Diagnosis: Hepatic lesion, hx of HCC  Post-Op Diagnosis: same   Attending: Go   Resident: Gigi Mcnamara  Anesthesia (type):  [x] General Anesthesia - as provided by present anesthesiologist   [ ] Sedation  [ ] Spinal Anesthesia  [ ] Local/Regional    Contrast: 200 cc IV contrast to delineate the lesion  Estimated Blood Loss: Minimal, < 5 cc  Condition:   [ ] Critical  [ ] Serious  [ ] Fair   [x] Good    Findings/Follow up Plan of Care: Successful image guided single probe microwave ablation of left hepatic lobe lesion.   Specimens Removed: none   Implants: none   Complications: none immediate     Disposition: recovery then home if stable.     Please call Interventional Radiology [phone] with any questions, concerns, or issues.

## 2024-01-25 NOTE — ASU DISCHARGE PLAN (ADULT/PEDIATRIC) - NS MD DC FALL RISK RISK
For information on Fall & Injury Prevention, visit: [email or web address] OR  [email or web address] OR  https://www.cdc.gov/steadi/patient.html

## 2024-01-25 NOTE — PROGRESS NOTE ADULT - SUBJECTIVE AND OBJECTIVE BOX
PREOPERATIVE DAY OF PROCEDURE EVALUATION:     I have personally seen and examined this patient. I agree with the history and physical which I have reviewed and noted any changes below:     Plan is for image guided liver mass ablation with general anesthesia today 1/25  H+P from PST 1/11 - no changes     Procedure/ risks/ benefits/ goals/ alternatives were explained. All questions answered. Informed content obtained from patient. Consent placed in chart.

## 2024-01-29 PROBLEM — I10 ESSENTIAL (PRIMARY) HYPERTENSION: Chronic | Status: ACTIVE | Noted: 2024-01-11

## 2024-02-02 DIAGNOSIS — D49.0 NEOPLASM OF UNSPECIFIED BEHAVIOR OF DIGESTIVE SYSTEM: ICD-10-CM

## 2024-02-02 DIAGNOSIS — K74.60 UNSPECIFIED CIRRHOSIS OF LIVER: ICD-10-CM

## 2024-02-08 ENCOUNTER — RX RENEWAL (OUTPATIENT)
Age: 59
End: 2024-02-08

## 2024-02-16 ENCOUNTER — APPOINTMENT (OUTPATIENT)
Dept: INTERVENTIONAL RADIOLOGY/VASCULAR | Facility: CLINIC | Age: 59
End: 2024-02-16
Payer: MEDICAID

## 2024-02-16 VITALS
SYSTOLIC BLOOD PRESSURE: 114 MMHG | HEART RATE: 83 BPM | OXYGEN SATURATION: 99 % | DIASTOLIC BLOOD PRESSURE: 80 MMHG | TEMPERATURE: 97.8 F

## 2024-02-16 DIAGNOSIS — Z85.05 PERSONAL HISTORY OF MALIGNANT NEOPLASM OF LIVER: ICD-10-CM

## 2024-02-16 PROCEDURE — 99213 OFFICE O/P EST LOW 20 MIN: CPT

## 2024-02-20 ENCOUNTER — TRANSCRIPTION ENCOUNTER (OUTPATIENT)
Age: 59
End: 2024-02-20

## 2024-02-20 RX ORDER — SEMAGLUTIDE 0.68 MG/ML
1 INJECTION, SOLUTION SUBCUTANEOUS
Refills: 0 | DISCHARGE

## 2024-02-20 RX ORDER — ATORVASTATIN CALCIUM 80 MG/1
1 TABLET, FILM COATED ORAL
Refills: 0 | DISCHARGE

## 2024-02-20 RX ORDER — LISINOPRIL 2.5 MG/1
1 TABLET ORAL
Refills: 0 | DISCHARGE

## 2024-02-20 RX ORDER — EMPAGLIFLOZIN 10 MG/1
1 TABLET, FILM COATED ORAL
Refills: 0 | DISCHARGE

## 2024-03-01 PROBLEM — Z85.05 HISTORY OF HEPATOCELLULAR CARCINOMA: Status: ACTIVE | Noted: 2022-01-31

## 2024-03-01 NOTE — ASSESSMENT
[FreeTextEntry1] : 58 year-old male s/p biopsy and ablation of liver mass.  Plan:  MRI with and without in 3 months to follow up treated lesion and other lesion (s)

## 2024-03-01 NOTE — HISTORY OF PRESENT ILLNESS
[FreeTextEntry1] : 58 year-old male with history of Hep C/cirrhosis, HCC treated with Y-90 in the past and and microwave ablation.  He is no longer following with the transplant team because it is too difficult for him to drive there and is not interested.  He is doing well without any pain.  Imaging shows nonviable treatment cavities but he now has new1.9 cm Lirads 5 lesion in segment II/Philipp and 0.7 cm Lirads 4 lesion in 6/7.  He has been noncompliant with his drinking.  He is now s/p microwave ablation of the Lirads 5 lesion in II/Philipp.  He is doing well without any complaints.  sign language interpretor here for interpretation today

## 2024-03-11 ENCOUNTER — RX RENEWAL (OUTPATIENT)
Age: 59
End: 2024-03-11

## 2024-03-25 NOTE — ASU PREOP CHECKLIST - PATIENT SENT TO
March 25, 2024     Patient: Marva Burton  YOB: 2015  Date of Visit: 3/25/2024      To Whom it May Concern:    Marva Burton is under my professional care. Marva was seen in my office on 3/25/2024. Marva school when 24 hours fever free.    If you have any questions or concerns, please don't hesitate to call.         Sincerely,          Felicity uJarez PA-C        CC: No Recipients  
endoscopy

## 2024-03-29 ENCOUNTER — RX RENEWAL (OUTPATIENT)
Age: 59
End: 2024-03-29

## 2024-03-29 RX ORDER — NYSTATIN 100000 1/G
100000 POWDER TOPICAL
Qty: 60 | Refills: 2 | Status: ACTIVE | COMMUNITY
Start: 2023-03-14 | End: 1900-01-01

## 2024-03-30 ENCOUNTER — RX RENEWAL (OUTPATIENT)
Age: 59
End: 2024-03-30

## 2024-03-30 RX ORDER — CLOTRIMAZOLE 10 MG/G
1 CREAM TOPICAL
Qty: 85 | Refills: 4 | Status: ACTIVE | COMMUNITY
Start: 2023-06-15 | End: 1900-01-01

## 2024-04-15 ENCOUNTER — APPOINTMENT (OUTPATIENT)
Dept: INTERNAL MEDICINE | Facility: CLINIC | Age: 59
End: 2024-04-15

## 2024-05-22 ENCOUNTER — OUTPATIENT (OUTPATIENT)
Dept: OUTPATIENT SERVICES | Facility: HOSPITAL | Age: 59
LOS: 1 days | End: 2024-05-22
Payer: MEDICAID

## 2024-05-22 ENCOUNTER — RESULT REVIEW (OUTPATIENT)
Age: 59
End: 2024-05-22

## 2024-05-22 DIAGNOSIS — D49.0 NEOPLASM OF UNSPECIFIED BEHAVIOR OF DIGESTIVE SYSTEM: Chronic | ICD-10-CM

## 2024-05-22 DIAGNOSIS — K74.60 UNSPECIFIED CIRRHOSIS OF LIVER: ICD-10-CM

## 2024-05-22 PROCEDURE — A9579: CPT

## 2024-05-22 PROCEDURE — 74183 MRI ABD W/O CNTR FLWD CNTR: CPT

## 2024-05-22 PROCEDURE — 74183 MRI ABD W/O CNTR FLWD CNTR: CPT | Mod: 26

## 2024-05-23 DIAGNOSIS — K74.60 UNSPECIFIED CIRRHOSIS OF LIVER: ICD-10-CM

## 2024-05-31 ENCOUNTER — RX RENEWAL (OUTPATIENT)
Age: 59
End: 2024-05-31

## 2024-05-31 RX ORDER — HYDROCORTISONE 2 G/100G
2 GEL TOPICAL
Qty: 1 | Refills: 1 | Status: ACTIVE | COMMUNITY
Start: 2023-10-12 | End: 1900-01-01

## 2024-06-07 ENCOUNTER — APPOINTMENT (OUTPATIENT)
Dept: INTERVENTIONAL RADIOLOGY/VASCULAR | Facility: CLINIC | Age: 59
End: 2024-06-07
Payer: MEDICAID

## 2024-06-07 VITALS
TEMPERATURE: 98 F | HEART RATE: 85 BPM | DIASTOLIC BLOOD PRESSURE: 76 MMHG | SYSTOLIC BLOOD PRESSURE: 118 MMHG | OXYGEN SATURATION: 98 %

## 2024-06-07 DIAGNOSIS — K74.60 UNSPECIFIED CIRRHOSIS OF LIVER: ICD-10-CM

## 2024-06-07 PROCEDURE — 99214 OFFICE O/P EST MOD 30 MIN: CPT

## 2024-06-07 NOTE — PHYSICAL EXAM
[No Respiratory Distress] : no respiratory distress [Normal Rate and Effort] : normal respiratory rhythm and effort [Not Tender] : non-tender [Soft] : abdomen soft

## 2024-06-07 NOTE — ASSESSMENT
[FreeTextEntry1] : 58 year-old male with HCC s/p treatment.  Plan:  MRI in 3 months, will consider treating other LIRADS 4 area at some point  Hepatology follow up

## 2024-06-07 NOTE — HISTORY OF PRESENT ILLNESS
[FreeTextEntry1] : 58 year-old male with history of Hep C/cirrhosis, HCC treated with Y-90 in the past and and microwave ablation.  He is no longer following with the transplant team because it is too difficult for him to drive there and is not interested.  He is doing well without any pain.  Imaging shows nonviable treatment cavities but he now has new1.9 cm Lirads 5 lesion in segment II/Philipp and 0.7 cm Lirads 4 lesion in 6/7.  He has been noncompliant with his drinking.  He is now s/p microwave ablation of the Lirads 5 lesion in II/Philipp.  He is doing well without any complaints.  sign language interpretor here for interpretation today (Yadira)  Repeat MRI shows no viable tumor in any of the treated cavities.  Still LIRADS 4 lesion in 6/7 will eventually need to be treated.

## 2024-06-20 NOTE — ASU PATIENT PROFILE, ADULT - PAIN CHRONIC, PROFILE
Detail Level: Detailed When Should The Patient Follow-Up For Their Next Full-Body Skin Exam?: 1 Year Quality 137: Melanoma: Continuity Of Care - Recall System: Recall system not utilized, reason not otherwise specified no

## 2024-07-15 ENCOUNTER — EMERGENCY (EMERGENCY)
Facility: HOSPITAL | Age: 59
LOS: 0 days | Discharge: HOME | End: 2024-07-15
Attending: EMERGENCY MEDICINE
Payer: MEDICAID

## 2024-07-15 VITALS
TEMPERATURE: 99 F | DIASTOLIC BLOOD PRESSURE: 73 MMHG | HEIGHT: 69 IN | RESPIRATION RATE: 18 BRPM | HEART RATE: 94 BPM | SYSTOLIC BLOOD PRESSURE: 108 MMHG | WEIGHT: 203.05 LBS | OXYGEN SATURATION: 97 %

## 2024-07-15 DIAGNOSIS — E11.9 TYPE 2 DIABETES MELLITUS WITHOUT COMPLICATIONS: ICD-10-CM
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
## 2024-07-15 DIAGNOSIS — R07.81 PLEURODYNIA: ICD-10-CM

## 2024-07-15 DIAGNOSIS — I10 ESSENTIAL (PRIMARY) HYPERTENSION: ICD-10-CM
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
## 2024-07-15 DIAGNOSIS — D49.0 NEOPLASM OF UNSPECIFIED BEHAVIOR OF DIGESTIVE SYSTEM: Chronic | ICD-10-CM

## 2024-07-15 PROCEDURE — 71250 CT THORAX DX C-: CPT | Mod: 26,MC

## 2024-07-15 PROCEDURE — 99284 EMERGENCY DEPT VISIT MOD MDM: CPT | Mod: 25

## 2024-07-15 PROCEDURE — 99284 EMERGENCY DEPT VISIT MOD MDM: CPT

## 2024-07-15 PROCEDURE — 71250 CT THORAX DX C-: CPT | Mod: MC

## 2024-07-15 PROCEDURE — 71046 X-RAY EXAM CHEST 2 VIEWS: CPT | Mod: 26

## 2024-07-15 PROCEDURE — 71046 X-RAY EXAM CHEST 2 VIEWS: CPT

## 2024-07-15 RX ORDER — MORPHINE SULFATE 100 MG/1
4 TABLET, EXTENDED RELEASE ORAL ONCE
Refills: 0 | Status: DISCONTINUED | OUTPATIENT
Start: 2024-07-15 | End: 2024-07-15

## 2024-07-15 RX ADMIN — MORPHINE SULFATE 4 MILLIGRAM(S): 100 TABLET, EXTENDED RELEASE ORAL at 13:23

## 2024-07-23 ENCOUNTER — RX RENEWAL (OUTPATIENT)
Age: 59
End: 2024-07-23

## 2024-09-08 ENCOUNTER — OUTPATIENT (OUTPATIENT)
Dept: OUTPATIENT SERVICES | Facility: HOSPITAL | Age: 59
LOS: 1 days | End: 2024-09-08
Payer: MEDICAID

## 2024-09-08 DIAGNOSIS — D49.0 NEOPLASM OF UNSPECIFIED BEHAVIOR OF DIGESTIVE SYSTEM: Chronic | ICD-10-CM

## 2024-09-08 DIAGNOSIS — K74.60 UNSPECIFIED CIRRHOSIS OF LIVER: ICD-10-CM

## 2024-09-08 PROCEDURE — 74183 MRI ABD W/O CNTR FLWD CNTR: CPT | Mod: 26

## 2024-09-08 PROCEDURE — A9579: CPT

## 2024-09-08 PROCEDURE — 74183 MRI ABD W/O CNTR FLWD CNTR: CPT

## 2024-09-09 DIAGNOSIS — K74.60 UNSPECIFIED CIRRHOSIS OF LIVER: ICD-10-CM

## 2024-09-13 ENCOUNTER — APPOINTMENT (OUTPATIENT)
Dept: INTERVENTIONAL RADIOLOGY/VASCULAR | Facility: CLINIC | Age: 59
End: 2024-09-13
Payer: MEDICAID

## 2024-09-13 VITALS
SYSTOLIC BLOOD PRESSURE: 120 MMHG | TEMPERATURE: 97.8 F | DIASTOLIC BLOOD PRESSURE: 73 MMHG | HEART RATE: 91 BPM | OXYGEN SATURATION: 100 %

## 2024-09-13 DIAGNOSIS — Z85.05 PERSONAL HISTORY OF MALIGNANT NEOPLASM OF LIVER: ICD-10-CM

## 2024-09-13 PROCEDURE — 99213 OFFICE O/P EST LOW 20 MIN: CPT

## 2024-09-13 NOTE — HISTORY OF PRESENT ILLNESS
[FreeTextEntry1] : 59 year-old male with history of Hep C/cirrhosis, HCC treated with Y-90 in the past and and microwave ablation.  He is no longer following with the transplant team because it is too difficult for him to drive there and is not interested.  He is doing well without any pain.  Imaging shows nonviable treatment cavities but he now has new1.9 cm Lirads 5 lesion in segment II/Philipp and 0.7 cm Lirads 4 lesion in 6/7.  He has been noncompliant with his drinking.  He is now s/p microwave ablation of the Lirads 5 lesion in II/Philipp.  He is doing well without any complaints.  sign language interpretor here for interpretation today (Yadira)  Repeat MRI shows no viable tumor in any of the treated cavities.  Still LIRADS 4/5 lesion in 6/7 will eventually need to be treated, however still small and difficult to see at this point.

## 2024-09-16 ENCOUNTER — RX RENEWAL (OUTPATIENT)
Age: 59
End: 2024-09-16

## 2024-09-25 ENCOUNTER — EMERGENCY (EMERGENCY)
Facility: HOSPITAL | Age: 59
LOS: 0 days | Discharge: HOME | End: 2024-09-25
Attending: EMERGENCY MEDICINE
Payer: MEDICAID

## 2024-09-25 ENCOUNTER — APPOINTMENT (OUTPATIENT)
Dept: INTERNAL MEDICINE | Facility: CLINIC | Age: 59
End: 2024-09-25

## 2024-09-25 ENCOUNTER — OUTPATIENT (OUTPATIENT)
Dept: OUTPATIENT SERVICES | Facility: HOSPITAL | Age: 59
LOS: 1 days | End: 2024-09-25
Payer: MEDICAID

## 2024-09-25 VITALS
HEIGHT: 69 IN | SYSTOLIC BLOOD PRESSURE: 126 MMHG | HEART RATE: 90 BPM | DIASTOLIC BLOOD PRESSURE: 80 MMHG | OXYGEN SATURATION: 96 % | WEIGHT: 196 LBS | BODY MASS INDEX: 29.03 KG/M2 | TEMPERATURE: 97.7 F

## 2024-09-25 VITALS
DIASTOLIC BLOOD PRESSURE: 74 MMHG | WEIGHT: 197.98 LBS | OXYGEN SATURATION: 99 % | HEART RATE: 87 BPM | TEMPERATURE: 97 F | RESPIRATION RATE: 18 BRPM | SYSTOLIC BLOOD PRESSURE: 135 MMHG

## 2024-09-25 DIAGNOSIS — S61.251A OPEN BITE OF LEFT INDEX FINGER WITHOUT DAMAGE TO NAIL, INITIAL ENCOUNTER: ICD-10-CM

## 2024-09-25 DIAGNOSIS — Z23 ENCOUNTER FOR IMMUNIZATION: ICD-10-CM

## 2024-09-25 DIAGNOSIS — Z00.00 ENCOUNTER FOR GENERAL ADULT MEDICAL EXAMINATION WITHOUT ABNORMAL FINDINGS: ICD-10-CM

## 2024-09-25 DIAGNOSIS — D49.0 NEOPLASM OF UNSPECIFIED BEHAVIOR OF DIGESTIVE SYSTEM: Chronic | ICD-10-CM

## 2024-09-25 DIAGNOSIS — K74.60 UNSPECIFIED CIRRHOSIS OF LIVER: ICD-10-CM

## 2024-09-25 DIAGNOSIS — F17.290 NICOTINE DEPENDENCE, OTHER TOBACCO PRODUCT, UNCOMPLICATED: ICD-10-CM

## 2024-09-25 DIAGNOSIS — I10 ESSENTIAL (PRIMARY) HYPERTENSION: ICD-10-CM

## 2024-09-25 DIAGNOSIS — E11.42 TYPE 2 DIABETES MELLITUS WITH DIABETIC POLYNEUROPATHY: ICD-10-CM
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
## 2024-09-25 DIAGNOSIS — M54.50 LOW BACK PAIN, UNSPECIFIED: ICD-10-CM

## 2024-09-25 DIAGNOSIS — E66.9 OBESITY, UNSPECIFIED: ICD-10-CM
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
## 2024-09-25 DIAGNOSIS — S61.452A OPEN BITE OF LEFT HAND, INITIAL ENCOUNTER: ICD-10-CM

## 2024-09-25 PROCEDURE — G2211 COMPLEX E/M VISIT ADD ON: CPT | Mod: NC

## 2024-09-25 PROCEDURE — 99214 OFFICE O/P EST MOD 30 MIN: CPT

## 2024-09-25 PROCEDURE — 90471 IMMUNIZATION ADMIN: CPT

## 2024-09-25 PROCEDURE — 73130 X-RAY EXAM OF HAND: CPT | Mod: LT

## 2024-09-25 PROCEDURE — 90715 TDAP VACCINE 7 YRS/> IM: CPT

## 2024-09-25 PROCEDURE — 73130 X-RAY EXAM OF HAND: CPT | Mod: 26,LT

## 2024-09-25 PROCEDURE — T1013: CPT

## 2024-09-25 PROCEDURE — 99283 EMERGENCY DEPT VISIT LOW MDM: CPT | Mod: 25

## 2024-09-25 PROCEDURE — 99284 EMERGENCY DEPT VISIT MOD MDM: CPT

## 2024-09-25 RX ORDER — TETANUS TOXOID, REDUCED DIPHTHERIA TOXOID AND ACELLULAR PERTUSSIS VACCINE, ADSORBED 5; 2.5; 8; 8; 2.5 [IU]/.5ML; [IU]/.5ML; UG/.5ML; UG/.5ML; UG/.5ML
0.5 SUSPENSION INTRAMUSCULAR ONCE
Refills: 0 | Status: COMPLETED | OUTPATIENT
Start: 2024-09-25 | End: 2024-09-25

## 2024-09-25 RX ORDER — RDII 250 MG CAPSULE
1
Qty: 7 | Refills: 0
Start: 2024-09-25 | End: 2024-10-01

## 2024-09-25 RX ORDER — AMOXICILLIN AND CLAVULANATE POTASSIUM 250; 125 MG/1; MG/1
1 TABLET, FILM COATED ORAL ONCE
Refills: 0 | Status: COMPLETED | OUTPATIENT
Start: 2024-09-25 | End: 2024-09-25

## 2024-09-25 RX ORDER — NICOTINE 4 MG/1
10 INHALANT RESPIRATORY (INHALATION)
Qty: 1 | Refills: 0 | Status: ACTIVE | COMMUNITY
Start: 2024-09-25 | End: 1900-01-01

## 2024-09-25 RX ORDER — AMOXICILLIN AND CLAVULANATE POTASSIUM 250; 125 MG/1; MG/1
1 TABLET, FILM COATED ORAL
Qty: 14 | Refills: 0
Start: 2024-09-25 | End: 2024-10-01

## 2024-09-25 RX ORDER — AMOXICILLIN AND CLAVULANATE POTASSIUM 875; 125 MG/1; MG/1
875-125 TABLET, COATED ORAL
Qty: 20 | Refills: 0 | Status: DISCONTINUED | COMMUNITY
Start: 2024-09-25 | End: 2024-09-25

## 2024-09-25 RX ORDER — MUPIROCIN 20 MG/G
2 OINTMENT TOPICAL 3 TIMES DAILY
Qty: 1 | Refills: 0 | Status: DISCONTINUED | COMMUNITY
Start: 2024-09-25 | End: 2024-09-25

## 2024-09-25 RX ADMIN — AMOXICILLIN AND CLAVULANATE POTASSIUM 1 TABLET(S): 250; 125 TABLET, FILM COATED ORAL at 15:49

## 2024-09-25 RX ADMIN — TETANUS TOXOID, REDUCED DIPHTHERIA TOXOID AND ACELLULAR PERTUSSIS VACCINE, ADSORBED 0.5 MILLILITER(S): 5; 2.5; 8; 8; 2.5 SUSPENSION INTRAMUSCULAR at 15:49

## 2024-09-25 NOTE — ED PROVIDER NOTE - PATIENT PORTAL LINK FT
You can access the FollowMyHealth Patient Portal offered by [facility] by registering at the following website: [email or web address]. By joining [employer]’s FollowMyHealth portal, you will also be able to view your health information using other applications (apps) compatible with our system.

## 2024-09-25 NOTE — ED PROVIDER NOTE - CLINICAL SUMMARY MEDICAL DECISION MAKING FREE TEXT BOX
Dog bite to hand of his own dog, unintentional.  No evidence og tenosynovitis.  Passive ROM without pain.  No fracture.  Antibiotics, tetanus, wound care.  Close follow up.  Encouraged to return for any new or worsening. Dog bite to hand of his own dog, unintentional.  No evidence of tenosynovitis.  Passive ROM without pain.  No fracture.  Antibiotics, tetanus, wound care.  Close follow up.  Encouraged to return for any new or worsening.

## 2024-09-25 NOTE — ED ADULT TRIAGE NOTE - CHIEF COMPLAINT QUOTE
Pt c/o bit by dog last night on L hand. It was his dog, dog is vaccinated for rabies.  Tetanus unknown

## 2024-09-25 NOTE — ED PROVIDER NOTE - PHYSICAL EXAMINATION
CONST: Well appearing in NAD  EYES: PERRL, EOMI, Sclera and conjunctiva clear.   CARD: Normal S1 S2; Normal rate and rhythm  RESP: Equal BS B/L, No wheezes, rhonchi or rales. No distress  SKIN: Linear dog bite 1.5cm to proximal aspect of L 2nd digit into webspace. Well-approximated  NEURO: A&Ox3, No focal deficits. Strength 5/5 with no sensory deficits.

## 2024-09-25 NOTE — PLAN
Replied to her.  Awaiting her response.   [FreeTextEntry1] : #Dog bite  -patient is unsure if dog is vaccinated  deep puncture wound adjacent to pip at webspace. tender joint without overt expressible pus in light of promixity to bone and tendon, as well as history of uncontrolled dm2 with no a1c performed in the past 15 months, directing patient to the ER to obtain more sensitive hand imaging, hand team evaluation, and possible need for an IV antibiotic its very possible this may develop into osteitis, osteomyelitis, septic arthritis, or tenosynovitis additionally, though seemingly domesticated, the dog's vaccine status is unclear   FROM PRIOR RECORD #Ho  Liver Cirrhosis 2/2 Hep c  # Hx alcohol abuse # HCC - alcohol cessation reinforced  # Type II DM: uncontrolled  - HbA1c: 8.9%, is on jardiance and ozempic, repeat labs today, continue ozempic to 1.0 mg sq /weekly  - has also been taking metformin 750mg and tolerating  - Ophtho refused by patient  - Podiatry follow up: Last seen 2022  Low sugar, low carbohydrate diet  Exercise Counseled  Patient given opportunity to discuss frequency and target blood sugar levels  Patient educated on symptoms of hypo/hyperglycemic events  Counseled on: Yearly Ophthalmology and Podiatry Exam  Obesity;  Diet/Exercise Counseled  Patient was counseled on changing their diet to low fat, low carbohydrates and low cholesterol.  Patient was also counseled on increasing their activity to 45 minutes of exercise daily.   # HTN controlled  HTN;  DASH diet discussed and recommended  Exercise and weight loss counseled  Frequency and target at home BP readings discussed  Decrease caffeine intake  Treatment options and possible side effects discussed  Patient counseled on symptoms of hypo/hypertension  Counseled: Yearly Ophthalmology exams  #Nicotine dependence  -quit smoking now vaping  -advised stop vaping and star nrt with nicotrol inhaler  # Obesity  Obesity;  Diet/Exercise Counseled  Patient was counseled on changing their diet to low fat, low carbohydrates and low cholesterol.  Patient was also counseled on increasing their activity to 45 minutes of exercise daily.   # Dyslipidemia:  - T Total Chol: 217 HDL: 173 LDL: 123 from labs 2 years prior   - continue atorvastatin 40mg QHS, repeat lipid panel     # HCM:  - Repeat Labs  - NLCST Low Dose CT Chest done x 2, WNL both times  - Colonoscopy:  multiple polyps removed repeat   - EGD in 2021, no need for repeat per last hepatology  - The patient has Cirrhosis, took his Pneumovax Aug 2020  - RTC in 3 months.  sign language interpretation services used at the bedside .

## 2024-09-25 NOTE — ED PROVIDER NOTE - NSFOLLOWUPINSTRUCTIONS_ED_ALL_ED_FT
Animal Bite, Adult  Animal bites range from mild to serious. An animal bite can result in any of these injuries:  A scratch.  A deep, open cut.  Broken (punctured) or torn skin.  A crush injury.  A bone injury.  A small bite from a house pet is usually less serious than a bite from a stray or wild animal. Cat bites can be more serious because their long, thin teeth can cause deep puncture wounds that close fast, trapping bacteria inside.    Stray or wild animals, such as a raccoon, alatorre, skunk, or bat, are at higher risk of carrying a serious infection called rabies, which they can pass to a human through a bite. A bite from one of these animals needs medical care right away and, sometimes, rabies vaccination.    What increases the risk?  You are more likely to be bitten by an animal if:  You are around unfamiliar pets.  You disturb an animal when it is eating, sleeping, or caring for its babies.  You are outdoors in a place where small, wild animals roam freely.  What are the signs or symptoms?  Common symptoms of an animal bite include:  Pain.  Bleeding.  Swelling.  Bruising.  How is this diagnosed?  This condition may be diagnosed based on a physical exam and medical history. Your health care provider will examine your wound and ask for details about the animal and how the bite happened. You may also have tests, such as:  Blood tests to check for infection.  X-rays to check for damage to bones or joints.  Taking a fluid sample from your wound and checking it for infection (culture test).  How is this treated?  Treatment depends on the type of animal, where the bite is on your body, and your medical history. Treatment may include:  Wound care. This often includes cleaning the wound and rinsing it out (flushing it) with saline solution, which is made of salt and water. A bandage (dressing) is also often applied. In rare cases, the wound may be closed with stitches (sutures), staples, skin glue, or adhesive strips.  Antibiotic medicine to prevent or treat infection. This medicine may be prescribed in pill or ointment form. If the bite area gets infected, the medicine may be given through an IV.  A tetanus shot to prevent tetanus infection.  Rabies treatment to prevent rabies infection, if the animal could have rabies.  Surgery. This may be done if a bite gets infected or causes damage that needs to be repaired.  Follow these instructions at home:  Medicines    Take or apply over-the-counter and prescription medicines only as told by your health care provider.  If you were prescribed an antibiotic medicine, take or apply it as told by your health care provider. Do not stop using the antibiotic even if you start to feel better.  Wound care    Two stitched wounds. One is normal. The other is red with pus and infected.  Follow instructions from your health care provider about how to take care of your wound. Make sure you:  Wash your hands with soap and water for at least 20 seconds before and after you change your dressing. If soap and water are not available, use hand .  Change your dressing as told by your health care provider.  Leave sutures, skin glue, or adhesive strips in place. These skin closures may need to stay in place for 2 weeks or longer. If adhesive strip edges start to loosen and curl up, you may trim the loose edges. Do not remove adhesive strips completely unless your health care provider tells you to do that.  Check your wound every day for signs of infection. Check for:  More redness, swelling, or pain.  More fluid or blood.  Warmth.  Pus or a bad smell.  General instructions    Bag of ice on a towel on the skin.   Raise (elevate) the injured area above the level of your heart while you are sitting or lying down, if this is possible.  If directed, put ice on the injured area. To do this:  Put ice in a plastic bag.  Place a towel between your skin and the bag.  Leave the ice on for 20 minutes, 2–3 times per day.  Remove the ice if your skin turns bright red. This is very important. If you cannot feel pain, heat, or cold, you have a greater risk of damage to the area.  Keep all follow-up visits. This is important.  Contact a health care provider if:  You have more redness, swelling, or pain around your wound.  Your wound feels warm to the touch.  You have a fever or chills.  You have a general feeling of sickness (malaise).  You feel nauseous or you vomit.  You have pain that does not get better.  Get help right away if:  You have a red streak that leads away from your wound.  You have non-clear fluid or more blood coming from your wound.  There is pus or a bad smell coming from your wound.  You have trouble moving your injured area.  You have numbness or tingling that spreads beyond your wound.  Summary  Animal bites can range from mild to serious. An animal bite can cause a scratch on the skin, a deep and open cut, torn or punctured skin, a crush injury, or a bone injury.  A bite from a stray or wild animal needs medical care right away and, sometimes, rabies vaccination.  Your health care provider will examine your wound and ask for details about the animal and how the bite happened.  Treatment may include wound care, antibiotic medicine, a tetanus shot, and rabies treatment if the animal could have rabies.  This information is not intended to replace advice given to you by your health care provider. Make sure you discuss any questions you have with your health care provider.

## 2024-09-25 NOTE — END OF VISIT
[] : Resident [FreeTextEntry3] : I saw the patient, examined the patient independently, reviewed medical record, and provided the medical services. Agree with resident note as personally edited above. ED eval of hand dog bite though not purulent on exam, puncture wound proximity to deeper structures, and joint space tenderness are concerning for more alarming pathology. would benefit from hand team evaluation + hand imaging, and possible subsequent need for intravenous antibiotic. I am also not quite sure how well diabetes control has been as of late, as labwork is from over a year ago

## 2024-09-25 NOTE — ED PROVIDER NOTE - OBJECTIVE STATEMENT
58yo deaf male presents for dog bite to L hand by his own dog. Pt reported was using a leaf blower when dog became agitated and wanted to play with blower and inadvertently bit L webspace between 2nd/3rd digits. Pt was seen by his PMD PTA and sent to ED for further evaluation. Pt cleansed wound, denies active bleeding. Denies pain to joint or decreased ROM. Denies any specific aggravating or relieving factors 60yo deaf male presents for dog bite to L hand by his own dog last evening. Pt reported was using a leaf blower when dog became agitated and wanted to play with blower and inadvertently bit L webspace between 2nd/3rd digits. Pt was seen by his PMD PTA and sent to ED for further evaluation. Pt cleansed wound, denies active bleeding. Denies pain to joint or decreased ROM. Denies any specific aggravating or relieving factors. Pt unsure when his last TD was.

## 2024-09-25 NOTE — PHYSICAL EXAM
[No Acute Distress] : no acute distress [No JVD] : no jugular venous distention [No Respiratory Distress] : no respiratory distress  [No Accessory Muscle Use] : no accessory muscle use [Normal Rate] : normal rate  [Regular Rhythm] : with a regular rhythm [de-identified] : L hand laceration beterrn 2nd and 3rd digit and multiple abrasions over the 3rd and fourth digits. no pus.

## 2024-09-25 NOTE — HISTORY OF PRESENT ILLNESS
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 Pt accompanied by  adherent w/ meds. Still drinking occasionally however alot less than before on occasion still vaping. Was walking dog yesterday and dog bit him. He is caring for his sons dog, but does not know vaccine status. Cleaned wound after. Now it hurts when he bends his hand and it doesnt stop bleeding

## 2024-09-25 NOTE — ED ADULT TRIAGE NOTE - ARRIVAL FROM
Problem: SLP ADULT - SWALLOWING, IMPAIRED  Goal: Advance to least restrictive diet without signs or symptoms of aspiration for planned discharge setting  See evaluation for individualized goals         Outcome: Adequate for Discharge Home

## 2024-09-25 NOTE — INTERPRETER SERVICES
[Time Spent: ____ minutes] : Total time spent using  services: [unfilled] minutes. The patient's primary language is not English thus required  services. [Interpreters_FullName] : gregory [TWNoteComboBox1] : American Sign Language

## 2024-10-03 DIAGNOSIS — I10 ESSENTIAL (PRIMARY) HYPERTENSION: ICD-10-CM

## 2024-10-03 DIAGNOSIS — E11.42 TYPE 2 DIABETES MELLITUS WITH DIABETIC POLYNEUROPATHY: ICD-10-CM

## 2024-10-03 DIAGNOSIS — E66.9 OBESITY, UNSPECIFIED: ICD-10-CM

## 2024-10-03 DIAGNOSIS — M54.50 LOW BACK PAIN, UNSPECIFIED: ICD-10-CM

## 2024-10-03 DIAGNOSIS — F17.290 NICOTINE DEPENDENCE, OTHER TOBACCO PRODUCT, UNCOMPLICATED: ICD-10-CM
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
## 2024-10-03 DIAGNOSIS — K74.60 UNSPECIFIED CIRRHOSIS OF LIVER: ICD-10-CM

## 2024-10-09 ENCOUNTER — APPOINTMENT (OUTPATIENT)
Dept: INTERNAL MEDICINE | Facility: CLINIC | Age: 59
End: 2024-10-09

## 2024-10-09 ENCOUNTER — OUTPATIENT (OUTPATIENT)
Dept: OUTPATIENT SERVICES | Facility: HOSPITAL | Age: 59
LOS: 1 days | End: 2024-10-09

## 2024-10-09 ENCOUNTER — INPATIENT (INPATIENT)
Facility: HOSPITAL | Age: 59
LOS: 1 days | Discharge: HOME | DRG: 956 | End: 2024-10-11
Attending: INTERNAL MEDICINE | Admitting: STUDENT IN AN ORGANIZED HEALTH CARE EDUCATION/TRAINING PROGRAM
Payer: MEDICAID

## 2024-10-09 VITALS
RESPIRATION RATE: 18 BRPM | OXYGEN SATURATION: 98 % | DIASTOLIC BLOOD PRESSURE: 72 MMHG | TEMPERATURE: 98 F | WEIGHT: 203.05 LBS | HEART RATE: 102 BPM | HEIGHT: 69 IN | SYSTOLIC BLOOD PRESSURE: 106 MMHG

## 2024-10-09 VITALS
HEIGHT: 69 IN | DIASTOLIC BLOOD PRESSURE: 79 MMHG | BODY MASS INDEX: 29.77 KG/M2 | OXYGEN SATURATION: 99 % | WEIGHT: 201 LBS | TEMPERATURE: 97.4 F | HEART RATE: 78 BPM | SYSTOLIC BLOOD PRESSURE: 123 MMHG

## 2024-10-09 DIAGNOSIS — F17.290 NICOTINE DEPENDENCE, OTHER TOBACCO PRODUCT, UNCOMPLICATED: ICD-10-CM

## 2024-10-09 DIAGNOSIS — K74.60 UNSPECIFIED CIRRHOSIS OF LIVER: ICD-10-CM

## 2024-10-09 DIAGNOSIS — Z23 ENCOUNTER FOR IMMUNIZATION: ICD-10-CM
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
## 2024-10-09 DIAGNOSIS — F10.90 ALCOHOL USE, UNSPECIFIED, UNCOMPLICATED: ICD-10-CM

## 2024-10-09 DIAGNOSIS — I10 ESSENTIAL (PRIMARY) HYPERTENSION: ICD-10-CM

## 2024-10-09 DIAGNOSIS — E11.42 TYPE 2 DIABETES MELLITUS WITH DIABETIC POLYNEUROPATHY: ICD-10-CM

## 2024-10-09 DIAGNOSIS — Z00.00 ENCOUNTER FOR GENERAL ADULT MEDICAL EXAMINATION WITHOUT ABNORMAL FINDINGS: ICD-10-CM

## 2024-10-09 DIAGNOSIS — D49.0 NEOPLASM OF UNSPECIFIED BEHAVIOR OF DIGESTIVE SYSTEM: Chronic | ICD-10-CM

## 2024-10-09 DIAGNOSIS — J30.9 ALLERGIC RHINITIS, UNSPECIFIED: ICD-10-CM

## 2024-10-09 DIAGNOSIS — I85.10 UNSPECIFIED CIRRHOSIS OF LIVER: ICD-10-CM

## 2024-10-09 LAB
ALBUMIN SERPL ELPH-MCNC: 3.7 G/DL — SIGNIFICANT CHANGE UP (ref 3.5–5.2)
ALP SERPL-CCNC: 175 U/L — HIGH (ref 30–115)
ALT FLD-CCNC: 54 U/L — HIGH (ref 0–41)
ANION GAP SERPL CALC-SCNC: 11 MMOL/L — SIGNIFICANT CHANGE UP (ref 7–14)
AST SERPL-CCNC: 54 U/L — HIGH (ref 0–41)
BASOPHILS # BLD AUTO: 0.04 K/UL — SIGNIFICANT CHANGE UP (ref 0–0.2)
BASOPHILS NFR BLD AUTO: 0.9 % — SIGNIFICANT CHANGE UP (ref 0–1)
BILIRUB SERPL-MCNC: 1.3 MG/DL — HIGH (ref 0.2–1.2)
BUN SERPL-MCNC: 14 MG/DL — SIGNIFICANT CHANGE UP (ref 10–20)
CALCIUM SERPL-MCNC: 9.1 MG/DL — SIGNIFICANT CHANGE UP (ref 8.4–10.4)
CHLORIDE SERPL-SCNC: 105 MMOL/L — SIGNIFICANT CHANGE UP (ref 98–110)
CO2 SERPL-SCNC: 24 MMOL/L — SIGNIFICANT CHANGE UP (ref 17–32)
CREAT SERPL-MCNC: 0.7 MG/DL — SIGNIFICANT CHANGE UP (ref 0.7–1.5)
EGFR: 107 ML/MIN/1.73M2 — SIGNIFICANT CHANGE UP
EOSINOPHIL # BLD AUTO: 0.04 K/UL — SIGNIFICANT CHANGE UP (ref 0–0.7)
EOSINOPHIL NFR BLD AUTO: 0.9 % — SIGNIFICANT CHANGE UP (ref 0–8)
GLUCOSE SERPL-MCNC: 195 MG/DL — HIGH (ref 70–99)
HCT VFR BLD CALC: 40.7 % — LOW (ref 42–52)
HGB BLD-MCNC: 14.7 G/DL — SIGNIFICANT CHANGE UP (ref 14–18)
IMM GRANULOCYTES NFR BLD AUTO: 0.2 % — SIGNIFICANT CHANGE UP (ref 0.1–0.3)
LACTATE SERPL-SCNC: 2 MMOL/L — SIGNIFICANT CHANGE UP (ref 0.7–2)
LYMPHOCYTES # BLD AUTO: 0.95 K/UL — LOW (ref 1.2–3.4)
LYMPHOCYTES # BLD AUTO: 20.4 % — LOW (ref 20.5–51.1)
MCHC RBC-ENTMCNC: 33.9 PG — HIGH (ref 27–31)
MCHC RBC-ENTMCNC: 36.1 G/DL — SIGNIFICANT CHANGE UP (ref 32–37)
MCV RBC AUTO: 93.8 FL — SIGNIFICANT CHANGE UP (ref 80–94)
MONOCYTES # BLD AUTO: 0.45 K/UL — SIGNIFICANT CHANGE UP (ref 0.1–0.6)
MONOCYTES NFR BLD AUTO: 9.7 % — HIGH (ref 1.7–9.3)
NEUTROPHILS # BLD AUTO: 3.17 K/UL — SIGNIFICANT CHANGE UP (ref 1.4–6.5)
NEUTROPHILS NFR BLD AUTO: 67.9 % — SIGNIFICANT CHANGE UP (ref 42.2–75.2)
NRBC # BLD: 0 /100 WBCS — SIGNIFICANT CHANGE UP (ref 0–0)
PLATELET # BLD AUTO: 93 K/UL — LOW (ref 130–400)
PMV BLD: 10.5 FL — HIGH (ref 7.4–10.4)
POTASSIUM SERPL-MCNC: 4.4 MMOL/L — SIGNIFICANT CHANGE UP (ref 3.5–5)
POTASSIUM SERPL-SCNC: 4.4 MMOL/L — SIGNIFICANT CHANGE UP (ref 3.5–5)
PROT SERPL-MCNC: 6.7 G/DL — SIGNIFICANT CHANGE UP (ref 6–8)
RBC # BLD: 4.34 M/UL — LOW (ref 4.7–6.1)
RBC # FLD: 13.4 % — SIGNIFICANT CHANGE UP (ref 11.5–14.5)
SODIUM SERPL-SCNC: 140 MMOL/L — SIGNIFICANT CHANGE UP (ref 135–146)
WBC # BLD: 4.66 K/UL — LOW (ref 4.8–10.8)
WBC # FLD AUTO: 4.66 K/UL — LOW (ref 4.8–10.8)

## 2024-10-09 PROCEDURE — 80053 COMPREHEN METABOLIC PANEL: CPT

## 2024-10-09 PROCEDURE — 85027 COMPLETE CBC AUTOMATED: CPT

## 2024-10-09 PROCEDURE — 99214 OFFICE O/P EST MOD 30 MIN: CPT

## 2024-10-09 PROCEDURE — A9579: CPT

## 2024-10-09 PROCEDURE — 99285 EMERGENCY DEPT VISIT HI MDM: CPT

## 2024-10-09 PROCEDURE — 85652 RBC SED RATE AUTOMATED: CPT

## 2024-10-09 PROCEDURE — 82962 GLUCOSE BLOOD TEST: CPT

## 2024-10-09 PROCEDURE — 73219 MRI UPPER EXTREMITY W/DYE: CPT | Mod: MC,LT

## 2024-10-09 PROCEDURE — 83036 HEMOGLOBIN GLYCOSYLATED A1C: CPT

## 2024-10-09 PROCEDURE — 36415 COLL VENOUS BLD VENIPUNCTURE: CPT

## 2024-10-09 PROCEDURE — 86140 C-REACTIVE PROTEIN: CPT

## 2024-10-09 PROCEDURE — 85025 COMPLETE CBC W/AUTO DIFF WBC: CPT

## 2024-10-09 PROCEDURE — 83735 ASSAY OF MAGNESIUM: CPT

## 2024-10-09 PROCEDURE — 73130 X-RAY EXAM OF HAND: CPT | Mod: 26,LT

## 2024-10-09 RX ORDER — CEFTRIAXONE SODIUM 1 G
1000 VIAL (EA) INJECTION ONCE
Refills: 0 | Status: COMPLETED | OUTPATIENT
Start: 2024-10-09 | End: 2024-10-09

## 2024-10-09 RX ORDER — FLUTICASONE PROPIONATE 50 UG/1
50 SPRAY, METERED NASAL TWICE DAILY
Qty: 1 | Refills: 2 | Status: ACTIVE | COMMUNITY
Start: 2024-10-09 | End: 1900-01-01

## 2024-10-09 RX ADMIN — Medication 100 MILLIGRAM(S): at 17:29

## 2024-10-09 RX ADMIN — Medication 100 MILLIGRAM(S): at 17:34

## 2024-10-09 NOTE — ED PROVIDER NOTE - WET READ LAUNCH FT
[FreeTextEntry1] : HEENT- ears- nl TMs and EACs\par Halpike - some mild vertigo left ear down, no nystagmus\par \par Neuro - alert and oriented x3\par attn conc lang nl\par Cn - mild anisocoria with left pupil 1mm larger than right - hearing grossly wnl. EOMi , face sym , face sens wnl\par Motor no drift or tremor\par sens wnl\par CSG wnl\par 
There are no Wet Read(s) to document.

## 2024-10-09 NOTE — ED PROVIDER NOTE - OBJECTIVE STATEMENT
59 year old M with hx of deaf, htn, dm, hcc, hep c presenting to ed with infected dog bite. Pt was seen in ed 09/25 after he was bit to L hand by his sons dog. Pt was seen in ed received tdap and finished course of Augmentin. Pt went to pmd today for follow up of wound and was sent to ed for further eval. He denies any assoc fever/chills, nausea, vomiting, paresthesias, decreased sensation, decreased rom.

## 2024-10-09 NOTE — ED ADULT NURSE NOTE - EXTENSIONS OF SELF_ADULT
[FreeTextEntry1] : 69 year old male with erectile dysfunction after RALP.\par \par Patient reports initially had good erectile function with viagra 100 mg\par Subsequently has had several episodes without being able to maintain erections sufficient for SI.\par Of note is extensive bicycle riding. There does not seem to be any temporal relationship to his riding and change infunction.\par TYRONE 5\par Reviewed the concepts of cavernous nerve recovery and penile rehabilitation with the patient, including the rationale for a daily low-dose PDE5i which should be taken even if it does not help produce an erection, in addition to the need to achieve actual erections on a regular basis (regardless of whether they will be used for intercourse).  Discussed the fact that patients who do not respond to PDE5i alone usually supplement the daily pill with intracavernosal injections 1-3 x per week.  \par Discussed EVD as adjunct or alternative. \par Patient is not willing to proceed with IC\par Will institute daily sildenafil 20 mg\par Will start Viagra 100 mg BIW \par Warned re priapism risk and need for immediate intervention if erection lasts more than 2 hours.  Patient instructed to report to an emergency room and explicitly inform staff of his condition and need for treatment.\par \par \par 8.13.2020\par patient returns doing will on sildenafil 100 mg\par no side effects\par no medical contraindication to sildenafil\par satisfied with effectiveness\par \par he is s/p right inguinal hernia and now has a LEFT inguinal hernia.  followup scheduled with Dr Johnson.\par \par Last psa last year was undetectable.\par repeat PSA today\par \par \par 11.3.2021\par Patient returns for management of his erectile dysfunction.  He has no new medications.  He exercises vigorously.  He bikes for miles multiple times per week.  He continues to respond to sildenafil.  He utilizes it every other week with good results.  He is pleased.\par \par PSA was obtained by his PCP.  It measured 0.03.  Previously it had been measured as less than 0.01.  We discussed the potential significance of this.  I reviewed his pathology with him.  We discussed the positive margins.  He did not receive radiation therapy.  He was managed by Dr. Carlin Mckeon.  \par We discussed the need for continued surveillance and potential radiation.  He expressed understanding and will repeat a PSA today and in 3 months.\par 
None

## 2024-10-09 NOTE — ED PROVIDER NOTE - CHIEF COMPLAINT
The patient is a 59y Male complaining of wound check.
0 = understands/communicates without difficulty

## 2024-10-09 NOTE — ED ADULT TRIAGE NOTE - CHIEF COMPLAINT QUOTE
pt came in for evaluation of wound to L hand after a dog bit him on sept 24 and has not improved since after taking PO AB  for 10 days. . denies fevers or chills. pt is deaf and  Prosper is at bedside.

## 2024-10-09 NOTE — ED PROVIDER NOTE - CLINICAL SUMMARY MEDICAL DECISION MAKING FREE TEXT BOX
59-year-old sent in today for evaluation by his primary care physician for IV antibiotics and hand surgery evaluation.  Patient was admitted for further evaluation and care.

## 2024-10-09 NOTE — ED PROVIDER NOTE - PHYSICAL EXAMINATION
CONSTITUTIONAL: Well-appearing; well-nourished; in no apparent distress.   EYES: PERRL; EOM intact.   ENT: normal nose; no rhinorrhea; normal pharynx with no tonsillar hypertrophy.   NECK: Supple; non-tender; no cervical lymphadenopathy.   CARDIOVASCULAR: Normal S1, S2; no murmurs, rubs, or gallops. Equal radial pulses  RESPIRATORY: Normal chest excursion with respiration; breath sounds clear and equal bilaterally; no wheezes, rhonchi, or rales.  GI/: Normal bowel sounds; non-distended; non-tender; no palpable organomegaly.   MS: No evidence of trauma or deformity. Normal ROM in all four extremities; non-tender to palpation; distal pulses are normal.   SKIN: Wound to L 4th finger with mild surrounding erythema/warmth, No tracking noted. No discharge noted  NEURO/PSYCH: A & O x 4; grossly unremarkable. mood and manner are appropriate. Grooming and personal hygiene are appropriate. No apparent thoughts of harm to self or others.

## 2024-10-09 NOTE — ED ADULT TRIAGE NOTE - ESI TRIAGE ACUITY LEVEL, MLM
3 Consent (Temporal Branch)/Introductory Paragraph: The rationale for Mohs was explained to the patient and consent was obtained. The risks, benefits and alternatives to therapy were discussed in detail. Specifically, the risks of damage to the temporal branch of the facial nerve, infection, scarring, bleeding, prolonged wound healing, incomplete removal, allergy to anesthesia, and recurrence were addressed. Prior to the procedure, the treatment site was clearly identified and confirmed by the patient. All components of Universal Protocol/PAUSE Rule completed.

## 2024-10-09 NOTE — ED ADULT NURSE NOTE - BREATH SOUNDS, MLM
Discharge Summary  Hospitalist    Date of Admission:  9/25/2019  Date of Discharge:  10/2/2019  Discharging Provider: Negar Mustafa MD    Primary Care Physician   Hoa López  Primary Care Provider Phone Number: [phone]  Primary Care Provider Fax Number: [phone]    PRINCIPAL DIAGNOSIS  Dyspnea on exertion multifactorial, recent surgery, exacerbation of heart failure with preserved ejection fraction, pulm hypertension, tachycardia physical deconditioning.  Acute exacerbation of heart failure with preserved EF  Pulmonary hypertension with positive vasodilator study.  Severe aortic stenosis s/p TAVR, 29mm Medtronic Evolut  Complete Heart Block status post PPM  Acute hypoxia on exertion, competent of heart failure with preserved EF, pulmonary hypertension, interstitial lung disease, obstructive sleep apnea not compliant with CPAP  Possible acute bronchitis-treated  Reactive thrombocytosis.  Status post lactic acidosis likely from hypoxia and volume overload  Mild hypokalemia, hyponatremia, SHIRIN from aggressive diuresis, volume load- improved.  Acute constipation  Physical deconditioning from acute illness, chronic back pain    Past Medical History:   Diagnosis Date     Aortic stenosis      Chronic pain      DM (diabetes mellitus), type 2 (H)     on insulin     Hyperlipidemia      JUAN on CPAP      Pneumonia      Polymyositis (H)      Pulmonary fibrosis, unspecified (H)      Seasonal allergies        History of Present Illness   Beulah Jane is an 70 year old male who presented with sob    Hospital Course   Beulah aJne is a 70 year old  male with medical history of severe aortic stenosis, recent TAVR, complete heart block, recent pacemaker placement, heart failure with preserved ejection fraction, hypertension, hyperlipidemia, diabetes, dermatomyositis admitted 9/25/2019 for shortness of breath.     Dyspnea on exertion multifactorial, recent surgery, exacerbation of heart failure with  preserved ejection fraction, pulm hypertension, tachycardia physical deconditioning.  Acute exacerbation of heart failure with preserved EF  Pulmonary hypertension with positive vasodilator study.  Severe aortic stenosis s/p TAVR, 29mm Medtronic Evolut  Complete Heart Block status post PPM  Admitted 8/29 - 9/7 at [facility] for acute exacerbation of heart failure with preserved ejection fraction and NSTEMI.  Then had severe AS, underwent TAVR on 9/4. TAVR procedure complicated by complete heart block after valve deployment, otherwise uncomplicated; he did have a pre-existing RBBB. He was bridged to pacemaker (9/6) with a temporary pacing wire and remained 100% V-paced  after surgery. Dismissed home on Lasix and PRN metolazone.    Presented with worsening shortness of breath, leg swelling and weight gain.  On exam 1+ pitting pedal edema with bilateral decreased breath sounds or crackles.  WBC 13.9, hemoglobin 9.9,  potassium 3.3, creatinine 1.20.  proBNP 68906  Troponin 0.106 and has been flat.  EKG sinus rhythm, left axis deviation.  Nonspecific ST-T wave changes.  Echocardiogram left ventricle systolic function normal, estimated EF at 55 to 60%.  Right ventricle mild to moderately dilated, mildly decreased right ventricular systolic function.S/P #29 Medtronic Evolute Pro bioprosthetic TAVR; appears well seated with grossly normal function  Chest x-ray dual-lead pacemaker in the right hemithorax unchanged.  Scattered bibasilar opacities again seen. Patchy areas of grounds place opacities since previous exam have improved.  Right heart cath 9/27/2019 Baseline: Severe pulmonary HTN with mildly elevated PCWP.  Vasodilator study positive.     Was followed by cardiology during hospitalization, had undergone diuresis with intravenous Lasix, switched to oral torsemide [9/28], has been having low systolic blood pressure, decreased torsemide dosing to once daily (9/29).    Continue sildenafil 5 mg 3 times daily  [9/27]  PTA Brillanta switched to Plavix [9/28]   Continue PTA aspirin and atorvastatin.   Low-salt diet, 2 L fluid restriction daily.  Monitor daily weights.  Aggressive incentive spirometry.  Limb elevation, Ace wraps [patient non compliant to ACE despite education multiple times ]    Acute hypoxia on exertion, competent of heart failure with preserved EF, pulmonary hypertension, interstitial lung disease, obstructive sleep apnea not compliant with CPAP  Oxygen saturation dropped to 86- 87 % on ambulation, prescribed 1 L supplemental nasal oxygen while ambulation, wean off as tolerated.  Goal oxygen saturation 90%.     Possible acute bronchitis.  Patient having ongoing cough.  Afebrile.  WBC 13.2.  Procalcitonin 0.40.  Wife recently treated for pneumonia.  Treated with levofloxacin for total of 5 days [9/25 to 9/30].  Minimal improvement in cough.  Supportive care.     CAD s/p PCI:  s/p PCI p-mLAD and dLAD 7/30/19.  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 Currently stable on MTX.   Recent imaging reviewed.  CRP 22.8 ESR 24.  Held PTA methotrexate during hospitalization.  PTA albuterol inhaler as needed.    Pulmonology [9/30] recommend treat pulmonary hypertension per cardiology.  Follow-up as outpatient.  Aggressive incentive spirometry.     JUAN  Recommended home CPAP at home settings, has not been compliant with CPAP during hospitalization [does not like device from hospital], unfortunately family was not able to bring in home CPAP during hospital stay despite education multiple times   At time of discharge emphasize compliance with CPAP.     Dermatomyositis w/Shannon-1 Positivity:  Chronic pain.  Patient has been diagnosed with polymyositis clinically, not by muscle biopsy. He did have a lung biopsy that showed fibrosis of unknown etiology. Has ILD on CT and blood work showed a positive Shannon-1  antibody. PFTs showed worsening DLCO by 11% from 7/2018-1/2019.   Held PTA methotrexate 25 mg weekly during hospitalization.  Continue folic acid 1 mg daily  PTA Gabapentin 1200mg tid, decreaseD dose to 600 mg 3 times daily given kidney injury.  PTA PRN Oxycodone 7.5/325 mg tid needs to be de-escalated, tapered off as outpatient.     Reactive thrombocytosis.  Platelets improving from 699 > 504 >448     Status post lactic acidosis likely from hypoxia and volume overload.  Lactic acid 3.0, with intravenous diuresis trended down to 1.2     Diabetes mellitus hemoglobin A1c of 6.7.  PTA administers NPH insulin 2 to 4 units twice daily if blood sugars greater than 140.  During hospitalization was on sliding scale insulin, restart PTA regimen on discharge.     Hyperlipidemia  Triglycerides 97, LDL 58, HDL 19.  Discontinued PTA gemfibrozil as patient complaining of muscle aches and lipid panel at goal.  Continue PTA atorvastatin.     Hypothyroidism  TSH 0.46.  Continue PTA levothyroxine.     PAD  Further management/workup as outpatient     Back pain Status post back surgery 4/2019.  Underwent T11-2, L1-2, L2-3 Transforminal Lumbar Interbody Fusion (TLIF) And Smith Borges Osteotomies,and L3-4 SPO as well.  Has intermittent back pain.  Continue gabapentin, Percocet as above.  Wean off Percocet as outpatient.  Compress.  PRN Tylenol.     Mild hypokalemia, hyponatremia, SHIRIN from aggressive diuresis, volume load- improved.  Monitor BMP in 1 week.     Anemia of chronic disease.  Baseline hemoglobin between 8-10.    Per patient last colonoscopy when he was in [place] many years of back.    Hemoglobin levels at baseline, iron saturation index 14.  Started on ferrous gluconate supplements oral daily.  Monitor levels in 1 week, defer age-appropriate health maintenance including colonoscopy to all PCP.     Acute constipation  Patient currently on narcotics. Physical deconditioning and minimal mobility.  docusate, MiraLAX  prn.  Consider cutting back, tapering off narcotics.     Physical deconditioning from acute illness, chronic back pain.  Patient having assistance from wife, sons at home.  UA negative, TSH within normal limits.  PT, OT assessment -  Home with home care.    Social issues   Pt was stable for discharge yesterday, cardiology signed off 9/30.  Per pt report had fumigation at home and he did not have acess to CPAP and meds and hence monitored overnight. Concerned again today regarding discharge - discussed with son this evening over the telephone.    Negar Mustafa MD, MD    Pending Results   Unresulted Labs Ordered in the Past 30 Days of this Admission     No orders found from 8/26/2019 to 9/26/2019.             Physical Exam   Vitals:    09/29/19 0256 09/30/19 0617 10/02/19 0616   Weight: 67.2 kg (148 lb 3.2 oz) 67.9 kg (149 lb 11.2 oz) 68.4 kg (150 lb 14.4 oz)     Vital Signs with Ranges  Temp:  [97.8  F (36.6  C)-98.4  F (36.9  C)] 98.1  F (36.7  C)  Pulse:  [] 113  Heart Rate:  [] 118  Resp:  [16-20] 20  BP: ()/(55-69) 96/61  SpO2:  [86 %-98 %] 92 %  I/O last 3 completed shifts:  In: 480 [P.O.:480]  Out: 1400 [Urine:1400]  PHYSICAL EXAM      )Consultations This Hospital Stay   CARDIOLOGY IP CONSULT  SOCIAL WORK IP CONSULT  PHYSICAL THERAPY ADULT IP CONSULT  OCCUPATIONAL THERAPY ADULT IP CONSULT  CARE TRANSITION RN/SW IP CONSULT  CARDIAC REHAB IP CONSULT  PULMONARY IP CONSULT    Time Spent on this Encounter   I, Negar Mustafa MD, personally saw the patient today and spent greater than 30 minutes discharging this patient.  Discussed with patient, bedside RN.    Discharge Orders      Discharge Order: F/U with Cardiac  OLIVIER      Follow-Up with Cardiologist      CARDIAC REHAB REFERRAL      Home Care PT Referral for Hospital Discharge      Home Care OT Referral for Hospital Discharge      Home care nursing referral      MD face to face encounter    Documentation of Face to Face and Certification  for Home Health Services    I certify that patient: Beulah Jane is under my care and that I, or a nurse practitioner or physician's assistant working with me, had a face-to-face encounter that meets the physician face-to-face encounter requirements with this patient on: 10/2/2019.    This encounter with the patient was in whole, or in part, for the following medical condition, which is the primary reason for home health care: Dyspnea on exertion multifactorial, pulmonary hypertension.  Diastolic heart failure.  Interstitial lung disease..    I certify that, based on my findings, the following services are medically necessary home health services: Occupational Therapy and Physical Therapy.    My clinical findings support the need for the above services because: Occupational Therapy Services are needed to assess and treat cognitive ability and address ADL safety due to deconditioning, respiratory issues.and Physical Therapy Services are needed to assess and treat the following functional impairments: Deconditioning, dyspnea on exertion from pulmonary hypertension and from pulmonary hypertension and interstitial lung disease    Further, I certify that my clinical findings support that this patient is homebound (i.e. absences from home require considerable and taxing effort and are for medical reasons or Bapt[employer] services or infrequently or of short duration when for other reasons) because: Leaving home is medically contraindicated for the following reason(s): Dyspnea on exertion, deconditioning.    Based on the above findings. I certify that this patient is confined to the home and needs intermittent skilled nursing care, physical therapy and/or speech therapy.  The patient is under my care, and I have initiated the establishment of the plan of care.  This patient will be followed by a physician who will periodically review the plan of care.  Physician/Provider to provide follow up care: Hoa López  [place]    Attending hospital physician (the Medicare certified PEC[facility] provider): Negar Mustafa MD  Physician Signature: See electronic signature associated with these discharge orders.  Date: 10/2/2019     Reason for your hospital stay    You were admitted to the hospital with shortness of breath, multifactorial.  Underwent right heart cath, noted to have severe pulmonary hypertension and started on sildenafil.  Followed by cardiology, pulmonary during hospitalization.     Follow-up and recommended labs and tests     Follow up with primary care provider, Hoa López, within 7 days for hospital follow- up.  The following labs/tests are recommended: BMP and hemoglobin in 1 week.  Follow-up with cardiology in clinic per schedule.    Follow-up with pulmonary in clinic per schedule.  Age-appropriate health maintenance on PCP visit.     Discharge Instructions    Aggressive incentive spirometry.  Strict fluid restriction to 2000 mL/day.  Emphasize compliance to limb elevation, Ace wraps.  Emphasized and encouraged compliance to CPAP.    Will need to consider de-escalating doses of narcotics and tapering off.  Monitor daily blood pressure, daily weights and review on provider visit.     Activity    Your activity upon discharge: activity as tolerated and no driving until PCP visit.     When to contact your care team    Contact medical provider if you have any chest pain or shortness of breath or palpitation.     Oxygen Adult    [place] [facility] Order 1 liter(s) by nasal cannula with activity with use of portable tank. Expected treatment length is 2-4 weeks.Test on conserving device as applicable.    Patients who qualify for home O2 coverage under the CMS guidelines require ABG tests or O2 sat readings obtained closest to, but no earlier than 2 days prior to the discharge, as evidence of the need for home oxygen therapy. Testing must be performed while patient is in the chronic stable state. See notes for O2 sats.    I  certify that this patient, Beulah Jane has been under my care and that I, or a nurse practitioner or physician's assistant working with me, had a face-to-face encounter that meets the face-to-face encounter requirements with this patient on 10/2/2019. The patient, Beulah Jane was evaluated or treated in whole, or in part, for the following medical condition, which necessitates the use of the ordered oxygen. Treatment Diagnosis:   Pulmonary hypertension.  Interstitial lung disease.  Diastolic heart failure.      Attending Provider: Negar Mustafa MD  Physician signature: See electronic signature associated with these discharge orders  Date of Order: October 2, 2019     Diet    Follow this diet upon discharge: Orders Placed This Encounter      Fluid restriction 2000 ML FLUID      Combination Diet 1685-3050 Calories: Moderate Consistent CHO (4-6 CHO units/meal); Low Saturated Fat Na <2400mg Diet       Discharge Medications   Current Discharge Medication List      START taking these medications    Details   acetaminophen (TYLENOL) 325 MG tablet Take 2 tablets (650 mg) by mouth every 8 hours as needed for pain    Associated Diagnoses: Pulmonary hypertension (H)      clopidogrel (PLAVIX) 75 MG tablet Take 1 tablet (75 mg) by mouth daily  Qty: 30 tablet, Refills: 0    Comments: Future refills by PCP Dr. Hoa López with phone number [phone].  Associated Diagnoses: Pulmonary hypertension (H)      docusate sodium (COLACE) 100 MG capsule Take 1 capsule (100 mg) by mouth At Bedtime For constipation. Hold for loose stools  Qty: 30 capsule, Refills: 0    Comments: Future refills by PCP Dr. Hoa López with phone number [phone].  Associated Diagnoses: Iron deficiency anemia, unspecified iron deficiency anemia type      ferrous gluconate (FERGON) 324 (38 Fe) MG tablet Take 1 tablet (324 mg) by mouth daily (with breakfast)  Qty: 30 tablet, Refills: 0    Comments: Future refills by PCP Dr. Camara  SHYAM López with phone number [phone].  Associated Diagnoses: Iron deficiency anemia, unspecified iron deficiency anemia type      gabapentin (NEURONTIN) 300 MG capsule Take 2 capsules (600 mg) by mouth 3 times daily  Qty: 60 capsule, Refills: 0    Associated Diagnoses: Spinal stenosis of lumbar region with neurogenic claudication      sildenafil (REVATIO) 20 MG tablet Take 0.25 tablets (5 mg) by mouth 3 times daily Avoid taking with fatty meal.      HOLD for SBP < 90  Qty: 90 tablet, Refills: 0    Comments: Future refills by PCP Dr. Hoa López with phone number [phone].  Associated Diagnoses: Pulmonary hypertension (H)      torsemide (DEMADEX) 20 MG tablet Take 1 tablet (20 mg) by mouth daily  Qty: 30 tablet, Refills: 0    Comments: Future refills by PCP Dr. Hoa López with phone number [phone].  Associated Diagnoses: Pulmonary hypertension (H)         CONTINUE these medications which have NOT CHANGED    Details   albuterol (2.5 MG/3ML) 0.083% neb solution Take 1 vial by nebulization every 6 hours as needed for shortness of breath / dyspnea or wheezing      aspirin (ASA) 81 MG EC tablet Take 1 tablet (81 mg) by mouth daily Start tomorrow morning.  Qty: 90 tablet, Refills: 3    Associated Diagnoses: Coronary artery disease due to lipid rich plaque      atorvastatin (LIPITOR) 40 MG tablet Take 1 tablet (40 mg) by mouth daily  Qty: 90 tablet, Refills: 3    Associated Diagnoses: Coronary artery disease due to lipid rich plaque      benzocaine-menthol (CEPACOL) 15-3.6 MG lozenge Place 1 lozenge inside cheek every hour as needed for sore throat  Qty: 60 lozenge, Refills: 0    Associated Diagnoses: Cough      calcium carbonate (OS-DMITRIY 500 MG [place]) 500 MG tablet Take 1 tablet (500 mg) by mouth 2 times daily  Qty: 180 tablet, Refills: 3    Associated Diagnoses: Pre-operative examination; Spinal stenosis of lumbar region with neurogenic claudication      Cholecalciferol (VITAMIN D) 2000 units tablet Take  "2,000 Units by mouth daily  Qty: 100 tablet, Refills: 3    Associated Diagnoses: Pre-operative examination; Spinal stenosis of lumbar region with neurogenic claudication      diphenhydrAMINE (BENADRYL) 50 MG capsule Take 50 mg by mouth as needed for itching or allergies       folic acid (FOLVITE) 1 MG tablet Take 1 tablet (1 mg) by mouth every morning  Qty: 90 tablet, Refills: 3    Associated Diagnoses: Pulmonary fibrosis (H)      LANsoprazole (PREVACID) 30 MG DR capsule Take 30 mg by mouth every morning (before breakfast)      levothyroxine (SYNTHROID/LEVOTHROID) 50 MCG tablet Take 50 mcg by mouth every morning       methotrexate 2.5 MG tablet Take 10 tablets (25 mg) by mouth once a week Tuesday  Qty: 120 tablet, Refills: 3    Associated Diagnoses: Inflammatory arthritis      multivitamin w/minerals (THERA-VIT-M) tablet Take 1 tablet by mouth daily    Associated Diagnoses: Routine health maintenance      omega 3 1000 MG CAPS Take 2 capsules by mouth every morning       oxyCODONE-acetaminophen (PERCOCET) 7.5-325 MG per tablet Take 1 tablet by mouth every 6 hours as needed for severe pain (max 3 tablets daily) Dispense 09/23/19. OK to start  09/25/19  Qty: 90 tablet, Refills: 0    Associated Diagnoses: Chronic pain syndrome; Dermatomyositis (H)      albuterol (PROAIR HFA/PROVENTIL HFA/VENTOLIN HFA) 108 (90 BASE) MCG/ACT Inhaler Inhale 2 puffs into the lungs as needed for shortness of breath / dyspnea or wheezing       insulin NPH (HUMULIN N/NOVOLIN N VIAL) 100 UNIT/ML vial Inject 2-6 Units Subcutaneous 2 times daily Will usually give if blood glucose >140  Qty: 10 mL, Refills: 11    Associated Diagnoses: Type 2 diabetes mellitus with diabetic neuropathy, with long-term current use of insulin (H)      insulin syringe-needle U-100 (29G X 1/2\" 1 ML) 29G X 1/2\" 1 ML miscellaneous Inject 1 Syringe Subcutaneous 2 times daily  Qty: 200 each, Refills: 11    Associated Diagnoses: Type 2 diabetes mellitus with other neurologic " complication, with long-term current use of insulin (H)         STOP taking these medications       cetirizine (ZYRTEC) 10 MG tablet Comments:   Reason for Stopping:         furosemide (LASIX) 40 MG tablet Comments:   Reason for Stopping:         gabapentin (NEURONTIN) 600 MG tablet Comments:   Reason for Stopping:         gemfibrozil (LOPID) 600 MG tablet Comments:   Reason for Stopping:         ticagrelor (BRILINTA) 90 MG tablet Comments:   Reason for Stopping:             Allergies   No Known Allergies    Discharge Disposition   Discharged to home at home care.  [Will stay at hotel for the next couple of days as home under fumigation )  Condition at discharge: Stable    DATA  Most Recent 3 CBC's:  Recent Labs   Lab Test 10/01/19  0523 09/28/19  0533 09/27/19  0522 09/26/19  0539 09/25/19  1411 09/07/19  0731   WBC  --   --   --  10.1 13.9* 9.7   HGB 8.7*  --  9.5* 9.0* 9.9* 7.7*   MCV  --   --   --  75* 75* 78   PLT  --  448 504* 493* 699* 335      Most Recent 3 BMP's:  Recent Labs   Lab Test 10/01/19  0523 09/29/19  0526 09/28/19  0533    134 132*   POTASSIUM 4.3 4.2 3.7   CHLORIDE 100 98 98   CO2 29 31 30   BUN 20 17 17   CR 1.07 1.11 0.90   ANIONGAP 4 5 4   DMITRIY 8.4* 8.9 8.7   * 107* 116*     Most Recent 2 LFT's:  Recent Labs   Lab Test 09/25/19  1411 09/04/19  1109   AST 26 55*   ALT 20 35   ALKPHOS 144 142   BILITOTAL 0.8 0.4     Most Recent INR's and Anticoagulation Dosing History:  Anticoagulation Dose History     Recent Dosing and Labs Latest Ref Rng & Units 4/29/2019 4/30/2019 5/1/2019 5/2/2019 5/12/2019 7/5/2019 8/29/2019    Warfarin 2.5 mg - 2.5 mg - - - - - -    INR 0.86 - 1.14 1.27(H) 1.24(H) 1.15(H) 1.18(H) 1.21(H) 1.11 1.38(H)        Most Recent 3 Troponin's:  Recent Labs   Lab Test 09/26/19  0539 09/25/19 2029 09/25/19  1411   TROPI 0.107* 0.119* 0.106*     Most Recent Cholesterol Panel:  Recent Labs   Lab Test 09/26/19  0539   CHOL 96   LDL 58   HDL 19*   TRIG 97     Most Recent 6  Bacteria Isolates From Any Culture (See EPIC Reports for Culture Details):  Recent Labs   Lab Test 09/25/19  2028 05/15/19  1650 05/09/19  2317 05/09/19  2146 05/09/19 2048 04/30/19 2033   CULT No growth No growth  No growth <10,000 colonies/mL  urogenital marlee  Susceptibility testing not routinely done   No growth No growth No growth     Most Recent TSH, T4 and A1c Labs:  Recent Labs   Lab Test 09/26/19 0539 08/29/19 2053   TSH 0.46  --    A1C  --  6.7*     Results for orders placed or performed during the hospital encounter of 09/25/19   XR Chest 2 Views    Narrative    CHEST TWO VIEWS  9/25/2019 4:14 PM     HISTORY: 70-year-old patient with shortness of breath.       Impression    IMPRESSION: Since September 7, 2019, heart size is normal. Dual lead  pacemaker in the right hemithorax is unchanged. Scattered bibasilar  opacities again identified, similar to previous exam. Technique  interstitial pattern noted throughout both lungs. Patchy areas of  groundglass opacities previous exam have improved. No pleural effusion  or pneumothorax. Nevertheless, there are residual reticulointerstitial  patterns. Extensive spine hardware, unchanged.    MERCEDES CORRIGAN MD          Clear
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
## 2024-10-10 LAB
A1C WITH ESTIMATED AVERAGE GLUCOSE RESULT: 8.3 % — HIGH (ref 4–5.6)
ALBUMIN SERPL ELPH-MCNC: 3.5 G/DL — SIGNIFICANT CHANGE UP (ref 3.5–5.2)
ALP SERPL-CCNC: 184 U/L — HIGH (ref 30–115)
ALT FLD-CCNC: 46 U/L — HIGH (ref 0–41)
ANION GAP SERPL CALC-SCNC: 10 MMOL/L — SIGNIFICANT CHANGE UP (ref 7–14)
AST SERPL-CCNC: 40 U/L — SIGNIFICANT CHANGE UP (ref 0–41)
BASOPHILS # BLD AUTO: 0.02 K/UL — SIGNIFICANT CHANGE UP (ref 0–0.2)
BASOPHILS NFR BLD AUTO: 0.6 % — SIGNIFICANT CHANGE UP (ref 0–1)
BILIRUB SERPL-MCNC: 1.3 MG/DL — HIGH (ref 0.2–1.2)
BUN SERPL-MCNC: 16 MG/DL — SIGNIFICANT CHANGE UP (ref 10–20)
CALCIUM SERPL-MCNC: 8.8 MG/DL — SIGNIFICANT CHANGE UP (ref 8.4–10.4)
CHLORIDE SERPL-SCNC: 100 MMOL/L — SIGNIFICANT CHANGE UP (ref 98–110)
CO2 SERPL-SCNC: 23 MMOL/L — SIGNIFICANT CHANGE UP (ref 17–32)
CREAT SERPL-MCNC: 0.6 MG/DL — LOW (ref 0.7–1.5)
EGFR: 112 ML/MIN/1.73M2 — SIGNIFICANT CHANGE UP
EOSINOPHIL # BLD AUTO: 0.01 K/UL — SIGNIFICANT CHANGE UP (ref 0–0.7)
EOSINOPHIL NFR BLD AUTO: 0.3 % — SIGNIFICANT CHANGE UP (ref 0–8)
ESTIMATED AVERAGE GLUCOSE: 192 MG/DL — HIGH (ref 68–114)
GLUCOSE BLDC GLUCOMTR-MCNC: 195 MG/DL — HIGH (ref 70–99)
GLUCOSE BLDC GLUCOMTR-MCNC: 234 MG/DL — HIGH (ref 70–99)
GLUCOSE BLDC GLUCOMTR-MCNC: 312 MG/DL — HIGH (ref 70–99)
GLUCOSE BLDC GLUCOMTR-MCNC: 329 MG/DL — HIGH (ref 70–99)
GLUCOSE SERPL-MCNC: 321 MG/DL — HIGH (ref 70–99)
HCT VFR BLD CALC: 39.6 % — LOW (ref 42–52)
HGB BLD-MCNC: 14.2 G/DL — SIGNIFICANT CHANGE UP (ref 14–18)
IMM GRANULOCYTES NFR BLD AUTO: 0 % — LOW (ref 0.1–0.3)
LYMPHOCYTES # BLD AUTO: 0.81 K/UL — LOW (ref 1.2–3.4)
LYMPHOCYTES # BLD AUTO: 23.5 % — SIGNIFICANT CHANGE UP (ref 20.5–51.1)
MAGNESIUM SERPL-MCNC: 1.7 MG/DL — LOW (ref 1.8–2.4)
MCHC RBC-ENTMCNC: 33.8 PG — HIGH (ref 27–31)
MCHC RBC-ENTMCNC: 35.9 G/DL — SIGNIFICANT CHANGE UP (ref 32–37)
MCV RBC AUTO: 94.3 FL — HIGH (ref 80–94)
MONOCYTES # BLD AUTO: 0.33 K/UL — SIGNIFICANT CHANGE UP (ref 0.1–0.6)
MONOCYTES NFR BLD AUTO: 9.6 % — HIGH (ref 1.7–9.3)
NEUTROPHILS # BLD AUTO: 2.27 K/UL — SIGNIFICANT CHANGE UP (ref 1.4–6.5)
NEUTROPHILS NFR BLD AUTO: 66 % — SIGNIFICANT CHANGE UP (ref 42.2–75.2)
NRBC # BLD: 0 /100 WBCS — SIGNIFICANT CHANGE UP (ref 0–0)
PLATELET # BLD AUTO: 79 K/UL — LOW (ref 130–400)
PMV BLD: 10.4 FL — SIGNIFICANT CHANGE UP (ref 7.4–10.4)
POTASSIUM SERPL-MCNC: 3.9 MMOL/L — SIGNIFICANT CHANGE UP (ref 3.5–5)
POTASSIUM SERPL-SCNC: 3.9 MMOL/L — SIGNIFICANT CHANGE UP (ref 3.5–5)
PROT SERPL-MCNC: 6.3 G/DL — SIGNIFICANT CHANGE UP (ref 6–8)
RBC # BLD: 4.2 M/UL — LOW (ref 4.7–6.1)
RBC # FLD: 13.5 % — SIGNIFICANT CHANGE UP (ref 11.5–14.5)
SODIUM SERPL-SCNC: 133 MMOL/L — LOW (ref 135–146)
WBC # BLD: 3.44 K/UL — LOW (ref 4.8–10.8)
WBC # FLD AUTO: 3.44 K/UL — LOW (ref 4.8–10.8)

## 2024-10-10 PROCEDURE — 99222 1ST HOSP IP/OBS MODERATE 55: CPT

## 2024-10-10 RX ORDER — INSULIN LISPRO 100/ML
VIAL (ML) SUBCUTANEOUS
Refills: 0 | Status: DISCONTINUED | OUTPATIENT
Start: 2024-10-10 | End: 2024-10-11

## 2024-10-10 RX ORDER — ALCOHOL ANTISEPTIC PADS
25 PADS, MEDICATED (EA) TOPICAL ONCE
Refills: 0 | Status: DISCONTINUED | OUTPATIENT
Start: 2024-10-10 | End: 2024-10-11

## 2024-10-10 RX ORDER — SODIUM CHLORIDE IRRIG SOLUTION 0.9 %
1000 SOLUTION, IRRIGATION IRRIGATION
Refills: 0 | Status: DISCONTINUED | OUTPATIENT
Start: 2024-10-10 | End: 2024-10-11

## 2024-10-10 RX ORDER — INSULIN GLARGINE 300 U/ML
12 INJECTION, SOLUTION SUBCUTANEOUS AT BEDTIME
Refills: 0 | Status: DISCONTINUED | OUTPATIENT
Start: 2024-10-10 | End: 2024-10-11

## 2024-10-10 RX ORDER — ALCOHOL ANTISEPTIC PADS
12.5 PADS, MEDICATED (EA) TOPICAL ONCE
Refills: 0 | Status: DISCONTINUED | OUTPATIENT
Start: 2024-10-10 | End: 2024-10-11

## 2024-10-10 RX ORDER — AMPICILLIN, SULBACTAM 250; 125 MG/ML; MG/ML
3 INJECTION, POWDER, FOR SOLUTION INTRAMUSCULAR; INTRAVENOUS EVERY 6 HOURS
Refills: 0 | Status: DISCONTINUED | OUTPATIENT
Start: 2024-10-10 | End: 2024-10-10

## 2024-10-10 RX ORDER — ATORVASTATIN CALCIUM 10 MG/1
40 TABLET, FILM COATED ORAL AT BEDTIME
Refills: 0 | Status: DISCONTINUED | OUTPATIENT
Start: 2024-10-10 | End: 2024-10-11

## 2024-10-10 RX ORDER — GLUCAGON INJECTION, SOLUTION 0.5 MG/.1ML
1 INJECTION, SOLUTION SUBCUTANEOUS ONCE
Refills: 0 | Status: DISCONTINUED | OUTPATIENT
Start: 2024-10-10 | End: 2024-10-11

## 2024-10-10 RX ORDER — VANCOMYCIN HCL-SODIUM CHLORIDE IV SOLN 1.5 GM/250ML-0.9% 1.5-0.9/25 GM/ML-%
1000 SOLUTION INTRAVENOUS ONCE
Refills: 0 | Status: COMPLETED | OUTPATIENT
Start: 2024-10-10 | End: 2024-10-10

## 2024-10-10 RX ORDER — AMPICILLIN, SULBACTAM 250; 125 MG/ML; MG/ML
INJECTION, POWDER, FOR SOLUTION INTRAMUSCULAR; INTRAVENOUS
Refills: 0 | Status: DISCONTINUED | OUTPATIENT
Start: 2024-10-10 | End: 2024-10-11

## 2024-10-10 RX ORDER — ENOXAPARIN SODIUM 150 MG/ML
40 INJECTION SUBCUTANEOUS EVERY 24 HOURS
Refills: 0 | Status: DISCONTINUED | OUTPATIENT
Start: 2024-10-10 | End: 2024-10-11

## 2024-10-10 RX ORDER — AMPICILLIN, SULBACTAM 250; 125 MG/ML; MG/ML
3 INJECTION, POWDER, FOR SOLUTION INTRAMUSCULAR; INTRAVENOUS EVERY 6 HOURS
Refills: 0 | Status: DISCONTINUED | OUTPATIENT
Start: 2024-10-11 | End: 2024-10-11

## 2024-10-10 RX ORDER — MAGNESIUM SULFATE 500 MG/ML
2 VIAL (ML) INJECTION ONCE
Refills: 0 | Status: COMPLETED | OUTPATIENT
Start: 2024-10-10 | End: 2024-10-10

## 2024-10-10 RX ORDER — AMPICILLIN, SULBACTAM 250; 125 MG/ML; MG/ML
3 INJECTION, POWDER, FOR SOLUTION INTRAMUSCULAR; INTRAVENOUS ONCE
Refills: 0 | Status: COMPLETED | OUTPATIENT
Start: 2024-10-10 | End: 2024-10-10

## 2024-10-10 RX ORDER — AMPICILLIN, SULBACTAM 250; 125 MG/ML; MG/ML
INJECTION, POWDER, FOR SOLUTION INTRAMUSCULAR; INTRAVENOUS
Refills: 0 | Status: DISCONTINUED | OUTPATIENT
Start: 2024-10-10 | End: 2024-10-10

## 2024-10-10 RX ORDER — INSULIN LISPRO 100/ML
4 VIAL (ML) SUBCUTANEOUS
Refills: 0 | Status: DISCONTINUED | OUTPATIENT
Start: 2024-10-10 | End: 2024-10-11

## 2024-10-10 RX ORDER — ALCOHOL ANTISEPTIC PADS
15 PADS, MEDICATED (EA) TOPICAL ONCE
Refills: 0 | Status: DISCONTINUED | OUTPATIENT
Start: 2024-10-10 | End: 2024-10-11

## 2024-10-10 RX ORDER — AMPICILLIN, SULBACTAM 250; 125 MG/ML; MG/ML
3 INJECTION, POWDER, FOR SOLUTION INTRAMUSCULAR; INTRAVENOUS ONCE
Refills: 0 | Status: DISCONTINUED | OUTPATIENT
Start: 2024-10-10 | End: 2024-10-10

## 2024-10-10 RX ADMIN — Medication 4 UNIT(S): at 17:04

## 2024-10-10 RX ADMIN — INSULIN GLARGINE 12 UNIT(S): 300 INJECTION, SOLUTION SUBCUTANEOUS at 21:33

## 2024-10-10 RX ADMIN — Medication 4: at 11:41

## 2024-10-10 RX ADMIN — Medication 25 GRAM(S): at 12:02

## 2024-10-10 RX ADMIN — ATORVASTATIN CALCIUM 40 MILLIGRAM(S): 10 TABLET, FILM COATED ORAL at 21:30

## 2024-10-10 RX ADMIN — Medication 4: at 08:03

## 2024-10-10 RX ADMIN — AMPICILLIN, SULBACTAM 200 GRAM(S): 250; 125 INJECTION, POWDER, FOR SOLUTION INTRAMUSCULAR; INTRAVENOUS at 23:21

## 2024-10-10 RX ADMIN — VANCOMYCIN HCL-SODIUM CHLORIDE IV SOLN 1.5 GM/250ML-0.9% 250 MILLIGRAM(S): 1.5-0.9/25 SOLUTION at 06:02

## 2024-10-10 RX ADMIN — Medication 1: at 17:04

## 2024-10-10 RX ADMIN — Medication 2.5 MILLIGRAM(S): at 06:02

## 2024-10-10 RX ADMIN — AMPICILLIN, SULBACTAM 200 GRAM(S): 250; 125 INJECTION, POWDER, FOR SOLUTION INTRAMUSCULAR; INTRAVENOUS at 15:47

## 2024-10-10 NOTE — PHARMACOTHERAPY INTERVENTION NOTE - COMMENTS
Recommended ampicillin-sulbactam 3g IV q6h, dosed based on an eGFR of 112mL/min/1.73m2, as per ID consult recommendations.

## 2024-10-10 NOTE — CONSULT NOTE ADULT - ATTENDING COMMENTS
I personally seen examined the patient.  I personally seen and examined the patient I agree with the above plan.  I will follow the patient during this hospital stay.

## 2024-10-10 NOTE — CONSULT NOTE ADULT - ASSESSMENT
59M RHD and deaf, w/ hx of HCC, hep C, and dog bite on 09/25 that presents to ED with poorly controlled diabetes and worsening hand infx per primary care doctor. Upon evaluate wound appears free of infection and undergoing normal stages of healing. Some exposed subcutaneous fat in the 2nd webspace but no pus or expressible fluid. Xray of hand unremarkable.     Plan:  - continue abx per ID doctors  - recommend washing of hands with warm soap and water, okay to do 15min TID hand soaks as well  - spoke to patient about the importance of proper glycemic control for preventing infx and for healing  - dry dressing applied to protect wound sites  - will follow  - patient examined with Dr. Pranav Canela present for the entirety of consultation.       Plastic Surgery  [id number]# [facility] pager  [phone] [facility] pager  [facility] Green Team [id number]  Available on Teams

## 2024-10-10 NOTE — PROGRESS NOTE ADULT - SUBJECTIVE AND OBJECTIVE BOX
SUBJECTIVE/OVERNIGHT EVENTS  Today is hospital day 1d. This morning patient was seen and examined at bedside, resting comfortably in bed. No acute or major events overnight.    MEDICATIONS  STANDING MEDICATIONS  atorvastatin 40 milliGRAM(s) Oral at bedtime  dextrose 5%. 1000 milliLiter(s) IV Continuous <Continuous>  dextrose 5%. 1000 milliLiter(s) IV Continuous <Continuous>  dextrose 50% Injectable 25 Gram(s) IV Push once  dextrose 50% Injectable 25 Gram(s) IV Push once  dextrose 50% Injectable 12.5 Gram(s) IV Push once  enoxaparin Injectable 40 milliGRAM(s) SubCutaneous every 24 hours  glucagon  Injectable 1 milliGRAM(s) IntraMuscular once  insulin lispro (ADMELOG) corrective regimen sliding scale   SubCutaneous three times a day before meals  lisinopril 2.5 milliGRAM(s) Oral daily  propranolol 10 milliGRAM(s) Oral two times a day    PRN MEDICATIONS  dextrose Oral Gel 15 Gram(s) Oral once PRN    VITALS  T(F): 98 (10-10-24 @ 05:00), Max: 98.4 (10-10-24 @ 00:08)  HR: 77 (10-10-24 @ 05:00) (77 - 102)  BP: 106/64 (10-10-24 @ 05:00) (106/64 - 116/69)  RR: 18 (10-10-24 @ 05:00) (17 - 18)  SpO2: 98% (10-10-24 @ 02:29) (97% - 98%)  POCT Blood Glucose.: 329 mg/dL (10-10-24 @ 07:18)    PHYSICAL EXAM  GENERAL: No acute distress, well-developed  CHEST/LUNG: Clear to auscultation bilaterally; No wheeze, equal breath sounds bilaterally, respirations nonlabored  HEART: Regular rate and rhythm; No murmurs, rubs, or gallops  ABDOMEN: Soft, nontender, nondistended;   NEUROLOGY: AAOx3, non-focal, moves all extremities spontaneously  Extremities: Ulcer present on L hand, does not look inflamed    LABS             14.7   4.66  )-----------( 93       ( 10-09-24 @ 20:30 )             40.7     140  |  105  |  14  -------------------------<  195   10-09-24 @ 20:30  4.4  |  24  |  0.7    Ca      9.1     10-09-24 @ 20:30    TPro  6.7  /  Alb  3.7  /  TBili  1.3  /  DBili  x   /  AST  54  /  ALT  54  /  AlkPhos  175  /  GGT  x     10-09-24 @ 20:30        Urinalysis Basic - ( 09 Oct 2024 20:30 )    Color: x / Appearance: x / SG: x / pH: x  Gluc: 195 mg/dL / Ketone: x  / Bili: x / Urobili: x   Blood: x / Protein: x / Nitrite: x   Leuk Esterase: x / RBC: x / WBC x   Sq Epi: x / Non Sq Epi: x / Bacteria: x          IMAGING SUBJECTIVE/OVERNIGHT EVENTS  Today is hospital day 1d. This morning patient was seen and examined at bedside, resting comfortably in bed. No acute or major events overnight. Pt says the pain is mild.     MEDICATIONS  STANDING MEDICATIONS  atorvastatin 40 milliGRAM(s) Oral at bedtime  dextrose 5%. 1000 milliLiter(s) IV Continuous <Continuous>  dextrose 5%. 1000 milliLiter(s) IV Continuous <Continuous>  dextrose 50% Injectable 25 Gram(s) IV Push once  dextrose 50% Injectable 25 Gram(s) IV Push once  dextrose 50% Injectable 12.5 Gram(s) IV Push once  enoxaparin Injectable 40 milliGRAM(s) SubCutaneous every 24 hours  glucagon  Injectable 1 milliGRAM(s) IntraMuscular once  insulin lispro (ADMELOG) corrective regimen sliding scale   SubCutaneous three times a day before meals  lisinopril 2.5 milliGRAM(s) Oral daily  propranolol 10 milliGRAM(s) Oral two times a day    PRN MEDICATIONS  dextrose Oral Gel 15 Gram(s) Oral once PRN    VITALS  T(F): 98 (10-10-24 @ 05:00), Max: 98.4 (10-10-24 @ 00:08)  HR: 77 (10-10-24 @ 05:00) (77 - 102)  BP: 106/64 (10-10-24 @ 05:00) (106/64 - 116/69)  RR: 18 (10-10-24 @ 05:00) (17 - 18)  SpO2: 98% (10-10-24 @ 02:29) (97% - 98%)  POCT Blood Glucose.: 329 mg/dL (10-10-24 @ 07:18)    PHYSICAL EXAM  GENERAL: No acute distress, well-developed  CHEST/LUNG: Clear to auscultation bilaterally; No wheeze, equal breath sounds bilaterally, respirations nonlabored  HEART: Regular rate and rhythm; No murmurs, rubs, or gallops  ABDOMEN: Soft, nontender, nondistended;   NEUROLOGY: AAOx3, non-focal, moves all extremities spontaneously  Extremities: Ulcer present on L hand, does not look inflamed    LABS             14.7   4.66  )-----------( 93       ( 10-09-24 @ 20:30 )             40.7     140  |  105  |  14  -------------------------<  195   10-09-24 @ 20:30  4.4  |  24  |  0.7    Ca      9.1     10-09-24 @ 20:30    TPro  6.7  /  Alb  3.7  /  TBili  1.3  /  DBili  x   /  AST  54  /  ALT  54  /  AlkPhos  175  /  GGT  x     10-09-24 @ 20:30        Urinalysis Basic - ( 09 Oct 2024 20:30 )    Color: x / Appearance: x / SG: x / pH: x  Gluc: 195 mg/dL / Ketone: x  / Bili: x / Urobili: x   Blood: x / Protein: x / Nitrite: x   Leuk Esterase: x / RBC: x / WBC x   Sq Epi: x / Non Sq Epi: x / Bacteria: x          IMAGING

## 2024-10-10 NOTE — CONSULT NOTE ADULT - ASSESSMENT
ASSESSMENT  59 year old M with hx of deaf, htn, dm, hcc, hep c presented to ed with infected dog bite. Pt was seen in ed 09/25 after he was bitten on his L hand by his son's dog. Pt was seen in ed received tdap and finished course of Augmentin. Pt went to pmd today for follow up of wound and was sent to ed for further eval.    IMPRESSION  #  #   xray L hand Soft tissue swelling overlying the second digit, distal phalanx. No acute   fracture or radiopaque foreign body. No subcutaneous emphysema.    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 All final recommendations to follow after interview and examination of the patient. Please follow recommendations noted below.    If any questions, please send a message or call on [employer] Teams  Please continue to update ID with any pertinent new laboratory, radiographic findings, or change in clinical status   ASSESSMENT  59 year old M with hx of deaf, htn, dm, hcc, hep c presented to ed with infected dog bite. Pt was seen in ed 09/25 after he was bitten on his L hand by his son's dog. Pt was seen in ed received tdap and finished course of Augmentin. Pt went to pmd today for follow up of wound and was sent to ed for further eval.    IMPRESSION  #L hand dog bite  Received TDAP vaccine on 9/25  Completed coures of augmentin   xray L hand Soft tissue swelling overlying the second digit, distal phalanx. No acute   fracture or radiopaque foreign body. No subcutaneous emphysema.  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 Likely poor healing related to DM/Liver disease   - f/u MRI, surgery . D/C antibiotics if workup unrevealing  - ESR CRP     If any questions, please send a message or call on [employer] Teams  Please continue to update ID with any pertinent new laboratory, radiographic findings, or change in clinical status

## 2024-10-10 NOTE — PROGRESS NOTE ADULT - ASSESSMENT
#L hand ulcer  #Dog bite  - No SIRs, HD stable  - Not inflamed , no tenderness on palpation  - Received TDAP vaccine on 9/25  - C/o mild pain  - Completed course of augmentin  - sp ceftriaxone and flagyl in ED. Giving 1 dose vanc now  - fu ID consult  - Wound care cs    #Thrombocytopenia  - Likely in setting of Hep C    #Hep c complicated by HCC  #HLD  #HTN  -cw home meds    #DM  -On jardiance and ozempic at home  -ISS   -FS goal 140-180    Used interpret services for  ID: [id number]    #MISC    #DVT prophylaxis: Lovenox 40mg SubQ qHS  #GI prophylaxis: PPI  #Diet: DASH  #Activity: as gaye  #Code status: Full Code  #Disposition:      #PENDING #L hand ulcer  #Dog bite  - No SIRs, HD stable  - Not inflamed , no tenderness on palpation  - Received TDAP vaccine on 9/25  - C/o mild pain  - Completed course of augmentin  - sp ceftriaxone and flagyl in ED. Giving 1 dose vanc now  - fu ID consult  - f/u Wound care cs    #Thrombocytopenia  - Likely in setting of Hep C    #Hep c complicated by HCC  #HLD  #HTN  -cw home meds    #DM  -On jardiance and ozempic at home  -ISS   -FS goal 140-180    Used interpret services for  ID: [id number]    #MISC  #DVT prophylaxis: Lovenox 40mg SubQ qHS  #GI prophylaxis: PPI  #Diet: DASH  #Activity: as gaye  #Code status: Full Code  #Disposition:      #PENDING #L hand ulcer  #Dog bite  - No SIRs, HD stable  - Not inflamed , no tenderness on palpation  - Received TDAP vaccine on 9/25  - C/o mild pain  - Completed course of augmentin  - sp ceftriaxone and flagyl in ED. Giving 1 dose vanc now  - hand xray: soft tissue swelling, no sq emphysema   - f/u ID consult  - f/u hand MRI  - Unasyn per ID  - f/u hand surgery eval    #Thrombocytopenia  - Likely in setting of Hep C    #Hep c complicated by HCC  #HLD  #HTN  -cw home meds    #DM  -On jardiance and ozempic at home  -ISS   -FS goal 140-180    Used interpret services for      #MISC  #DVT prophylaxis: Lovenox 40mg SubQ qHS  #GI prophylaxis: PPI  #Diet: DASH  #Activity: as gaye  #Code status: Full Code  #Disposition: acute    #PENDING  - f/u ID consult  - f/u hand MRI  - Unasyn per ID  - f/u hand surgery eval

## 2024-10-10 NOTE — H&P ADULT - HISTORY OF PRESENT ILLNESS
59 year old M with hx of deaf, htn, dm, hcc, hep c presented to ed with infected dog bite. Pt was seen in ed 09/25 after he was bitten on his L hand by his sons dog. Pt was seen in ed received tdap and finished course of Augmentin. Pt went to pmd today for follow up of wound and was sent to ed for further eval. He denies any assoc fever/chills, nausea, vomiting, paresthesias, decreased sensation, decreased rom.  Vitals in ED were unremarkable  Labs significant for Plt  count 93(stable since last admission), AST/ALT/ ALP: 54/54/175, TBili 1.3

## 2024-10-10 NOTE — H&P ADULT - ATTENDING COMMENTS
59 year old M with hx of deaf, htn, dm, hcc, hep c presented to ed with infected dog bite. Pt was seen in ed 09/25 after he was bitten on his L hand by his sons dog. Pt was seen in ed received tdap and finished course of Augmentin.     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  which could results in poor clinical outcomes  Received TDAP vaccine on 9/25  Completed coures of augmentin  XR Hand Left 10/09 showing soft tissue swelling overlying the second digit, distal phalanx  - fu MR Hand Left (no metal in body per pt)  - IV unasyn  - Hand surgery eval  - fu ID    #Deaf  In-house  available (See sign next to patient to contact)    #Thrombocytopenia  -Likely in setting of Hep C    #Hep c complicated by HCC  #HLD  #HTN  -cw home meds    #DM  -On jardiance and ozempic at home  -ISS   -FS goal 140-180    DVT Prophylaxis:  Lovenox 40mg SubQ qHS    #Progress Note Handoff  Pending (specify): Cont IV abx, hand surgery, MRI Hand  Family discussion: colleen pt regarding plan of care  Disposition: GMF, from home

## 2024-10-10 NOTE — CONSULT NOTE ADULT - NS ATTEST RISK PROBLEM GEN_ALL_CORE FT
One acute complicated injury- dog bite,    - I discussed my recommendations with the primary team housestaff / Attending

## 2024-10-10 NOTE — CONSULT NOTE ADULT - SUBJECTIVE AND OBJECTIVE BOX
KRAIG DA SILVAOR  59y, Male  Allergy: No Known Allergies      CHIEF COMPLAINT:   Dog bite (10 Oct 2024 07:30)      LOS  1d    HPI  HPI:   59 year old M with hx of deaf, htn, dm, hcc, hep c presented to ed with infected dog bite. Pt was seen in ed 09/25 after he was bitten on his L hand by his son's dog. Pt was seen in ed received tdap and finished course of Augmentin. Pt went to pmd today for follow up of wound and was sent to ed for further eval. He denies any assoc fever/chills, nausea, vomiting, paresthesias, decreased sensation, decreased rom.  Vitals in ED were unremarkable  Labs significant for Plt  count 93(stable since last admission), AST/ALT/ ALP: 54/54/175, TBili 1.3 (10 Oct 2024 00:39)      INFECTIOUS DISEASE HISTORY:  ID consulted for dog bite  complete po augmentin  ABX given:   cefTRIAXone   IVPB   100 mL/Hr IV Intermittent (10-09-24 @ 17:29)  metroNIDAZOLE  IVPB   100 mL/Hr IV Intermittent (10-09-24 @ 17:34)  vancomycin  IVPB   250 mL/Hr IV Intermittent (10-10-24 @ 06:02)            Currently ordered for:      [facility]  PAST MEDICAL & SURGICAL HISTORY:  DM (diabetes mellitus)      Liver cancer      History of hepatitis C      Hepatocellular carcinoma      HTN (hypertension)      Encounter for ablation of neoplasm of liver          FAMILY HISTORY      SOCIAL HISTORY  Social History:        ROS  ***    VITALS:  T(F): 98, Max: 98.4 (10-10-24 @ 00:08)  HR: 77  BP: 106/64  RR: 18Vital Signs Last 24 Hrs  T(C): 36.7 (10 Oct 2024 05:00), Max: 36.9 (10 Oct 2024 00:08)  T(F): 98 (10 Oct 2024 05:00), Max: 98.4 (10 Oct 2024 00:08)  HR: 77 (10 Oct 2024 05:00) (77 - 102)  BP: 106/64 (10 Oct 2024 05:00) (106/64 - 116/69)  BP(mean): --  RR: 18 (10 Oct 2024 05:00) (17 - 18)  SpO2: 98% (10 Oct 2024 02:29) (97% - 98%)    Parameters below as of 10 Oct 2024 02:29  Patient On (Oxygen Delivery Method): room air        PHYSICAL EXAM:  ***    TESTS & MEASUREMENTS:                        14.2   3.44  )-----------( 79       ( 10 Oct 2024 07:28 )             39.6     10-09    140  |  105  |  14  ----------------------------<  195[H]  4.4   |  24  |  0.7    Ca    9.1      09 Oct 2024 20:30    TPro  6.7  /  Alb  3.7  /  TBili  1.3[H]  /  DBili  x   /  AST  54[H]  /  ALT  54[H]  /  AlkPhos  175[H]  10-09      LIVER FUNCTIONS - ( 09 Oct 2024 20:30 )  Alb: 3.7 g/dL / Pro: 6.7 g/dL / ALK PHOS: 175 U/L / ALT: 54 U/L / AST: 54 U/L / GGT: x           Urinalysis Basic - ( 09 Oct 2024 20:30 )    Color: x / Appearance: x / SG: x / pH: x  Gluc: 195 mg/dL / Ketone: x  / Bili: x / Urobili: x   Blood: x / Protein: x / Nitrite: x   Leuk Esterase: x / RBC: x / WBC x   Sq Epi: x / Non Sq Epi: x / Bacteria: x          Lactate, Blood: 2.0 mmol/L (10-09-24 @ 20:30)      INFECTIOUS DISEASES TESTING      INFLAMMATORY MARKERS      RADIOLOGY & ADDITIONAL TESTS:  I have personally reviewed the last Chest xray  CXR      CT      CARDIOLOGY TESTING       MEDICATIONS  atorvastatin 40 Oral at bedtime  dextrose 5%. 1000 IV Continuous <Continuous>  dextrose 5%. 1000 IV Continuous <Continuous>  dextrose 50% Injectable 25 IV Push once  dextrose 50% Injectable 25 IV Push once  dextrose 50% Injectable 12.5 IV Push once  enoxaparin Injectable 40 SubCutaneous every 24 hours  glucagon  Injectable 1 IntraMuscular once  insulin lispro (ADMELOG) corrective regimen sliding scale  SubCutaneous three times a day before meals  lisinopril 2.5 Oral daily  propranolol 10 Oral two times a day      ANTIBIOTICS:      ALLERGIES:  No Known Allergies         JUDI DA SILVA  59y, Male  Allergy: No Known Allergies      CHIEF COMPLAINT:   Dog bite (10 Oct 2024 07:30)      LOS  1d    HPI  HPI:   59 year old M with hx of deaf, htn, dm, hcc, hep c presented to ed with infected dog bite. Pt was seen in ed 09/25 after he was bitten on his L hand by his son's dog. Pt was seen in ed received tdap and finished course of Augmentin. Pt went to pmd today for follow up of wound and was sent to ed for further eval. He denies any assoc fever/chills, nausea, vomiting, paresthesias, decreased sensation, decreased rom.  Vitals in ED were unremarkable  Labs significant for Plt  count 93(stable since last admission), AST/ALT/ ALP: 54/54/175, TBili 1.3 (10 Oct 2024 00:39)      INFECTIOUS DISEASE HISTORY:  ID consulted for dog bite   [id number]    completed po augmentin, reports continued pain at site    ABX given:   cefTRIAXone   IVPB   100 mL/Hr IV Intermittent (10-09-24 @ 17:29)  metroNIDAZOLE  IVPB   100 mL/Hr IV Intermittent (10-09-24 @ 17:34)  vancomycin  IVPB   250 mL/Hr IV Intermittent (10-10-24 @ 06:02)            Currently ordered for: no antibiotics       PMH  PAST MEDICAL & SURGICAL HISTORY:  DM (diabetes mellitus)      Liver cancer      History of hepatitis C      Hepatocellular carcinoma      HTN (hypertension)      Encounter for ablation of neoplasm of liver          FAMILY HISTORY  non-contributory     SOCIAL HISTORY  Social History:        ROS  General: Denies rigors, nightsweats  HEENT: Denies headache, rhinorrhea, sore throat, eye pain  CV: Denies CP, palpitations  PULM: Denies wheezing, hemoptysis  GI: Denies hematemesis, hematochezia, melena  : Denies discharge, hematuria  MSK: as noted above   SKIN: Denies rash, lesions  NEURO: Denies paresthesias, weakness  PSYCH: Denies depression, anxiety     VITALS:  T(F): 98, Max: 98.4 (10-10-24 @ 00:08)  HR: 77  BP: 106/64  RR: 18Vital Signs Last 24 Hrs  T(C): 36.7 (10 Oct 2024 05:00), Max: 36.9 (10 Oct 2024 00:08)  T(F): 98 (10 Oct 2024 05:00), Max: 98.4 (10 Oct 2024 00:08)  HR: 77 (10 Oct 2024 05:00) (77 - 102)  BP: 106/64 (10 Oct 2024 05:00) (106/64 - 116/69)  BP(mean): --  RR: 18 (10 Oct 2024 05:00) (17 - 18)  SpO2: 98% (10 Oct 2024 02:29) (97% - 98%)    Parameters below as of 10 Oct 2024 02:29  Patient On (Oxygen Delivery Method): room air        PHYSICAL EXAM:  Gen: NAD, resting in bed  HEENT: Normocephalic, atraumatic  Neck: supple, no lymphadenopathy  CV: Regular rate & regular rhythm  Lungs: decreased BS at bases, no fremitus  Abdomen: Soft, BS present  Ext: Warm, well perfused  Neuro: non focal, awake  Skin: L hand dried eschar at base of index finger, no purulence, no induration, no cellulitis   Lines: no phlebitis     TESTS & MEASUREMENTS:                        14.2   3.44  )-----------( 79       ( 10 Oct 2024 07:28 )             39.6     10-09    140  |  105  |  14  ----------------------------<  195[H]  4.4   |  24  |  0.7    Ca    9.1      09 Oct 2024 20:30    TPro  6.7  /  Alb  3.7  /  TBili  1.3[H]  /  DBili  x   /  AST  54[H]  /  ALT  54[H]  /  AlkPhos  175[H]  10-09      LIVER FUNCTIONS - ( 09 Oct 2024 20:30 )  Alb: 3.7 g/dL / Pro: 6.7 g/dL / ALK PHOS: 175 U/L / ALT: 54 U/L / AST: 54 U/L / GGT: x           Urinalysis Basic - ( 09 Oct 2024 20:30 )    Color: x / Appearance: x / SG: x / pH: x  Gluc: 195 mg/dL / Ketone: x  / Bili: x / Urobili: x   Blood: x / Protein: x / Nitrite: x   Leuk Esterase: x / RBC: x / WBC x   Sq Epi: x / Non Sq Epi: x / Bacteria: x          Lactate, Blood: 2.0 mmol/L (10-09-24 @ 20:30)      INFECTIOUS DISEASES TESTING      INFLAMMATORY MARKERS      RADIOLOGY & ADDITIONAL TESTS:  I have personally reviewed the last Chest xray  CXR      CT      CARDIOLOGY TESTING       MEDICATIONS  atorvastatin 40 Oral at bedtime  dextrose 5%. 1000 IV Continuous <Continuous>  dextrose 5%. 1000 IV Continuous <Continuous>  dextrose 50% Injectable 25 IV Push once  dextrose 50% Injectable 25 IV Push once  dextrose 50% Injectable 12.5 IV Push once  enoxaparin Injectable 40 SubCutaneous every 24 hours  glucagon  Injectable 1 IntraMuscular once  insulin lispro (ADMELOG) corrective regimen sliding scale  SubCutaneous three times a day before meals  lisinopril 2.5 Oral daily  propranolol 10 Oral two times a day      ANTIBIOTICS:      ALLERGIES:  No Known Allergies

## 2024-10-10 NOTE — CONSULT NOTE ADULT - SUBJECTIVE AND OBJECTIVE BOX
Plastic Surgery Consult Note  ---------------------------------------    Chief Complaint:  Patient is a 59y old  Male who presents with a chief complaint of Dog bite (10 Oct 2024 08:47)     used for the entirety of consultation    HPI:   59M RHD with hx of deaf, htn, dm, hcc, hep c presented to ed with infected dog bite. Pt was seen in ed 09/25 after he was bitten on his L hand by his sons dog. Pt was seen in ed received tdap and finished course of Augmentin. Pt went to pmd today for follow up of wound and was sent to ed for further eval. He denies any assoc fever/chills, nausea, vomiting, paresthesias, decreased sensation, decreased rom.  Vitals in ED were unremarkable  Labs significant for Plt  count 93(stable since last admission), AST/ALT/ ALP: 54/54/175, TBili 1.3 (10 Oct 2024 00:39)    Plastic surgery consulted to evaluate left hand wound and r/o infection. Patient details no pain associated with his dog bite wound and instead states he feels it has been healing and that the surrounding tissue feels tight.     PMH  No pertinent past medical history    DM (diabetes mellitus)    Liver cancer    History of hepatitis C    Hepatocellular carcinoma    HTN (hypertension)        PSH  No significant past surgical history    Encounter for ablation of neoplasm of liver        MEDS  Home Medications:  atorvastatin 40 mg oral tablet: 1 tab(s) orally once a day (at bedtime) (20 Feb 2024 08:31)  Jardiance 10 mg oral tablet: 1 tab(s) orally once a day (20 Feb 2024 08:31)  lisinopril 2.5 mg oral tablet: 1 tab(s) orally once a day (20 Feb 2024 08:31)  Ozempic 4 mg/3 mL (1 mg dose) subcutaneous solution: 1 milligram(s) subcutaneously once a week mondays (20 Feb 2024 08:31)    Allergies    No Known Allergies    Intolerances        Social  Social History:        Physical Exam  Gen: NAD, comfortable  CV: S1, S2, RRR  Pulm: CTA B/L  Abd: Soft, ND, NTP, no rebound, no guarding  Ext: LUE: small healing superficial wound in the dorsum of the 2nd 3rd digit webspace. No expressible pus or fluid. No periwound erythema. Small punctate areas of scarring over dorsum of 2nd digit. Good capillary refill and normal strength with all digits full active and passive motion.     T(C): 36.8 (10-10-24 @ 13:41), Max: 36.9 (10-10-24 @ 00:08)  HR: 74 (10-10-24 @ 13:41) (74 - 77)  BP: 105/64 (10-10-24 @ 13:41) (105/64 - 116/69)  RR: 18 (10-10-24 @ 13:41) (17 - 18)  SpO2: 98% (10-10-24 @ 02:29) (97% - 98%)  Wt(kg): --  Tmax: T(C): , Max: 36.9 (10-10-24 @ 00:08)  Wt(kg): --      Labs:                        14.2   3.44  )-----------( 79       ( 10 Oct 2024 07:28 )             39.6     10-10    133[L]  |  100  |  16  ----------------------------<  321[H]  3.9   |  23  |  0.6[L]    Ca    8.8      10 Oct 2024 07:28  Mg     1.7     10-10    TPro  6.3  /  Alb  3.5  /  TBili  1.3[H]  /  DBili  x   /  AST  40  /  ALT  46[H]  /  AlkPhos  184[H]  10-10      Urinalysis Basic - ( 10 Oct 2024 07:28 )    Color: x / Appearance: x / SG: x / pH: x  Gluc: 321 mg/dL / Ketone: x  / Bili: x / Urobili: x   Blood: x / Protein: x / Nitrite: x   Leuk Esterase: x / RBC: x / WBC x   Sq Epi: x / Non Sq Epi: x / Bacteria: x       Methotrexate Counseling:  Patient counseled regarding adverse effects of methotrexate including but not limited to nausea, vomiting, abnormalities in liver function tests. Patients may develop mouth sores, rash, diarrhea, and abnormalities in blood counts. The patient understands that monitoring is required including LFT's and blood counts.  There is a rare possibility of scarring of the liver and lung problems that can occur when taking methotrexate. Persistent nausea, loss of appetite, pale stools, dark urine, cough, and shortness of breath should be reported immediately. Patient advised to discontinue methotrexate treatment at least three months before attempting to become pregnant.  I discussed the need for folate supplements while taking methotrexate.  These supplements can decrease side effects during methotrexate treatment. The patient verbalized understanding of the proper use and possible adverse effects of methotrexate.  All of the patient's questions and concerns were addressed.

## 2024-10-10 NOTE — H&P ADULT - NSHPPHYSICALEXAM_GEN_ALL_CORE
GENERAL: No acute distress, well-developed  CHEST/LUNG: Clear to auscultation bilaterally; No wheeze, equal breath sounds bilaterally, respirations nonlabored  HEART: Regular rate and rhythm; No murmurs, rubs, or gallops  ABDOMEN: Soft, nontender, nondistended;   NEUROLOGY: AAOx3, non-focal, moves all extremities spontaneously  Extremities: Ulcer present on L hand, does not look inflamed

## 2024-10-10 NOTE — H&P ADULT - ASSESSMENT
A&P  #L hand ulcer  #Dog bite  -No SIRs, HD stable  -Not inflamed , no tenderness on palpation  -Received TDAP vaccine on 9/25  -C/o mild pain  -Completed course of augmentin  -sp ceftriaxone and flagyl in ED. Giving 1 dose vanc now  -fu ID consult  -Wound care cs    #Thrombocytopenia  -Likely in setting of Hep C    #Hep c complicated by HCC  #HLD  #HTN  -cw home meds    #DM  -On jardiance and ozempic at home  -ISS   -FS goal 140-180      #Diet: DASH/TLC  #DVT Prophylaxis:  Lovenox 40mg SubQ qHS  #Activity: Ambulate as tolerated   A&P  #L hand ulcer  #Dog bite  -No SIRs, HD stable  -Not inflamed , no tenderness on palpation  -Received TDAP vaccine on 9/25  -C/o mild pain  -Completed course of augmentin  -sp ceftriaxone and flagyl in ED. Giving 1 dose vanc now  -fu ID consult  -Wound care cs    #Thrombocytopenia  -Likely in setting of Hep C    #Hep c complicated by HCC  #HLD  #HTN  -cw home meds    #DM  -On jardiance and ozempic at home  -ISS   -FS goal 140-180    Used interpret services for  ID: [id number]    #Diet: DASH/TLC  #DVT Prophylaxis:  Lovenox 40mg SubQ qHS  #Activity: Ambulate as tolerated

## 2024-10-11 ENCOUNTER — TRANSCRIPTION ENCOUNTER (OUTPATIENT)
Age: 59
End: 2024-10-11

## 2024-10-11 ENCOUNTER — NON-APPOINTMENT (OUTPATIENT)
Age: 59
End: 2024-10-11

## 2024-10-11 VITALS
DIASTOLIC BLOOD PRESSURE: 66 MMHG | RESPIRATION RATE: 18 BRPM | SYSTOLIC BLOOD PRESSURE: 105 MMHG | HEART RATE: 83 BPM | TEMPERATURE: 98 F

## 2024-10-11 LAB
ALBUMIN SERPL ELPH-MCNC: 3.5 G/DL — SIGNIFICANT CHANGE UP (ref 3.5–5.2)
ALP SERPL-CCNC: 166 U/L — HIGH (ref 30–115)
ALT FLD-CCNC: 43 U/L — HIGH (ref 0–41)
ANION GAP SERPL CALC-SCNC: 9 MMOL/L — SIGNIFICANT CHANGE UP (ref 7–14)
AST SERPL-CCNC: 41 U/L — SIGNIFICANT CHANGE UP (ref 0–41)
BILIRUB SERPL-MCNC: 1.6 MG/DL — HIGH (ref 0.2–1.2)
BUN SERPL-MCNC: 17 MG/DL — SIGNIFICANT CHANGE UP (ref 10–20)
CALCIUM SERPL-MCNC: 9.1 MG/DL — SIGNIFICANT CHANGE UP (ref 8.4–10.5)
CHLORIDE SERPL-SCNC: 104 MMOL/L — SIGNIFICANT CHANGE UP (ref 98–110)
CO2 SERPL-SCNC: 25 MMOL/L — SIGNIFICANT CHANGE UP (ref 17–32)
CREAT SERPL-MCNC: 0.5 MG/DL — LOW (ref 0.7–1.5)
CRP SERPL-MCNC: 7.6 MG/L — HIGH
EGFR: 118 ML/MIN/1.73M2 — SIGNIFICANT CHANGE UP
ERYTHROCYTE [SEDIMENTATION RATE] IN BLOOD: 12 MM/HR — HIGH (ref 0–10)
GLUCOSE BLDC GLUCOMTR-MCNC: 167 MG/DL — HIGH (ref 70–99)
GLUCOSE BLDC GLUCOMTR-MCNC: 239 MG/DL — HIGH (ref 70–99)
GLUCOSE BLDC GLUCOMTR-MCNC: 271 MG/DL — HIGH (ref 70–99)
GLUCOSE SERPL-MCNC: 168 MG/DL — HIGH (ref 70–99)
HCT VFR BLD CALC: 41.5 % — LOW (ref 42–52)
HGB BLD-MCNC: 14.9 G/DL — SIGNIFICANT CHANGE UP (ref 14–18)
MAGNESIUM SERPL-MCNC: 1.6 MG/DL — LOW (ref 1.8–2.4)
MCHC RBC-ENTMCNC: 33.8 PG — HIGH (ref 27–31)
MCHC RBC-ENTMCNC: 35.9 G/DL — SIGNIFICANT CHANGE UP (ref 32–37)
MCV RBC AUTO: 94.1 FL — HIGH (ref 80–94)
NRBC # BLD: 0 /100 WBCS — SIGNIFICANT CHANGE UP (ref 0–0)
PLATELET # BLD AUTO: 82 K/UL — LOW (ref 130–400)
PMV BLD: 10.4 FL — SIGNIFICANT CHANGE UP (ref 7.4–10.4)
POTASSIUM SERPL-MCNC: 3.8 MMOL/L — SIGNIFICANT CHANGE UP (ref 3.5–5)
POTASSIUM SERPL-SCNC: 3.8 MMOL/L — SIGNIFICANT CHANGE UP (ref 3.5–5)
PROT SERPL-MCNC: 6.2 G/DL — SIGNIFICANT CHANGE UP (ref 6–8)
RBC # BLD: 4.41 M/UL — LOW (ref 4.7–6.1)
RBC # FLD: 13.7 % — SIGNIFICANT CHANGE UP (ref 11.5–14.5)
SODIUM SERPL-SCNC: 138 MMOL/L — SIGNIFICANT CHANGE UP (ref 135–146)
WBC # BLD: 4.27 K/UL — LOW (ref 4.8–10.8)
WBC # FLD AUTO: 4.27 K/UL — LOW (ref 4.8–10.8)

## 2024-10-11 PROCEDURE — 99232 SBSQ HOSP IP/OBS MODERATE 35: CPT

## 2024-10-11 PROCEDURE — 73219 MRI UPPER EXTREMITY W/DYE: CPT | Mod: 26,LT

## 2024-10-11 RX ORDER — MAGNESIUM SULFATE 500 MG/ML
2 VIAL (ML) INJECTION ONCE
Refills: 0 | Status: COMPLETED | OUTPATIENT
Start: 2024-10-11 | End: 2024-10-11

## 2024-10-11 RX ADMIN — Medication 3: at 17:37

## 2024-10-11 RX ADMIN — Medication 25 GRAM(S): at 12:04

## 2024-10-11 RX ADMIN — AMPICILLIN, SULBACTAM 200 GRAM(S): 250; 125 INJECTION, POWDER, FOR SOLUTION INTRAMUSCULAR; INTRAVENOUS at 06:09

## 2024-10-11 RX ADMIN — ENOXAPARIN SODIUM 40 MILLIGRAM(S): 150 INJECTION SUBCUTANEOUS at 06:13

## 2024-10-11 RX ADMIN — Medication 2.5 MILLIGRAM(S): at 06:23

## 2024-10-11 RX ADMIN — Medication 4 UNIT(S): at 17:37

## 2024-10-11 RX ADMIN — Medication 2: at 12:04

## 2024-10-11 RX ADMIN — AMPICILLIN, SULBACTAM 200 GRAM(S): 250; 125 INJECTION, POWDER, FOR SOLUTION INTRAMUSCULAR; INTRAVENOUS at 17:38

## 2024-10-11 RX ADMIN — Medication 1: at 08:31

## 2024-10-11 NOTE — DISCHARGE NOTE NURSING/CASE MANAGEMENT/SOCIAL WORK - NSDCPEFALRISK_GEN_ALL_CORE
For information on Fall & Injury Prevention, visit: [email or web address] OR  [email or web address] OR  https://www.cdc.gov/steadi/patient.html

## 2024-10-11 NOTE — PROGRESS NOTE ADULT - SUBJECTIVE AND OBJECTIVE BOX
Plastic Surgery    SUBJECTIVE: Pt seen and examined on rounds with team. NAEON.      VITALS  T(C): 36.7 (10-11-24 @ 04:59), Max: 36.8 (10-10-24 @ 13:41)  HR: 77 (10-11-24 @ 04:59) (74 - 77)  BP: 118/75 (10-11-24 @ 04:59) (94/55 - 118/75)  RR: 18 (10-11-24 @ 04:59) (18 - 18)  SpO2: --  CAPILLARY BLOOD GLUCOSE      POCT Blood Glucose.: 234 mg/dL (10 Oct 2024 21:31)  POCT Blood Glucose.: 195 mg/dL (10 Oct 2024 16:37)  POCT Blood Glucose.: 312 mg/dL (10 Oct 2024 11:33)  POCT Blood Glucose.: 329 mg/dL (10 Oct 2024 07:18)      Is/Os  Physical Exam  Gen: NAD, comfortable  CV: S1, S2, RRR  Pulm: CTA B/L  Abd: Soft, ND, NTP, no rebound, no guarding  Ext: LUE: small healing superficial wound in the dorsum of the 2nd 3rd digit webspace. No expressible pus or fluid. No periwound erythema. Small punctate areas of scarring over dorsum of 2nd digit. Good capillary refill and normal strength with all digits full active and passive motion.      LABS  CBC (10-10 @ 07:28)                              14.2                           3.44[L]  )----------------(  79[L]      66.0  % Neutrophils, 23.5  % Lymphocytes, ANC: 2.27                                39.6[L]  CBC (10-09 @ 20:30)                              14.7                           4.66[L]  )----------------(  93[L]      67.9  % Neutrophils, 20.4[L]% Lymphocytes, ANC: 3.17                                40.7[L]    BMP (10-10 @ 07:28)             133[L]  |  100     |  16    		Ca++ --      Ca 8.8                ---------------------------------( 321[H]		Mg 1.7[L]             3.9     |  23      |  0.6[L]			Ph --      BMP (10-09 @ 20:30)             140     |  105     |  14    		Ca++ --      Ca 9.1                ---------------------------------( 195[H]		Mg --                 4.4     |  24      |  0.7   			Ph --        LFTs (10-10 @ 07:28)      TPro 6.3 / Alb 3.5 / TBili 1.3[H] / DBili -- / AST 40 / ALT 46[H] / AlkPhos 184[H]  LFTs (10-09 @ 20:30)      TPro 6.7 / Alb 3.7 / TBili 1.3[H] / DBili -- / AST 54[H] / ALT 54[H] / AlkPhos 175[H]        ABG (10-09 @ 20:30)      /  /  /  /  / %     Lactate:  2.0

## 2024-10-11 NOTE — PROGRESS NOTE ADULT - ASSESSMENT
59M RHD and deaf, w/ hx of HCC, hep C, and dog bite on 09/25 that presents to ED with poorly controlled diabetes and worsening hand infx per primary care doctor. Upon evaluate wound appears free of infection and undergoing normal stages of healing. Some exposed subcutaneous fat in the 2nd webspace but no pus or expressible fluid. Xray of hand unremarkable.     Plan:  - continue abx per ID doctors  - recommend washing of hands with warm soap and water, okay to do 15min TID hand soaks as well  - glycemic control  - dry dressing applied to protect wound sites        Plastic Surgery  [id number] [facility] pager  [phone] [facility] pager  [facility] Green Team [id number]  Available on Teams

## 2024-10-11 NOTE — DISCHARGE NOTE PROVIDER - NSDCFUSCHEDAPPT_GEN_ALL_CORE_FT
Todd Howell [facility] Pre[facility]mits  Scheduled Appointment: 10/23/2024    [facility]  [place] [id number] [address]   Scheduled Appointment: 10/23/2024

## 2024-10-11 NOTE — DISCHARGE NOTE PROVIDER - NSDCMRMEDTOKEN_GEN_ALL_CORE_FT
atorvastatin 40 mg oral tablet: 1 tab(s) orally once a day (at bedtime)  Florastor 250 mg oral capsule: 1 cap(s) orally once a day  Jardiance 10 mg oral tablet: 1 tab(s) orally once a day  lisinopril 2.5 mg oral tablet: 1 tab(s) orally once a day  Ozempic 4 mg/3 mL (1 mg dose) subcutaneous solution: 1 milligram(s) subcutaneously once a week mondays  propranolol 10 mg oral tablet: 1 tab(s) orally 2 times a day

## 2024-10-11 NOTE — PROGRESS NOTE ADULT - ASSESSMENT
59 year old M with hx of deaf, htn, dm, hcc, hep c presented to ed with infected dog bite. Pt was seen in ed 09/25 after he was bitten on his L hand by his sons dog. Pt was seen in ed received tdap and finished course of Augmentin.     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  which could results in poor clinical outcomes  Received TDAP vaccine on 9/25  Completed coures of augmentin  XR Hand Left 10/09 showing soft tissue swelling overlying the second digit, distal phalanx  No intervention warranted per hand surgery team  dw ID  - fu MR Hand Left (no metal in body per pt)  - IV unasyn  - DC planning if MR Hand is negative for OM/abscesses    #Deaf  In-house  available (See sign next to patient to contact)    #Thrombocytopenia  -Likely in setting of Hep C    #Hep c complicated by HCC  #HLD  #HTN  -cw home meds    #DM  -On jardiance and ozempic at home  -ISS   -FS goal 140-180    DVT Prophylaxis:  Lovenox 40mg SubQ qHS    #Progress Note Handoff  Pending (specify): Cont IV abx, MR hand  Family discussion: dw pt regarding plan of care  Disposition: GMF, from home .

## 2024-10-11 NOTE — DISCHARGE NOTE PROVIDER - NSDCCPCAREPLAN_GEN_ALL_CORE_FT
PRINCIPAL DISCHARGE DIAGNOSIS  Diagnosis: Infected dog bite  Assessment and Plan of Treatment: Your wound was seen and assessed by ID, no need for abx, xr does not show any foreign bodies or signs of infection. The poor wound healing is likely related to high blood sugar. Hand surgery assessed your hand and recommended no surgery at this time. You underwent an MRI to r/u bone infection. You shoulf f/u on result, but there is very low suspicion for an infection and you can be safely discharged. You should follow up with your primary care doctor within a week.

## 2024-10-11 NOTE — DISCHARGE NOTE NURSING/CASE MANAGEMENT/SOCIAL WORK - PATIENT PORTAL LINK FT
You can access the FollowMyHealth Patient Portal offered by [facility] by registering at the following website: [email or web address]. By joining [employer]’s FollowMyHealth portal, you will also be able to view your health information using other applications (apps) compatible with our system.

## 2024-10-11 NOTE — DISCHARGE NOTE NURSING/CASE MANAGEMENT/SOCIAL WORK - NSFLUVACAGEDISCH_IMM_ALL_CORE
[FreeTextEntry1] : Cheng Pierce presents for follow-up of hearing after failed hearing screen twice in the left ear. Audiogram at initial visit showed type A tymp AD, type B tymp AS, normal hearing AD, and borderline normal/slight conductive hearing loss AS. He had allergy testing positive for mouse. Audiogram at last visit type A tymp AD, type C tymp AS, normal hearing AD, slight to mild mixed hearing loss AS. He has continued pediatric flonase for his chronic rhinitis and eustachian tube dysfunction. Otoscopic exam today is normal. Audiogram was performed and reviewed showing type A tymps AU, normal hearing AD, and normal hearing to slight hearing loss AS. Discussed that further intervention is not needed at this time.  - Continue pediatric flonase 1 spray to each nostril BID PRN. - Follow up PRN.   Adult

## 2024-10-11 NOTE — PROGRESS NOTE ADULT - SUBJECTIVE AND OBJECTIVE BOX
JUDI DA SILVA  59y, Male  Allergy: No Known Allergies    Hospital Day: 2d    Patient seen and examined. No acute events overnight    PMH/PSH:  PAST MEDICAL & SURGICAL HISTORY:  DM (diabetes mellitus)      Liver cancer      History of hepatitis C      Hepatocellular carcinoma      HTN (hypertension)      Encounter for ablation of neoplasm of liver          VITALS:  T(F): 98.1 (10-11-24 @ 13:28), Max: 98.2 (10-10-24 @ 19:40)  HR: 78 (10-11-24 @ 13:28)  BP: 108/70 (10-11-24 @ 13:28) (94/55 - 118/75)  RR: 18 (10-11-24 @ 13:28)  SpO2: --    TESTS & MEASUREMENTS:  Weight (Kg): 92.1 (10-09-24 @ 15:37)  BMI (kg/m2): 30 (10-09)                          14.9   4.27  )-----------( 82       ( 11 Oct 2024 06:44 )             41.5       10-11    138  |  104  |  17  ----------------------------<  168[H]  3.8   |  25  |  0.5[L]    Ca    9.1      11 Oct 2024 06:44  Mg     1.6     10-11    TPro  6.2  /  Alb  3.5  /  TBili  1.6[H]  /  DBili  x   /  AST  41  /  ALT  43[H]  /  AlkPhos  166[H]  10-11    LIVER FUNCTIONS - ( 11 Oct 2024 06:44 )  Alb: 3.5 g/dL / Pro: 6.2 g/dL / ALK PHOS: 166 U/L / ALT: 43 U/L / AST: 41 U/L / GGT: x                 Culture - Blood (collected 10-09-24 @ 19:42)  Source: .Blood BLOOD  Preliminary Report (10-11-24 @ 02:02):    No growth at 24 hours    Culture - Blood (collected 10-09-24 @ 19:42)  Source: .Blood BLOOD  Preliminary Report (10-11-24 @ 02:02):    No growth at 24 hours      Urinalysis Basic - ( 11 Oct 2024 06:44 )    Color: x / Appearance: x / SG: x / pH: x  Gluc: 168 mg/dL / Ketone: x  / Bili: x / Urobili: x   Blood: x / Protein: x / Nitrite: x   Leuk Esterase: x / RBC: x / WBC x   Sq Epi: x / Non Sq Epi: x / Bacteria: x        RADIOLOGY & ADDITIONAL TESTS:    RECENT DIAGNOSTIC ORDERS:      MEDICATIONS:  MEDICATIONS  (STANDING):  ampicillin/sulbactam  IVPB      ampicillin/sulbactam  IVPB 3 Gram(s) IV Intermittent every 6 hours  atorvastatin 40 milliGRAM(s) Oral at bedtime  dextrose 5%. 1000 milliLiter(s) (100 mL/Hr) IV Continuous <Continuous>  dextrose 5%. 1000 milliLiter(s) (50 mL/Hr) IV Continuous <Continuous>  dextrose 50% Injectable 25 Gram(s) IV Push once  dextrose 50% Injectable 12.5 Gram(s) IV Push once  dextrose 50% Injectable 25 Gram(s) IV Push once  enoxaparin Injectable 40 milliGRAM(s) SubCutaneous every 24 hours  glucagon  Injectable 1 milliGRAM(s) IntraMuscular once  insulin glargine Injectable (LANTUS) 12 Unit(s) SubCutaneous at bedtime  insulin lispro (ADMELOG) corrective regimen sliding scale   SubCutaneous three times a day before meals  insulin lispro Injectable (ADMELOG) 4 Unit(s) SubCutaneous three times a day before meals  lisinopril 2.5 milliGRAM(s) Oral daily  propranolol 10 milliGRAM(s) Oral two times a day    MEDICATIONS  (PRN):  dextrose Oral Gel 15 Gram(s) Oral once PRN Blood Glucose LESS THAN 70 milliGRAM(s)/deciliter      HOME MEDICATIONS:  atorvastatin 40 mg oral tablet (02-20)  Jardiance 10 mg oral tablet (02-20)  lisinopril 2.5 mg oral tablet (02-20)  Ozempic 4 mg/3 mL (1 mg dose) subcutaneous solution (02-20)      REVIEW OF SYSTEMS:  All other review of systems is negative unless indicated above.     PHYSICAL EXAM:  PHYSICAL EXAM:  GENERAL: NAD  HEAD:  Atraumatic, Normocephalic  NECK: Supple, No JVD  CHEST/LUNG: Clear to auscultation bilaterally; No wheeze  HEART: Regular rate and rhythm; No murmurs, rubs, or gallops  ABDOMEN: Soft, Nontender, Nondistended; Bowel sounds present  EXTREMITIES:  2+ Peripheral Pulses, No clubbing, cyanosis, or edema; left hand in wound dressing  SKIN: No rashes or lesions

## 2024-10-11 NOTE — DISCHARGE NOTE PROVIDER - CARE PROVIDER_API CALL
Todd Howell  Internal Medicine  [address] [place] [address], Admin - Room [address] [place] [address]  Phone: [phone]  Fax: [phone]  Follow Up Time: 1 week

## 2024-10-11 NOTE — DISCHARGE NOTE PROVIDER - HOSPITAL COURSE
History of Present Illness:    59 year old M with hx of deaf, htn, dm, hcc, hep c presented to ed with infected dog bite. Pt was seen in ed 09/25 after he was bitten on his L hand by his sons dog. Pt was seen in ed received tdap and finished course of Augmentin. Pt went to pmd today for follow up of wound and was sent to ed for further eval. He denies any assoc fever/chills, nausea, vomiting, paresthesias, decreased sensation, decreased rom.  Vitals in ED were unremarkable  Labs significant for Plt  count 93(stable since last admission), AST/ALT/ ALP: 54/54/175, TBili 1.3.   patient's hand wound was seen and assessed by ID, no need for antibiotics, wound does not look infected.   Patient also was assessed by hand surgery, no interventions recommended at this time. Wound looks clean and xr done does not show any foreign bodies or subcutaneous emphysema.

## 2024-10-11 NOTE — DISCHARGE NOTE NURSING/CASE MANAGEMENT/SOCIAL WORK - NSDCVIVACCINE_GEN_ALL_CORE_FT
Tdap; 25-Sep-2024 15:49; Aby Larson (RN); [employer]; 0000 (Exp. Date: 25-Sep-2024); IntraMuscular; Deltoid Left.; 0.5 milliLiter(s); VIS (VIS Published: 09-May-2013, VIS Presented: 25-Sep-2024);

## 2024-10-14 LAB
ALBUMIN SERPL ELPH-MCNC: 3.9 G/DL
ALP BLD-CCNC: 195 U/L
ALT SERPL-CCNC: 56 U/L
ANION GAP SERPL CALC-SCNC: 12 MMOL/L
AST SERPL-CCNC: 50 U/L
BILIRUB SERPL-MCNC: 1.7 MG/DL
BUN SERPL-MCNC: 13 MG/DL
CALCIUM SERPL-MCNC: 9.3 MG/DL
CHLORIDE SERPL-SCNC: 105 MMOL/L
CHOLEST SERPL-MCNC: 130 MG/DL
CO2 SERPL-SCNC: 24 MMOL/L
CREAT SERPL-MCNC: 0.5 MG/DL
EGFR: 118 ML/MIN/1.73M2
ESTIMATED AVERAGE GLUCOSE: 160 MG/DL
GLUCOSE SERPL-MCNC: 271 MG/DL
HBA1C MFR BLD HPLC: 7.2 %
HCT VFR BLD CALC: 41.5 %
HDLC SERPL-MCNC: 60 MG/DL
HGB BLD-MCNC: 15.1 G/DL
LDLC SERPL CALC-MCNC: 55 MG/DL
MCHC RBC-ENTMCNC: 33.8 PG
MCHC RBC-ENTMCNC: 36.4 G/DL
MCV RBC AUTO: 92.8 FL
NONHDLC SERPL-MCNC: 70 MG/DL
PLATELET # BLD AUTO: 96 K/UL
PMV BLD AUTO: 0 /100 WBCS
PMV BLD: 10.4 FL
POTASSIUM SERPL-SCNC: 4 MMOL/L
PROT SERPL-MCNC: 6.7 G/DL
RBC # BLD: 4.47 M/UL
RBC # FLD: 13.4 %
SODIUM SERPL-SCNC: 141 MMOL/L
TRIGL SERPL-MCNC: 77 MG/DL
WBC # FLD AUTO: 4.54 K/UL

## 2024-10-18 DIAGNOSIS — S61.452A OPEN BITE OF LEFT HAND, INITIAL ENCOUNTER: ICD-10-CM

## 2024-10-18 DIAGNOSIS — Z85.05 PERSONAL HISTORY OF MALIGNANT NEOPLASM OF LIVER: ICD-10-CM

## 2024-10-18 DIAGNOSIS — I10 ESSENTIAL (PRIMARY) HYPERTENSION: ICD-10-CM

## 2024-10-18 DIAGNOSIS — D69.6 THROMBOCYTOPENIA, UNSPECIFIED: ICD-10-CM

## 2024-10-18 DIAGNOSIS — D84.9 IMMUNODEFICIENCY, UNSPECIFIED: ICD-10-CM

## 2024-10-18 DIAGNOSIS — B19.20 UNSPECIFIED VIRAL HEPATITIS C WITHOUT HEPATIC COMA: ICD-10-CM

## 2024-10-18 DIAGNOSIS — E11.9 TYPE 2 DIABETES MELLITUS WITHOUT COMPLICATIONS: ICD-10-CM

## 2024-10-18 DIAGNOSIS — L03.114 CELLULITIS OF LEFT UPPER LIMB: ICD-10-CM

## 2024-10-18 DIAGNOSIS — H91.90 UNSPECIFIED HEARING LOSS, UNSPECIFIED EAR: ICD-10-CM

## 2024-10-23 ENCOUNTER — OUTPATIENT (OUTPATIENT)
Dept: OUTPATIENT SERVICES | Facility: HOSPITAL | Age: 59
LOS: 1 days | End: 2024-10-23
Payer: MEDICAID

## 2024-10-23 ENCOUNTER — NON-APPOINTMENT (OUTPATIENT)
Age: 59
End: 2024-10-23

## 2024-10-23 ENCOUNTER — RX RENEWAL (OUTPATIENT)
Age: 59
End: 2024-10-23

## 2024-10-23 ENCOUNTER — APPOINTMENT (OUTPATIENT)
Dept: INTERNAL MEDICINE | Facility: CLINIC | Age: 59
End: 2024-10-23
Payer: MEDICAID

## 2024-10-23 VITALS
DIASTOLIC BLOOD PRESSURE: 66 MMHG | WEIGHT: 200 LBS | SYSTOLIC BLOOD PRESSURE: 104 MMHG | HEART RATE: 88 BPM | TEMPERATURE: 98 F | BODY MASS INDEX: 29.62 KG/M2 | HEIGHT: 69 IN | OXYGEN SATURATION: 96 %

## 2024-10-23 DIAGNOSIS — F10.90 ALCOHOL USE, UNSPECIFIED, UNCOMPLICATED: ICD-10-CM

## 2024-10-23 DIAGNOSIS — I85.10 UNSPECIFIED CIRRHOSIS OF LIVER: ICD-10-CM
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
## 2024-10-23 DIAGNOSIS — Z92.89 PERSONAL HISTORY OF OTHER MEDICAL TREATMENT: ICD-10-CM

## 2024-10-23 DIAGNOSIS — Z00.00 ENCOUNTER FOR GENERAL ADULT MEDICAL EXAMINATION W/OUT ABNORMAL FINDINGS: ICD-10-CM

## 2024-10-23 DIAGNOSIS — B18.2 CHRONIC VIRAL HEPATITIS C: ICD-10-CM

## 2024-10-23 DIAGNOSIS — D49.0 NEOPLASM OF UNSPECIFIED BEHAVIOR OF DIGESTIVE SYSTEM: Chronic | ICD-10-CM

## 2024-10-23 DIAGNOSIS — F17.290 NICOTINE DEPENDENCE, OTHER TOBACCO PRODUCT, UNCOMPLICATED: ICD-10-CM

## 2024-10-23 DIAGNOSIS — Z00.00 ENCOUNTER FOR GENERAL ADULT MEDICAL EXAMINATION WITHOUT ABNORMAL FINDINGS: ICD-10-CM

## 2024-10-23 DIAGNOSIS — I10 ESSENTIAL (PRIMARY) HYPERTENSION: ICD-10-CM

## 2024-10-23 DIAGNOSIS — K74.60 UNSPECIFIED CIRRHOSIS OF LIVER: ICD-10-CM

## 2024-10-23 DIAGNOSIS — E11.42 TYPE 2 DIABETES MELLITUS WITH DIABETIC POLYNEUROPATHY: ICD-10-CM

## 2024-10-23 PROCEDURE — G2211 COMPLEX E/M VISIT ADD ON: CPT | Mod: NC

## 2024-10-23 PROCEDURE — 99214 OFFICE O/P EST MOD 30 MIN: CPT

## 2024-10-23 PROCEDURE — 99213 OFFICE O/P EST LOW 20 MIN: CPT

## 2024-10-23 PROCEDURE — T1013: CPT

## 2024-10-23 RX ORDER — PROPRANOLOL HYDROCHLORIDE 10 MG/1
10 TABLET ORAL TWICE DAILY
Qty: 60 | Refills: 4 | Status: ACTIVE | COMMUNITY
Start: 2024-10-23 | End: 1900-01-01

## 2024-10-25 ENCOUNTER — APPOINTMENT (OUTPATIENT)
Dept: CARDIOTHORACIC SURGERY | Facility: CLINIC | Age: 59
End: 2024-10-25
Payer: MEDICAID

## 2024-10-25 VITALS — HEIGHT: 69 IN | WEIGHT: 200 LBS | BODY MASS INDEX: 29.62 KG/M2

## 2024-10-25 PROCEDURE — G0296 VISIT TO DETERM LDCT ELIG: CPT
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
## 2024-10-31 DIAGNOSIS — F17.290 NICOTINE DEPENDENCE, OTHER TOBACCO PRODUCT, UNCOMPLICATED: ICD-10-CM

## 2024-10-31 DIAGNOSIS — K74.60 UNSPECIFIED CIRRHOSIS OF LIVER: ICD-10-CM

## 2024-10-31 DIAGNOSIS — F10.90 ALCOHOL USE, UNSPECIFIED, UNCOMPLICATED: ICD-10-CM

## 2024-10-31 DIAGNOSIS — I10 ESSENTIAL (PRIMARY) HYPERTENSION: ICD-10-CM

## 2024-10-31 DIAGNOSIS — Z00.00 ENCOUNTER FOR GENERAL ADULT MEDICAL EXAMINATION WITHOUT ABNORMAL FINDINGS: ICD-10-CM

## 2024-10-31 DIAGNOSIS — E11.42 TYPE 2 DIABETES MELLITUS WITH DIABETIC POLYNEUROPATHY: ICD-10-CM

## 2024-10-31 DIAGNOSIS — B18.2 CHRONIC VIRAL HEPATITIS C: ICD-10-CM

## 2025-01-01 NOTE — ASU DISCHARGE PLAN (ADULT/PEDIATRIC) - BATHING
Monitored by specialist- no acute findings meriting change in the plan  
Reports of possible pink eye. Right eye is redden, crusted shut and very itchy. Tried a warm compress without relief.     Patient would like communication of their results via:        Cell Phone:   Telephone Information:   Mobile [phone]     Okay to leave a message containing results? Yes  
No change

## 2025-01-23 ENCOUNTER — APPOINTMENT (OUTPATIENT)
Dept: INTERNAL MEDICINE | Facility: CLINIC | Age: 60
End: 2025-01-23

## 2025-02-26 ENCOUNTER — OUTPATIENT (OUTPATIENT)
Dept: OUTPATIENT SERVICES | Facility: HOSPITAL | Age: 60
LOS: 1 days | End: 2025-02-26
Payer: MEDICAID

## 2025-02-26 ENCOUNTER — RESULT REVIEW (OUTPATIENT)
Age: 60
End: 2025-02-26

## 2025-02-26 DIAGNOSIS — K74.60 UNSPECIFIED CIRRHOSIS OF LIVER: ICD-10-CM

## 2025-02-26 DIAGNOSIS — D49.0 NEOPLASM OF UNSPECIFIED BEHAVIOR OF DIGESTIVE SYSTEM: Chronic | ICD-10-CM

## 2025-02-26 PROCEDURE — A9579: CPT

## 2025-02-26 PROCEDURE — 74183 MRI ABD W/O CNTR FLWD CNTR: CPT | Mod: 26

## 2025-02-26 PROCEDURE — 74183 MRI ABD W/O CNTR FLWD CNTR: CPT

## 2025-02-27 DIAGNOSIS — K74.60 UNSPECIFIED CIRRHOSIS OF LIVER: ICD-10-CM

## 2025-03-21 ENCOUNTER — APPOINTMENT (OUTPATIENT)
Dept: INTERVENTIONAL RADIOLOGY/VASCULAR | Facility: CLINIC | Age: 60
End: 2025-03-21
Payer: MEDICAID

## 2025-03-21 VITALS
OXYGEN SATURATION: 99 % | DIASTOLIC BLOOD PRESSURE: 80 MMHG | TEMPERATURE: 97.2 F | SYSTOLIC BLOOD PRESSURE: 115 MMHG | HEART RATE: 86 BPM

## 2025-03-21 DIAGNOSIS — K74.60 UNSPECIFIED CIRRHOSIS OF LIVER: ICD-10-CM

## 2025-03-21 PROCEDURE — T1013: CPT

## 2025-03-21 PROCEDURE — 99212 OFFICE O/P EST SF 10 MIN: CPT | Mod: 25

## 2025-03-28 ENCOUNTER — OUTPATIENT (OUTPATIENT)
Dept: OUTPATIENT SERVICES | Facility: HOSPITAL | Age: 60
LOS: 1 days | End: 2025-03-28
Payer: MEDICAID

## 2025-03-28 ENCOUNTER — RESULT REVIEW (OUTPATIENT)
Age: 60
End: 2025-03-28

## 2025-03-28 VITALS
WEIGHT: 203.05 LBS | HEART RATE: 76 BPM | TEMPERATURE: 97 F | RESPIRATION RATE: 18 BRPM | HEIGHT: 69 IN | OXYGEN SATURATION: 98 % | SYSTOLIC BLOOD PRESSURE: 113 MMHG | DIASTOLIC BLOOD PRESSURE: 66 MMHG

## 2025-03-28 DIAGNOSIS — D49.0 NEOPLASM OF UNSPECIFIED BEHAVIOR OF DIGESTIVE SYSTEM: Chronic | ICD-10-CM

## 2025-03-28 DIAGNOSIS — C22.0 LIVER CELL CARCINOMA: ICD-10-CM

## 2025-03-28 DIAGNOSIS — Z01.818 ENCOUNTER FOR OTHER PREPROCEDURAL EXAMINATION: ICD-10-CM

## 2025-03-28 LAB
ALBUMIN SERPL ELPH-MCNC: 4.1 G/DL — SIGNIFICANT CHANGE UP (ref 3.5–5.2)
ALP SERPL-CCNC: 176 U/L — HIGH (ref 30–115)
ALT FLD-CCNC: 75 U/L — HIGH (ref 0–41)
ANION GAP SERPL CALC-SCNC: 15 MMOL/L — HIGH (ref 7–14)
APTT BLD: 37.9 SEC — SIGNIFICANT CHANGE UP (ref 27–39.2)
AST SERPL-CCNC: 78 U/L — HIGH (ref 0–41)
BASOPHILS # BLD AUTO: 0.05 K/UL — SIGNIFICANT CHANGE UP (ref 0–0.2)
BASOPHILS NFR BLD AUTO: 0.9 % — SIGNIFICANT CHANGE UP (ref 0–1)
BILIRUB SERPL-MCNC: 2.2 MG/DL — HIGH (ref 0.2–1.2)
BUN SERPL-MCNC: 14 MG/DL — SIGNIFICANT CHANGE UP (ref 10–20)
CALCIUM SERPL-MCNC: 9.5 MG/DL — SIGNIFICANT CHANGE UP (ref 8.4–10.5)
CHLORIDE SERPL-SCNC: 104 MMOL/L — SIGNIFICANT CHANGE UP (ref 98–110)
CO2 SERPL-SCNC: 21 MMOL/L — SIGNIFICANT CHANGE UP (ref 17–32)
CREAT SERPL-MCNC: 0.7 MG/DL — SIGNIFICANT CHANGE UP (ref 0.7–1.5)
EGFR: 107 ML/MIN/1.73M2 — SIGNIFICANT CHANGE UP
EGFR: 107 ML/MIN/1.73M2 — SIGNIFICANT CHANGE UP
EOSINOPHIL # BLD AUTO: 0 K/UL — SIGNIFICANT CHANGE UP (ref 0–0.7)
EOSINOPHIL NFR BLD AUTO: 0 % — SIGNIFICANT CHANGE UP (ref 0–8)
GLUCOSE SERPL-MCNC: 149 MG/DL — HIGH (ref 70–99)
HCT VFR BLD CALC: 45.6 % — SIGNIFICANT CHANGE UP (ref 42–52)
HGB BLD-MCNC: 16.8 G/DL — SIGNIFICANT CHANGE UP (ref 14–18)
IMM GRANULOCYTES NFR BLD AUTO: 0.2 % — SIGNIFICANT CHANGE UP (ref 0.1–0.3)
INR BLD: 1.02 RATIO — SIGNIFICANT CHANGE UP (ref 0.65–1.3)
LYMPHOCYTES # BLD AUTO: 1.37 K/UL — SIGNIFICANT CHANGE UP (ref 1.2–3.4)
LYMPHOCYTES # BLD AUTO: 23.5 % — SIGNIFICANT CHANGE UP (ref 20.5–51.1)
MCHC RBC-ENTMCNC: 34.4 PG — HIGH (ref 27–31)
MCHC RBC-ENTMCNC: 36.8 G/DL — SIGNIFICANT CHANGE UP (ref 32–37)
MCV RBC AUTO: 93.3 FL — SIGNIFICANT CHANGE UP (ref 80–94)
MONOCYTES # BLD AUTO: 0.52 K/UL — SIGNIFICANT CHANGE UP (ref 0.1–0.6)
MONOCYTES NFR BLD AUTO: 8.9 % — SIGNIFICANT CHANGE UP (ref 1.7–9.3)
NEUTROPHILS # BLD AUTO: 3.87 K/UL — SIGNIFICANT CHANGE UP (ref 1.4–6.5)
NEUTROPHILS NFR BLD AUTO: 66.5 % — SIGNIFICANT CHANGE UP (ref 42.2–75.2)
NRBC BLD AUTO-RTO: 0 /100 WBCS — SIGNIFICANT CHANGE UP (ref 0–0)
PLATELET # BLD AUTO: 124 K/UL — LOW (ref 130–400)
PMV BLD: 10.7 FL — HIGH (ref 7.4–10.4)
POTASSIUM SERPL-MCNC: 3.9 MMOL/L — SIGNIFICANT CHANGE UP (ref 3.5–5)
POTASSIUM SERPL-SCNC: 3.9 MMOL/L — SIGNIFICANT CHANGE UP (ref 3.5–5)
PROT SERPL-MCNC: 7.3 G/DL — SIGNIFICANT CHANGE UP (ref 6–8)
PROTHROM AB SERPL-ACNC: 12.1 SEC — SIGNIFICANT CHANGE UP (ref 9.95–12.87)
RBC # BLD: 4.89 M/UL — SIGNIFICANT CHANGE UP (ref 4.7–6.1)
RBC # FLD: 13.1 % — SIGNIFICANT CHANGE UP (ref 11.5–14.5)
SODIUM SERPL-SCNC: 140 MMOL/L — SIGNIFICANT CHANGE UP (ref 135–146)
WBC # BLD: 5.82 K/UL — SIGNIFICANT CHANGE UP (ref 4.8–10.8)
WBC # FLD AUTO: 5.82 K/UL — SIGNIFICANT CHANGE UP (ref 4.8–10.8)

## 2025-03-28 PROCEDURE — 93010 ELECTROCARDIOGRAM REPORT: CPT

## 2025-03-28 PROCEDURE — 85025 COMPLETE CBC W/AUTO DIFF WBC: CPT

## 2025-03-28 PROCEDURE — 80053 COMPREHEN METABOLIC PANEL: CPT

## 2025-03-28 PROCEDURE — 85610 PROTHROMBIN TIME: CPT

## 2025-03-28 PROCEDURE — 93005 ELECTROCARDIOGRAM TRACING: CPT

## 2025-03-28 PROCEDURE — 85730 THROMBOPLASTIN TIME PARTIAL: CPT

## 2025-03-28 PROCEDURE — 36415 COLL VENOUS BLD VENIPUNCTURE: CPT

## 2025-03-28 PROCEDURE — 99214 OFFICE O/P EST MOD 30 MIN: CPT | Mod: 25

## 2025-03-28 PROCEDURE — 71046 X-RAY EXAM CHEST 2 VIEWS: CPT | Mod: 26

## 2025-03-28 PROCEDURE — 71046 X-RAY EXAM CHEST 2 VIEWS: CPT

## 2025-03-28 NOTE — H&P PST ADULT - HISTORY OF PRESENT ILLNESS
58 Y/O MALE PT TO PAST WITH HX  LIVER CA, C/O               PT NOW FOR SCHEDULED PROCEDURE ( LIVER ABLATION) . PT DENIES ANY CP SOB PALP COUGH DYSURIA FEVER URI. PT ABLE TO RIC 1-2 FOS W/O SOB  Anesthesia Alert  NO--Difficult Airway  NO--History of neck surgery or radiation  NO--Limited ROM of neck  NO--History of Malignant hyperthermia  NO--Personal or family history of Pseudocholinesterase deficiency.  NO--Prior Anesthesia Complication  NO--Latex Allergy  NO--Loose teeth  NO--History of Rheumatoid Arthritis  NO--SILVERIO  NO--Bleeding risk  NO--Other_____   60 Y/O MALE PT TO PAST WITH HX  LIVER LESIONS  PT NOW FOR SCHEDULED PROCEDURE ( LIVER ABLATION) . PT DENIES ANY CP SOB PALP COUGH DYSURIA FEVER URI.   PT REQUIRES    Anesthesia Alert  NO--Difficult Airway  NO--History of neck surgery or radiation  NO--Limited ROM of neck  NO--History of Malignant hyperthermia  NO--Personal or family history of Pseudocholinesterase deficiency.  NO--Prior Anesthesia Complication  NO--Latex Allergy  NO--Loose teeth  NO--History of Rheumatoid Arthritis  NO--SILVERIO  NO--Bleeding risk  NO--Other_____  RCRI CLASS I  DASI 8 METS   60 Y/O MALE PT TO PAST WITH HX  LIVER LESIONS PAST 4 YRS DENIES PAIN   PT NOW FOR SCHEDULED PROCEDURE ( LIVER ABLATION) . PT DENIES ANY CP SOB PALP COUGH DYSURIA FEVER URI.   PT REQUIRES    Anesthesia Alert  CLASS IV  NO--History of neck surgery or radiation  NO--Limited ROM of neck  NO--History of Malignant hyperthermia  NO--Personal or family history of Pseudocholinesterase deficiency.  NO--Prior Anesthesia Complication  NO--Latex Allergy  NO--Loose teeth  NO--History of Rheumatoid Arthritis  NO--SILVERIO  NO--Bleeding risk  NO--Other_____  RCRI CLASS I  DASI 8 METS

## 2025-03-28 NOTE — H&P PST ADULT - NSICDXPASTMEDICALHX_GEN_ALL_CORE_FT
PAST MEDICAL HISTORY:  DM (diabetes mellitus)     Hepatocellular carcinoma     History of hepatitis C     HTN (hypertension)     Liver cancer      PAST MEDICAL HISTORY:  Cirrhosis     DM (diabetes mellitus)     Hepatocellular carcinoma     History of hepatitis C     HTN (hypertension)     Liver cancer

## 2025-03-29 DIAGNOSIS — C22.0 LIVER CELL CARCINOMA: ICD-10-CM

## 2025-03-29 DIAGNOSIS — Z01.818 ENCOUNTER FOR OTHER PREPROCEDURAL EXAMINATION: ICD-10-CM

## 2025-04-08 ENCOUNTER — RESULT REVIEW (OUTPATIENT)
Age: 60
End: 2025-04-08

## 2025-04-08 ENCOUNTER — TRANSCRIPTION ENCOUNTER (OUTPATIENT)
Age: 60
End: 2025-04-08

## 2025-04-08 ENCOUNTER — OUTPATIENT (OUTPATIENT)
Dept: OUTPATIENT SERVICES | Facility: HOSPITAL | Age: 60
LOS: 1 days | Discharge: HOME | End: 2025-04-08
Payer: MEDICAID

## 2025-04-08 VITALS
RESPIRATION RATE: 18 BRPM | SYSTOLIC BLOOD PRESSURE: 133 MMHG | HEART RATE: 77 BPM | TEMPERATURE: 97 F | OXYGEN SATURATION: 95 % | DIASTOLIC BLOOD PRESSURE: 78 MMHG

## 2025-04-08 VITALS
TEMPERATURE: 97 F | HEART RATE: 80 BPM | OXYGEN SATURATION: 96 % | RESPIRATION RATE: 17 BRPM | SYSTOLIC BLOOD PRESSURE: 126 MMHG | DIASTOLIC BLOOD PRESSURE: 79 MMHG

## 2025-04-08 DIAGNOSIS — C22.0 LIVER CELL CARCINOMA: ICD-10-CM

## 2025-04-08 DIAGNOSIS — K74.60 UNSPECIFIED CIRRHOSIS OF LIVER: ICD-10-CM

## 2025-04-08 DIAGNOSIS — D49.0 NEOPLASM OF UNSPECIFIED BEHAVIOR OF DIGESTIVE SYSTEM: Chronic | ICD-10-CM

## 2025-04-08 DIAGNOSIS — R16.0 HEPATOMEGALY, NOT ELSEWHERE CLASSIFIED: ICD-10-CM

## 2025-04-08 LAB — GLUCOSE BLDC GLUCOMTR-MCNC: 267 MG/DL — HIGH (ref 70–99)

## 2025-04-08 PROCEDURE — 47382 PERCUT ABLATE LIVER RF: CPT

## 2025-04-08 PROCEDURE — 77013 CT GUIDE FOR TISSUE ABLATION: CPT

## 2025-04-08 PROCEDURE — C1886: CPT

## 2025-04-08 PROCEDURE — 77013 CT GUIDE FOR TISSUE ABLATION: CPT | Mod: 26

## 2025-04-08 PROCEDURE — C9399: CPT

## 2025-04-08 PROCEDURE — 82962 GLUCOSE BLOOD TEST: CPT

## 2025-04-08 NOTE — ASU DISCHARGE PLAN (ADULT/PEDIATRIC) - NS MD DC FALL RISK RISK
For information on Fall & Injury Prevention, visit: [email or web address] OR  [email or web address] OR  https://www.cdc.gov/steadi/patient.html 1.94

## 2025-04-08 NOTE — PROGRESS NOTE ADULT - SUBJECTIVE AND OBJECTIVE BOX
Interventional Radiology Outpatient Documentation    POSTOPERATIVE PROCEDURAL EVALUATION:     Procedure:  Liver mass ablation    Pre-Op Diagnosis: HCC    Post-Op Diagnosis: same    Attending: Go  Resident: None    Anesthesia (type):  [ ] General Anesthesia  [ x] Sedation  [ ] Spinal Anesthesia  [ x] Local/Regional    Contrast: 200 cc IV    Estimated Blood Loss: Minimal, < 5 cc    Condition:   [ ] Critical  [ ] Serious  [ ] Fair   [x ] Good    Findings/Follow up Plan of Care:  Successful liver mass ablation.      See full report in pacs    Specimens Removed: None    Complications: None    Disposition: Recovery

## 2025-04-08 NOTE — ASU PATIENT PROFILE, ADULT - NSICDXPASTMEDICALHX_GEN_ALL_CORE_FT
PAST MEDICAL HISTORY:  Cirrhosis     DM (diabetes mellitus)     Hepatocellular carcinoma     History of hepatitis C     HTN (hypertension)     Liver cancer

## 2025-04-08 NOTE — PRE PROCEDURE NOTE - PRE PROCEDURE EVALUATION
Vascular & Interventional Radiology Pre-Procedure Note    Procedure Name: image guided microwave liver mass ablation of segment VI/VII LIRADS 5 lesion with sedation/anesthesia    HPI: 58 Y/O MALE PT TO PAST WITH HX LIVER LESIONS PRESENTS TO IR FOR IMAGE GUIDED MICROWAVE LIVER MASS ABLATION OF SEGMENT VI/VII LIRADS 5 LESION WITH SEDATION/ANESTHESIA      Allergies: No Known Allergies      Exam  General: NAD, AAO x3, no distress  Chest: breathing comfortably on room air, CTAB  Abdomen: soft, non-tender, non- distended   Extremities: positive pulses bilaterally x4      Consentable: [x ] Yes   [ ] No     Plan:   -59y Male presents for image guided microwave liver mass ablation of segment VI/VII LIRADS 5 lesion with sedation/anesthesia  -Risks/Benefits/alternatives explained with the patient and/or healthcare proxy and witnessed informed consent obtained.

## 2025-04-08 NOTE — ASU DISCHARGE PLAN (ADULT/PEDIATRIC) - FINANCIAL ASSISTANCE
[facility] provides services at a reduced cost to those who are determined to be eligible through [facility]’s financial assistance program. Information regarding [facility]’s financial assistance program can be found by going to [email or web address] or by calling [phone].

## 2025-04-08 NOTE — ASU PATIENT PROFILE, ADULT - NS TRANSFER PATIENT BELONGINGS
· Reported hypoxia in the ED which now appears to have resolved, patient on RA  · Does not appear to be in acute exacerbation  · Continue Atrovent None

## 2025-04-08 NOTE — PRE PROCEDURE NOTE - GENERAL PROCEDURE NAME
image guided microwave liver mass ablation of segment VI/VII LIRADS 5 lesion with sedation/anesthesia

## 2025-04-10 PROBLEM — K74.60 UNSPECIFIED CIRRHOSIS OF LIVER: Chronic | Status: ACTIVE | Noted: 2025-03-28

## 2025-04-18 ENCOUNTER — OUTPATIENT (OUTPATIENT)
Dept: OUTPATIENT SERVICES | Facility: HOSPITAL | Age: 60
LOS: 1 days | End: 2025-04-18
Payer: MEDICAID

## 2025-04-18 ENCOUNTER — APPOINTMENT (OUTPATIENT)
Dept: GASTROENTEROLOGY | Facility: CLINIC | Age: 60
End: 2025-04-18
Payer: MEDICAID

## 2025-04-18 VITALS
HEIGHT: 69 IN | DIASTOLIC BLOOD PRESSURE: 58 MMHG | SYSTOLIC BLOOD PRESSURE: 102 MMHG | BODY MASS INDEX: 28.44 KG/M2 | HEART RATE: 75 BPM | OXYGEN SATURATION: 98 % | WEIGHT: 192 LBS

## 2025-04-18 DIAGNOSIS — Z00.00 ENCOUNTER FOR GENERAL ADULT MEDICAL EXAMINATION WITHOUT ABNORMAL FINDINGS: ICD-10-CM

## 2025-04-18 PROCEDURE — 99214 OFFICE O/P EST MOD 30 MIN: CPT

## 2025-04-18 RX ORDER — POLYETHYLENE GLYCOL 3350 AND ELECTROLYTES WITH LEMON FLAVOR 236; 22.74; 6.74; 5.86; 2.97 G/4L; G/4L; G/4L; G/4L; G/4L
236 POWDER, FOR SOLUTION ORAL
Qty: 1 | Refills: 0 | Status: ACTIVE | COMMUNITY
Start: 2025-04-18 | End: 1900-01-01

## 2025-04-18 RX ORDER — POLYETHYLENE GLYCOL 3350, SODIUM SULFATE, POTASSIUM CHLORIDE, MAGNESIUM SULFATE, AND SODIUM CHLORIDE FOR ORAL SOLUTION 178.7-7.3G
178.7 KIT ORAL
Qty: 1 | Refills: 0 | Status: ACTIVE | COMMUNITY
Start: 2025-04-18 | End: 1900-01-01

## 2025-04-21 ENCOUNTER — APPOINTMENT (OUTPATIENT)
Dept: INTERNAL MEDICINE | Facility: CLINIC | Age: 60
End: 2025-04-21
Payer: MEDICAID

## 2025-04-21 ENCOUNTER — OUTPATIENT (OUTPATIENT)
Dept: OUTPATIENT SERVICES | Facility: HOSPITAL | Age: 60
LOS: 1 days | End: 2025-04-21
Payer: MEDICAID

## 2025-04-21 ENCOUNTER — NON-APPOINTMENT (OUTPATIENT)
Age: 60
End: 2025-04-21

## 2025-04-21 ENCOUNTER — APPOINTMENT (OUTPATIENT)
Dept: INTERVENTIONAL RADIOLOGY/VASCULAR | Facility: CLINIC | Age: 60
End: 2025-04-21
Payer: MEDICAID

## 2025-04-21 VITALS
TEMPERATURE: 97.4 F | SYSTOLIC BLOOD PRESSURE: 130 MMHG | HEART RATE: 87 BPM | OXYGEN SATURATION: 97 % | DIASTOLIC BLOOD PRESSURE: 81 MMHG

## 2025-04-21 VITALS
SYSTOLIC BLOOD PRESSURE: 115 MMHG | DIASTOLIC BLOOD PRESSURE: 79 MMHG | WEIGHT: 195 LBS | HEIGHT: 69 IN | BODY MASS INDEX: 28.88 KG/M2 | TEMPERATURE: 97.5 F | HEART RATE: 91 BPM | OXYGEN SATURATION: 98 %

## 2025-04-21 DIAGNOSIS — D49.0 NEOPLASM OF UNSPECIFIED BEHAVIOR OF DIGESTIVE SYSTEM: Chronic | ICD-10-CM

## 2025-04-21 DIAGNOSIS — C22.0 LIVER CELL CARCINOMA: ICD-10-CM

## 2025-04-21 DIAGNOSIS — K74.60 UNSPECIFIED CIRRHOSIS OF LIVER: ICD-10-CM

## 2025-04-21 DIAGNOSIS — E78.2 MIXED HYPERLIPIDEMIA: ICD-10-CM

## 2025-04-21 DIAGNOSIS — Z12.11 ENCOUNTER FOR SCREENING FOR MALIGNANT NEOPLASM OF COLON: ICD-10-CM

## 2025-04-21 DIAGNOSIS — E11.9 TYPE 2 DIABETES MELLITUS W/OUT COMPLICATIONS: ICD-10-CM

## 2025-04-21 DIAGNOSIS — Z00.00 ENCOUNTER FOR GENERAL ADULT MEDICAL EXAMINATION WITHOUT ABNORMAL FINDINGS: ICD-10-CM

## 2025-04-21 DIAGNOSIS — I85.10 UNSPECIFIED CIRRHOSIS OF LIVER: ICD-10-CM

## 2025-04-21 DIAGNOSIS — I10 ESSENTIAL (PRIMARY) HYPERTENSION: ICD-10-CM

## 2025-04-21 DIAGNOSIS — B18.2 CHRONIC VIRAL HEPATITIS C: ICD-10-CM

## 2025-04-21 DIAGNOSIS — F10.90 ALCOHOL USE, UNSPECIFIED, UNCOMPLICATED: ICD-10-CM

## 2025-04-21 DIAGNOSIS — E66.9 OBESITY, UNSPECIFIED: ICD-10-CM

## 2025-04-21 DIAGNOSIS — Z85.05 PERSONAL HISTORY OF MALIGNANT NEOPLASM OF LIVER: ICD-10-CM

## 2025-04-21 DIAGNOSIS — E11.42 TYPE 2 DIABETES MELLITUS WITH DIABETIC POLYNEUROPATHY: ICD-10-CM

## 2025-04-21 PROCEDURE — G2211 COMPLEX E/M VISIT ADD ON: CPT | Mod: NC

## 2025-04-21 PROCEDURE — 80053 COMPREHEN METABOLIC PANEL: CPT

## 2025-04-21 PROCEDURE — T1013: CPT

## 2025-04-21 PROCEDURE — 99214 OFFICE O/P EST MOD 30 MIN: CPT

## 2025-04-21 PROCEDURE — 84443 ASSAY THYROID STIM HORMONE: CPT

## 2025-04-21 PROCEDURE — 85025 COMPLETE CBC W/AUTO DIFF WBC: CPT

## 2025-04-21 PROCEDURE — 83036 HEMOGLOBIN GLYCOSYLATED A1C: CPT

## 2025-04-21 PROCEDURE — 82306 VITAMIN D 25 HYDROXY: CPT

## 2025-04-21 PROCEDURE — 80061 LIPID PANEL: CPT

## 2025-04-24 DIAGNOSIS — K74.60 UNSPECIFIED CIRRHOSIS OF LIVER: ICD-10-CM

## 2025-04-24 DIAGNOSIS — Z12.11 ENCOUNTER FOR SCREENING FOR MALIGNANT NEOPLASM OF COLON: ICD-10-CM

## 2025-04-25 LAB
25(OH)D3 SERPL-MCNC: 25 NG/ML
ALBUMIN SERPL ELPH-MCNC: 4 G/DL
ALP BLD-CCNC: 264 U/L
ALT SERPL-CCNC: 53 U/L
ANION GAP SERPL CALC-SCNC: 11 MMOL/L
AST SERPL-CCNC: 53 U/L
BASOPHILS # BLD AUTO: 0.05 K/UL
BASOPHILS NFR BLD AUTO: 1 %
BILIRUB SERPL-MCNC: 2.2 MG/DL
BUN SERPL-MCNC: 10 MG/DL
CALCIUM SERPL-MCNC: 10.4 MG/DL
CHLORIDE SERPL-SCNC: 102 MMOL/L
CHOLEST SERPL-MCNC: 146 MG/DL
CO2 SERPL-SCNC: 25 MMOL/L
CREAT SERPL-MCNC: 0.7 MG/DL
EGFRCR SERPLBLD CKD-EPI 2021: 107 ML/MIN/1.73M2
EOSINOPHIL # BLD AUTO: 0 K/UL
EOSINOPHIL NFR BLD AUTO: 0 %
ESTIMATED AVERAGE GLUCOSE: 171 MG/DL
GLUCOSE SERPL-MCNC: 214 MG/DL
HBA1C MFR BLD HPLC: 7.6 %
HCT VFR BLD CALC: 45.5 %
HDLC SERPL-MCNC: 63 MG/DL
HGB BLD-MCNC: 16.2 G/DL
IMM GRANULOCYTES NFR BLD AUTO: 0.4 %
LDLC SERPL-MCNC: 66 MG/DL
LYMPHOCYTES # BLD AUTO: 0.54 K/UL
LYMPHOCYTES NFR BLD AUTO: 10.8 %
MAN DIFF?: NORMAL
MCHC RBC-ENTMCNC: 33.8 PG
MCHC RBC-ENTMCNC: 35.6 G/DL
MCV RBC AUTO: 95 FL
MONOCYTES # BLD AUTO: 0.55 K/UL
MONOCYTES NFR BLD AUTO: 11 %
NEUTROPHILS # BLD AUTO: 3.85 K/UL
NEUTROPHILS NFR BLD AUTO: 76.8 %
NONHDLC SERPL-MCNC: 83 MG/DL
PLATELET # BLD AUTO: 127 K/UL
PMV BLD AUTO: 0 /100 WBCS
PMV BLD: 11 FL
POTASSIUM SERPL-SCNC: 4.6 MMOL/L
PROT SERPL-MCNC: 7.5 G/DL
RBC # BLD: 4.79 M/UL
RBC # FLD: 13.2 %
SODIUM SERPL-SCNC: 138 MMOL/L
TRIGL SERPL-MCNC: 91 MG/DL
TSH SERPL-ACNC: 3.08 UIU/ML
WBC # FLD AUTO: 5.01 K/UL

## 2025-04-29 DIAGNOSIS — Z12.11 ENCOUNTER FOR SCREENING FOR MALIGNANT NEOPLASM OF COLON: ICD-10-CM

## 2025-04-29 DIAGNOSIS — E11.42 TYPE 2 DIABETES MELLITUS WITH DIABETIC POLYNEUROPATHY: ICD-10-CM

## 2025-04-29 DIAGNOSIS — I10 ESSENTIAL (PRIMARY) HYPERTENSION: ICD-10-CM

## 2025-04-29 DIAGNOSIS — Z85.05 PERSONAL HISTORY OF MALIGNANT NEOPLASM OF LIVER: ICD-10-CM

## 2025-04-29 DIAGNOSIS — E66.9 OBESITY, UNSPECIFIED: ICD-10-CM

## 2025-04-29 DIAGNOSIS — B18.2 CHRONIC VIRAL HEPATITIS C: ICD-10-CM

## 2025-04-29 DIAGNOSIS — E78.2 MIXED HYPERLIPIDEMIA: ICD-10-CM

## 2025-04-29 DIAGNOSIS — C22.0 LIVER CELL CARCINOMA: ICD-10-CM

## 2025-04-29 DIAGNOSIS — E11.9 TYPE 2 DIABETES MELLITUS WITHOUT COMPLICATIONS: ICD-10-CM

## 2025-04-29 DIAGNOSIS — K74.60 UNSPECIFIED CIRRHOSIS OF LIVER: ICD-10-CM

## 2025-05-12 ENCOUNTER — APPOINTMENT (OUTPATIENT)
Dept: HEPATOLOGY | Facility: CLINIC | Age: 60
End: 2025-05-12
Payer: MEDICAID

## 2025-05-12 ENCOUNTER — OUTPATIENT (OUTPATIENT)
Dept: OUTPATIENT SERVICES | Facility: HOSPITAL | Age: 60
LOS: 1 days | End: 2025-05-12
Payer: MEDICAID

## 2025-05-12 ENCOUNTER — LABORATORY RESULT (OUTPATIENT)
Age: 60
End: 2025-05-12

## 2025-05-12 VITALS — HEART RATE: 80 BPM | OXYGEN SATURATION: 99 % | HEIGHT: 69 IN | WEIGHT: 188 LBS | BODY MASS INDEX: 27.85 KG/M2

## 2025-05-12 DIAGNOSIS — R76.8 OTHER SPECIFIED ABNORMAL IMMUNOLOGICAL FINDINGS IN SERUM: ICD-10-CM

## 2025-05-12 DIAGNOSIS — D49.0 NEOPLASM OF UNSPECIFIED BEHAVIOR OF DIGESTIVE SYSTEM: Chronic | ICD-10-CM

## 2025-05-12 DIAGNOSIS — I85.10 UNSPECIFIED CIRRHOSIS OF LIVER: ICD-10-CM

## 2025-05-12 DIAGNOSIS — Z00.00 ENCOUNTER FOR GENERAL ADULT MEDICAL EXAMINATION W/OUT ABNORMAL FINDINGS: ICD-10-CM

## 2025-05-12 DIAGNOSIS — Z00.00 ENCOUNTER FOR GENERAL ADULT MEDICAL EXAMINATION WITHOUT ABNORMAL FINDINGS: ICD-10-CM

## 2025-05-12 DIAGNOSIS — K74.60 UNSPECIFIED CIRRHOSIS OF LIVER: ICD-10-CM

## 2025-05-12 DIAGNOSIS — C22.0 LIVER CELL CARCINOMA: ICD-10-CM

## 2025-05-12 DIAGNOSIS — Z85.05 PERSONAL HISTORY OF MALIGNANT NEOPLASM OF LIVER: ICD-10-CM

## 2025-05-12 DIAGNOSIS — B19.20 UNSPECIFIED VIRAL HEPATITIS C W/OUT HEPATIC COMA: ICD-10-CM

## 2025-05-12 PROCEDURE — G2211 COMPLEX E/M VISIT ADD ON: CPT | Mod: NC

## 2025-05-12 PROCEDURE — 99215 OFFICE O/P EST HI 40 MIN: CPT

## 2025-05-13 LAB
AFP-TM SERPL-MCNC: <1.8 NG/ML
ALBUMIN SERPL ELPH-MCNC: 4 G/DL
ALP BLD-CCNC: 241 U/L
ALT SERPL-CCNC: 42 U/L
ANION GAP SERPL CALC-SCNC: 16 MMOL/L
AST SERPL-CCNC: 43 U/L
BASOPHILS # BLD AUTO: 0.05 K/UL
BASOPHILS NFR BLD AUTO: 0.7 %
BILIRUB SERPL-MCNC: 1.7 MG/DL
BUN SERPL-MCNC: 15 MG/DL
CALCIUM SERPL-MCNC: 10 MG/DL
CHLORIDE SERPL-SCNC: 101 MMOL/L
CO2 SERPL-SCNC: 21 MMOL/L
CREAT SERPL-MCNC: 0.6 MG/DL
EGFRCR SERPLBLD CKD-EPI 2021: 112 ML/MIN/1.73M2
EOSINOPHIL # BLD AUTO: 0 K/UL
EOSINOPHIL NFR BLD AUTO: 0 %
GLUCOSE SERPL-MCNC: 148 MG/DL
HBV DNA # SERPL NAA+PROBE: 16 IU/ML
HBV SURFACE AB SERPL IA-ACNC: 15.1 MIU/ML
HCT VFR BLD CALC: 44.7 %
HEPB DNA PCR INT: DETECTED IU/ML
HEPB DNA PCR LOG: 1.19 LOGIU/ML
HGB BLD-MCNC: 15.6 G/DL
IMM GRANULOCYTES NFR BLD AUTO: 0.3 %
INR PPP: 1.02 RATIO
LYMPHOCYTES # BLD AUTO: 1.31 K/UL
LYMPHOCYTES NFR BLD AUTO: 19.4 %
MAN DIFF?: NORMAL
MCHC RBC-ENTMCNC: 34.1 PG
MCHC RBC-ENTMCNC: 34.9 G/DL
MCV RBC AUTO: 97.6 FL
MONOCYTES # BLD AUTO: 0.73 K/UL
MONOCYTES NFR BLD AUTO: 10.8 %
NEUTROPHILS # BLD AUTO: 4.66 K/UL
NEUTROPHILS NFR BLD AUTO: 68.8 %
PLATELET # BLD AUTO: 121 K/UL
PMV BLD AUTO: 0 /100 WBCS
PMV BLD: 10.6 FL
POTASSIUM SERPL-SCNC: 4.4 MMOL/L
PROT SERPL-MCNC: 7.9 G/DL
PT BLD: 12.1 SEC
RBC # BLD: 4.58 M/UL
RBC # FLD: 13.9 %
SODIUM SERPL-SCNC: 138 MMOL/L
WBC # FLD AUTO: 6.77 K/UL

## 2025-05-16 DIAGNOSIS — K74.60 UNSPECIFIED CIRRHOSIS OF LIVER: ICD-10-CM

## 2025-05-16 DIAGNOSIS — Z00.00 ENCOUNTER FOR GENERAL ADULT MEDICAL EXAMINATION WITHOUT ABNORMAL FINDINGS: ICD-10-CM

## 2025-05-16 DIAGNOSIS — R76.8 OTHER SPECIFIED ABNORMAL IMMUNOLOGICAL FINDINGS IN SERUM: ICD-10-CM

## 2025-05-16 DIAGNOSIS — C22.0 LIVER CELL CARCINOMA: ICD-10-CM

## 2025-05-16 LAB
PETH 16:0/18:1: 95 NG/ML
PETH 16:0/18:2: 117 NG/ML
PETH COMMENTS: NORMAL

## 2025-07-28 ENCOUNTER — OUTPATIENT (OUTPATIENT)
Dept: OUTPATIENT SERVICES | Facility: HOSPITAL | Age: 60
LOS: 1 days | End: 2025-07-28
Payer: MEDICAID

## 2025-07-28 ENCOUNTER — APPOINTMENT (OUTPATIENT)
Dept: INTERNAL MEDICINE | Facility: CLINIC | Age: 60
End: 2025-07-28

## 2025-07-28 VITALS
DIASTOLIC BLOOD PRESSURE: 65 MMHG | HEART RATE: 85 BPM | WEIGHT: 190.5 LBS | HEIGHT: 69 IN | TEMPERATURE: 97.9 F | BODY MASS INDEX: 28.22 KG/M2 | SYSTOLIC BLOOD PRESSURE: 103 MMHG | OXYGEN SATURATION: 99 %

## 2025-07-28 DIAGNOSIS — E11.9 TYPE 2 DIABETES MELLITUS W/OUT COMPLICATIONS: ICD-10-CM

## 2025-07-28 DIAGNOSIS — Z00.00 ENCOUNTER FOR GENERAL ADULT MEDICAL EXAMINATION WITHOUT ABNORMAL FINDINGS: ICD-10-CM

## 2025-07-28 DIAGNOSIS — I85.10 UNSPECIFIED CIRRHOSIS OF LIVER: ICD-10-CM

## 2025-07-28 DIAGNOSIS — E66.9 OBESITY, UNSPECIFIED: ICD-10-CM

## 2025-07-28 DIAGNOSIS — K74.60 UNSPECIFIED CIRRHOSIS OF LIVER: ICD-10-CM

## 2025-07-28 DIAGNOSIS — B18.2 CHRONIC VIRAL HEPATITIS C: ICD-10-CM

## 2025-07-28 DIAGNOSIS — Z00.00 ENCOUNTER FOR GENERAL ADULT MEDICAL EXAMINATION W/OUT ABNORMAL FINDINGS: ICD-10-CM

## 2025-07-28 DIAGNOSIS — E11.65 TYPE 2 DIABETES MELLITUS WITH HYPERGLYCEMIA: ICD-10-CM

## 2025-07-28 DIAGNOSIS — C22.0 LIVER CELL CARCINOMA: ICD-10-CM

## 2025-07-28 PROCEDURE — 80053 COMPREHEN METABOLIC PANEL: CPT

## 2025-07-28 PROCEDURE — 85025 COMPLETE CBC W/AUTO DIFF WBC: CPT

## 2025-07-28 PROCEDURE — T1013: CPT

## 2025-07-28 PROCEDURE — 81003 URINALYSIS AUTO W/O SCOPE: CPT

## 2025-07-28 PROCEDURE — 85610 PROTHROMBIN TIME: CPT

## 2025-07-28 PROCEDURE — 99214 OFFICE O/P EST MOD 30 MIN: CPT

## 2025-07-28 PROCEDURE — 83036 HEMOGLOBIN GLYCOSYLATED A1C: CPT

## 2025-07-28 PROCEDURE — G2211 COMPLEX E/M VISIT ADD ON: CPT | Mod: NC

## 2025-07-28 PROCEDURE — 84443 ASSAY THYROID STIM HORMONE: CPT

## 2025-07-28 PROCEDURE — 85730 THROMBOPLASTIN TIME PARTIAL: CPT

## 2025-07-29 LAB
ALBUMIN SERPL ELPH-MCNC: 4.2 G/DL
ALP BLD-CCNC: 280 U/L
ALT SERPL-CCNC: 59 U/L
ANION GAP SERPL CALC-SCNC: 13 MMOL/L
APTT BLD: 37.8 SEC
AST SERPL-CCNC: 63 U/L
BASOPHILS # BLD AUTO: 0.05 K/UL
BASOPHILS NFR BLD AUTO: 0.8 %
BILIRUB SERPL-MCNC: 1.8 MG/DL
BUN SERPL-MCNC: 13 MG/DL
CALCIUM SERPL-MCNC: 9.8 MG/DL
CHLORIDE SERPL-SCNC: 102 MMOL/L
CO2 SERPL-SCNC: 23 MMOL/L
CREAT SERPL-MCNC: 0.6 MG/DL
EGFRCR SERPLBLD CKD-EPI 2021: 111 ML/MIN/1.73M2
EOSINOPHIL # BLD AUTO: 0.02 K/UL
EOSINOPHIL NFR BLD AUTO: 0.3 %
ESTIMATED AVERAGE GLUCOSE: 177 MG/DL
GLUCOSE SERPL-MCNC: 214 MG/DL
HBA1C MFR BLD HPLC: 7.8 %
HCT VFR BLD CALC: 45.6 %
HGB BLD-MCNC: 15.9 G/DL
IMM GRANULOCYTES NFR BLD AUTO: 0.3 %
INR PPP: 0.99 RATIO
LYMPHOCYTES # BLD AUTO: 1.42 K/UL
LYMPHOCYTES NFR BLD AUTO: 22.1 %
MAN DIFF?: NORMAL
MCHC RBC-ENTMCNC: 32.1 PG
MCHC RBC-ENTMCNC: 34.9 G/DL
MCV RBC AUTO: 91.9 FL
MONOCYTES # BLD AUTO: 0.54 K/UL
MONOCYTES NFR BLD AUTO: 8.4 %
NEUTROPHILS # BLD AUTO: 4.37 K/UL
NEUTROPHILS NFR BLD AUTO: 68.1 %
PLATELET # BLD AUTO: 122 K/UL
PMV BLD AUTO: 0 /100 WBCS
PMV BLD: 10.3 FL
POTASSIUM SERPL-SCNC: 4.7 MMOL/L
PROT SERPL-MCNC: 7.3 G/DL
PT BLD: 11.7 SEC
RBC # BLD: 4.96 M/UL
RBC # FLD: 13.9 %
SODIUM SERPL-SCNC: 138 MMOL/L
TSH SERPL-ACNC: 3.05 UIU/ML
WBC # FLD AUTO: 6.42 K/UL

## 2025-08-05 ENCOUNTER — OUTPATIENT (OUTPATIENT)
Dept: OUTPATIENT SERVICES | Facility: HOSPITAL | Age: 60
LOS: 1 days | End: 2025-08-05
Payer: MEDICAID

## 2025-08-05 ENCOUNTER — APPOINTMENT (OUTPATIENT)
Dept: PODIATRY | Facility: CLINIC | Age: 60
End: 2025-08-05
Payer: MEDICAID

## 2025-08-05 DIAGNOSIS — Z00.00 ENCOUNTER FOR GENERAL ADULT MEDICAL EXAMINATION WITHOUT ABNORMAL FINDINGS: ICD-10-CM

## 2025-08-05 DIAGNOSIS — E11.40 TYPE 2 DIABETES MELLITUS WITH DIABETIC NEUROPATHY, UNSPECIFIED: ICD-10-CM

## 2025-08-05 DIAGNOSIS — R25.2 CRAMP AND SPASM: ICD-10-CM

## 2025-08-05 DIAGNOSIS — D49.0 NEOPLASM OF UNSPECIFIED BEHAVIOR OF DIGESTIVE SYSTEM: Chronic | ICD-10-CM

## 2025-08-05 DIAGNOSIS — L60.2 ONYCHOGRYPHOSIS: ICD-10-CM

## 2025-08-05 PROCEDURE — 11720 DEBRIDE NAIL 1-5: CPT | Mod: 50

## 2025-08-05 PROCEDURE — 11720 DEBRIDE NAIL 1-5: CPT

## 2025-08-05 PROCEDURE — 99213 OFFICE O/P EST LOW 20 MIN: CPT | Mod: 25

## 2025-08-05 RX ORDER — CAPSAICIN 0.1 G/100G
0.1 CREAM TOPICAL 4 TIMES DAILY
Qty: 1 | Refills: 5 | Status: ACTIVE | COMMUNITY
Start: 2025-08-05 | End: 1900-01-01

## 2025-08-05 RX ORDER — GABAPENTIN 300 MG/1
300 CAPSULE ORAL DAILY
Qty: 42 | Refills: 0 | Status: ACTIVE | COMMUNITY
Start: 2025-08-05 | End: 1900-01-01

## 2025-08-13 DIAGNOSIS — R25.2 CRAMP AND SPASM: ICD-10-CM

## 2025-08-13 DIAGNOSIS — L60.2 ONYCHOGRYPHOSIS: ICD-10-CM
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
## 2025-08-13 DIAGNOSIS — E11.40 TYPE 2 DIABETES MELLITUS WITH DIABETIC NEUROPATHY, UNSPECIFIED: ICD-10-CM

## 2025-09-02 ENCOUNTER — RX RENEWAL (OUTPATIENT)
Age: 60
End: 2025-09-02